# Patient Record
Sex: FEMALE | Race: WHITE | Employment: FULL TIME | URBAN - METROPOLITAN AREA
[De-identification: names, ages, dates, MRNs, and addresses within clinical notes are randomized per-mention and may not be internally consistent; named-entity substitution may affect disease eponyms.]

---

## 2017-01-03 ENCOUNTER — OFFICE VISIT (OUTPATIENT)
Dept: INTERNAL MEDICINE CLINIC | Age: 22
End: 2017-01-03

## 2017-01-03 VITALS
DIASTOLIC BLOOD PRESSURE: 68 MMHG | OXYGEN SATURATION: 97 % | SYSTOLIC BLOOD PRESSURE: 118 MMHG | WEIGHT: 150 LBS | HEART RATE: 93 BPM | HEIGHT: 64 IN | TEMPERATURE: 96.8 F | RESPIRATION RATE: 17 BRPM | BODY MASS INDEX: 25.61 KG/M2

## 2017-01-03 DIAGNOSIS — B07.0 PLANTAR WART OF BOTH FEET: Primary | ICD-10-CM

## 2017-01-03 RX ORDER — PREDNISONE 20 MG/1
20 TABLET ORAL 2 TIMES DAILY
COMMUNITY
End: 2017-07-25

## 2017-01-03 NOTE — MR AVS SNAPSHOT
Visit Information Date & Time Provider Department Dept. Phone Encounter #  
 1/3/2017  9:45 AM Eliu Gutierrez MD [facility] Internal Medicine [phone] [id number] Follow-up Instructions Return in about 2 weeks (around 1/17/2017) for wart. Upcoming Health Maintenance Date Due Hepatitis A Peds Age 1-18 (1 of 2 - Standard Series) 12/11/1998 DTaP/Tdap/Td series (1 - Tdap) 12/11/2004 HPV AGE 9Y-26Y (1 of 3 - Female 3 Dose Series) 12/11/2008 INFLUENZA AGE 9 TO ADULT 8/1/2016 Allergies as of 1/3/2017  Review Complete On: 1/3/2017 By: Radha Kirkland LPN No Known Allergies Current Immunizations  Never Reviewed Name Date  
 TB Skin Test (PPD) Intradermal 12/21/2016 Not reviewed this visit Vitals BP Pulse Temp Resp Height(growth percentile) Weight(growth percentile)  
 118/68 (78 %/ 63 %)* (BP 1 Location: Left arm, BP Patient Position: Sitting) 93 96.8 °F (36 °C) 17 5' 4\" (1.626 m) (46 %, Z= -0.11) 150 lb (68 kg) (82 %, Z= 0.91) LMP SpO2 BMI OB Status Smoking Status 12/20/2016 97% 25.75 kg/m2 (84 %, Z= 0.98) Unknown Never Smoker *BP percentiles are based on NHBPEP's 4th Report Growth percentiles are based on CDC 2-20 Years data. Vitals History BMI and BSA Data Body Mass Index Body Surface Area 25.75 kg/m 2 1.75 m 2 Preferred Pharmacy Pharmacy Name Phone 5 [address] [phone] Your Updated Medication List  
  
   
This list is accurate as of: 1/3/17 10:25 AM.  Always use your most recent med list.  
  
  
  
  
 APRISO 0.375 gram capsule Generic drug:  mesalamine ER Take 1.5 g by mouth daily. azaTHIOprine 50 mg tablet Commonly known as:  The Pepsi Take 50 mg by mouth daily. ferrous sulfate 325 mg (65 mg iron) tablet Take  by mouth Daily (before breakfast). omeprazole 40 mg capsule Commonly known as:  PRILOSEC  
 Take 40 mg by mouth daily. predniSONE 20 mg tablet Commonly known as:  Yaritza Dean Take 20 mg by mouth two (2) times a day. Follow-up Instructions Return in about 2 weeks (around 1/17/2017) for wart. Patient Instructions Plantar Warts: Care Instructions Your Care Instructions A plantar wart is a harmless skin growth. Plantar warts occur on the bottom of your feet and may be painful when you walk. A virus makes the top layer of skin grow quickly, causing a wart. Warts usually go away on their own in months or years. Warts are spread easily. You can infect yourself again by touching the wart and then touching another part of your body. You also can infect others by sharing towels, razors, or other personal items. Most plantar warts do not need treatment. But if warts cause you pain or spread, your doctor may recommend that you use an over-the-counter treatment. These include salicylic acid or duct tape. Your doctor may prescribe a stronger medicine to put on warts or may inject them with medicine. Your doctor also can remove warts through surgery or by freezing them. Follow-up care is a key part of your treatment and safety. Be sure to make and go to all appointments, and call your doctor if you are having problems. Its also a good idea to know your test results and keep a list of the medicines you take. How can you care for yourself at home? · Use salicylic acid or duct tape as your doctor directs. You put the medicine or the tape on a wart for a while and then file down the dead skin on the wart. You use the salicylic acid treatment for 2 to 3 months or the tape for 1 to 2 months. · If your doctor prescribes medicine to put on warts, use it exactly as prescribed. Call your doctor if you think you are having a problem with your medicine. · Wear comfortable shoes and socks. Avoid high heels or shoes that put a lot of pressure on your foot. · Pad the wart with doughnut-shaped felt or a moleskin patch. You can buy these at a drugstore. Put the pad around the plantar wart so that it relieves pressure on the wart. You also can place pads or cushions in your shoes to make walking more comfortable. · Take an over-the-counter medicine, such as acetaminophen (Tylenol), ibuprofen (Advil, Motrin), or naproxen (Aleve) if you have pain. Read and follow all instructions on the label. · Do not take two or more pain medicines at the same time unless the doctor told you to. Many pain medicines have acetaminophen, which is Tylenol. Too much acetaminophen (Tylenol) can be harmful. When should you call for help? Call your doctor now or seek immediate medical care if: 
· You have signs of infection, such as: 
¨ Increased pain, swelling, warmth, or redness. ¨ Red streaks leading from a wart. ¨ Pus draining from a wart. ¨ A fever. · You have diabetes or peripheral arterial disease and the skin over a plantar wart is red, broken, or swollen. These diseases can reduce blood flow and feeling in your feet. This could make it easier for you to get an infection. Watch closely for changes in your health, and be sure to contact your doctor if: 
· You cannot walk without pain. · You have a new growth and you are not sure that it is a wart. · You still have warts after 2 to 3 months of over-the-counter treatment. · Your warts are growing or spreading quickly even with treatment. Where can you learn more? Go to http://artie-livan.info/. Enter S429 in the search box to learn more about \"Plantar Warts: Care Instructions. \" Current as of: February 5, 2016 Content Version: 11.1 © 6417-3058 [employer]. Care instructions adapted under license by [employer] (which disclaims liability or warranty for this information).  If you have questions about a medical condition or this instruction, always ask your healthcare professional. [address]  any warranty or liability for your use of this information. Introducing [facility] & HEALTH SERVICES! [facility] introduces [employer] patient portal. Now you can access parts of your medical record, email your doctor's office, and request medication refills online. 1. In your internet browser, go to [email or web address] 2. Click on the First Time User? Click Here link in the Sign In box. You will see the New Member Sign Up page. 3. Enter your [employer] Access Code exactly as it appears below. You will not need to use this code after youve completed the sign-up process. If you do not sign up before the expiration date, you must request a new code. · [employer] Access Code: [email or web address] Expires: 3/19/2017  1:35 PM 
 
4. Enter the last four digits of your Social Security Number (xxxx) and Date of Birth (mm/dd/yyyy) as indicated and click Submit. You will be taken to the next sign-up page. 5. Create a [employer] ID. This will be your [employer] login ID and cannot be changed, so think of one that is secure and easy to remember. 6. Create a [employer] password. You can change your password at any time. 7. Enter your Password Reset Question and Answer. This can be used at a later time if you forget your password. 8. Enter your e-mail address. You will receive e-mail notification when new information is available in [address] 9. Click Sign Up. You can now view and download portions of your medical record. 10. Click the Download Summary menu link to download a portable copy of your medical information. If you have questions, please visit the Frequently Asked Questions section of the [employer] website. Remember, [employer] is NOT to be used for urgent needs. For medical emergencies, dial 911. Now available from your iPhone and Android! Please provide this summary of care documentation to your next provider. Your primary care clinician is listed as Eliu Acharya. If you have any questions after today's visit, please call [phone].

## 2017-01-03 NOTE — PATIENT INSTRUCTIONS
Plantar Warts: Care Instructions  Your Care Instructions  A plantar wart is a harmless skin growth. Plantar warts occur on the bottom of your feet and may be painful when you walk. A virus makes the top layer of skin grow quickly, causing a wart. Warts usually go away on their own in months or years. Warts are spread easily. You can infect yourself again by touching the wart and then touching another part of your body. You also can infect others by sharing towels, razors, or other personal items. Most plantar warts do not need treatment. But if warts cause you pain or spread, your doctor may recommend that you use an over-the-counter treatment. These include salicylic acid or duct tape. Your doctor may prescribe a stronger medicine to put on warts or may inject them with medicine. Your doctor also can remove warts through surgery or by freezing them. Follow-up care is a key part of your treatment and safety. Be sure to make and go to all appointments, and call your doctor if you are having problems. Its also a good idea to know your test results and keep a list of the medicines you take. How can you care for yourself at home? · Use salicylic acid or duct tape as your doctor directs. You put the medicine or the tape on a wart for a while and then file down the dead skin on the wart. You use the salicylic acid treatment for 2 to 3 months or the tape for 1 to 2 months. · If your doctor prescribes medicine to put on warts, use it exactly as prescribed. Call your doctor if you think you are having a problem with your medicine. · Wear comfortable shoes and socks. Avoid high heels or shoes that put a lot of pressure on your foot. · Pad the wart with doughnut-shaped felt or a moleskin patch. You can buy these at a drugstore. Put the pad around the plantar wart so that it relieves pressure on the wart. You also can place pads or cushions in your shoes to make walking more comfortable.   · Take an over-the-counter medicine, such as acetaminophen (Tylenol), ibuprofen (Advil, Motrin), or naproxen (Aleve) if you have pain. Read and follow all instructions on the label. · Do not take two or more pain medicines at the same time unless the doctor told you to. Many pain medicines have acetaminophen, which is Tylenol. Too much acetaminophen (Tylenol) can be harmful. When should you call for help? Call your doctor now or seek immediate medical care if:  · You have signs of infection, such as:  ¨ Increased pain, swelling, warmth, or redness. ¨ Red streaks leading from a wart. ¨ Pus draining from a wart. ¨ A fever. · You have diabetes or peripheral arterial disease and the skin over a plantar wart is red, broken, or swollen. These diseases can reduce blood flow and feeling in your feet. This could make it easier for you to get an infection. Watch closely for changes in your health, and be sure to contact your doctor if:  · You cannot walk without pain. · You have a new growth and you are not sure that it is a wart. · You still have warts after 2 to 3 months of over-the-counter treatment. · Your warts are growing or spreading quickly even with treatment. Where can you learn more? Go to http://artie-livan.info/. Enter S429 in the search box to learn more about \"Plantar Warts: Care Instructions. \"  Current as of: February 5, 2016  Content Version: 11.1  © 6755-8902 [email or web address]. Care instructions adapted under license by [employer] (which disclaims liability or warranty for this information). If you have questions about a medical condition or this instruction, always ask your healthcare professional. [place] [address] any warranty or liability for your use of this information.

## 2017-01-04 NOTE — PROGRESS NOTES
Subjective:     Chief Complaint   Patient presents with    Warts        She  is a 25y.o. year old female who presents today for follow up on planer wart for her both feet. She did get cryotherapy two weeks ago. Reports that the left toe wart is lot better after the treatment. Pertinent items are noted in HPI. Objective:     Vitals:    01/03/17 1002   BP: 118/68   Pulse: 93   Resp: 17   Temp: 96.8 °F (36 °C)   SpO2: 97%   Weight: 150 lb (68 kg)   Height: 5' 4\" (1.626 m)       Physical Examination: General appearance - alert, well appearing, and in no distress, oriented to person, place, and time and normal appearing weight  Mental status - alert, oriented to person, place, and time, normal mood, behavior, speech, dress, motor activity, and thought processes  Extremities - peripheral pulses normal, no pedal edema, no clubbing or cyanosis, there is a small flat wart in her sole of the right foot. Two flat wart in her tip of the 2 nd  toe of left foot. No Known Allergies   Social History     Social History    Marital status: UNKNOWN     Spouse name: N/A    Number of children: N/A    Years of education: N/A     Social History Main Topics    Smoking status: Never Smoker    Smokeless tobacco: Never Used    Alcohol use No    Drug use: No    Sexual activity: No     Other Topics Concern    None     Social History Narrative      Family History   Problem Relation Age of Onset    Thyroid Disease Mother     No Known Problems Father       History reviewed. No pertinent past surgical history. Past Medical History   Diagnosis Date    Anemia     Crohn disease ([facility] [address]       Current Outpatient Prescriptions   Medication Sig Dispense Refill    predniSONE (DELTASONE) 20 mg tablet Take 20 mg by mouth two (2) times a day.  omeprazole (PRILOSEC) 40 mg capsule Take 40 mg by mouth daily.  ferrous sulfate 325 mg (65 mg iron) tablet Take  by mouth Daily (before breakfast).       azaTHIOprine (IMURAN) 50 mg tablet Take 50 mg by mouth daily.  APRISO 0.375 gram capsule Take 1.5 g by mouth daily. Assessment/ Plan:   Carlos Be was seen today for warts. Diagnoses and all orders for this visit:    Plantar wart of both feet    - cryotherapy done. Patient tolerated well   - f/u in two weeks. Medication risks/benefits/costs/interactions/alternatives discussed with patient. Advised patient to call back or return to office if symptoms worsen/change/persist. If patient cannot reach us or should anything more severe/urgent arise he/she should proceed directly to the nearest emergency department. Discussed expected course/resolution/complications of diagnosis in detail with patient. Patient given a written after visit summary which includes her diagnoses, current medications and vitals. Patient expressed understanding with the diagnosis and plan. Follow-up Disposition:  Return in about 2 weeks (around 1/17/2017) for wart.

## 2017-07-20 ENCOUNTER — OFFICE VISIT (OUTPATIENT)
Dept: INTERNAL MEDICINE CLINIC | Age: 22
End: 2017-07-20

## 2017-07-20 VITALS
RESPIRATION RATE: 15 BRPM | SYSTOLIC BLOOD PRESSURE: 128 MMHG | OXYGEN SATURATION: 98 % | WEIGHT: 168.6 LBS | BODY MASS INDEX: 28.79 KG/M2 | HEART RATE: 71 BPM | HEIGHT: 64 IN | DIASTOLIC BLOOD PRESSURE: 67 MMHG | TEMPERATURE: 98.4 F

## 2017-07-20 DIAGNOSIS — Z01.818 PRE-OP EVALUATION: Primary | ICD-10-CM

## 2017-07-20 DIAGNOSIS — B07.9 VIRAL WARTS, UNSPECIFIED TYPE: ICD-10-CM

## 2017-07-20 LAB
BILIRUB UR QL STRIP: NEGATIVE
GLUCOSE UR-MCNC: NEGATIVE MG/DL
KETONES P FAST UR STRIP-MCNC: NEGATIVE MG/DL
PH UR STRIP: 7.5 [PH] (ref 4.6–8)
PROT UR QL STRIP: NEGATIVE MG/DL
SP GR UR STRIP: 1.01 (ref 1–1.03)
UA UROBILINOGEN AMB POC: NORMAL (ref 0.2–1)
URINALYSIS CLARITY POC: CLEAR
URINALYSIS COLOR POC: YELLOW
URINE BLOOD POC: NEGATIVE
URINE LEUKOCYTES POC: NEGATIVE
URINE NITRITES POC: NEGATIVE

## 2017-07-20 NOTE — MR AVS SNAPSHOT
Visit Information Date & Time Provider Department Dept. Phone Encounter #  
 7/20/2017  1:45 PM Eliu Walsh MD [facility] [phone] [id number] Follow-up Instructions Return if symptoms worsen or fail to improve. Upcoming Health Maintenance Date Due Hepatitis A Peds Age 1-18 (1 of 2 - Standard Series) 12/11/1998 DTaP/Tdap/Td series (1 - Tdap) 12/11/2004 HPV AGE 9Y-26Y (1 of 3 - Female 3 Dose Series) 12/11/2008 INFLUENZA AGE 9 TO ADULT 8/1/2017 Allergies as of 7/20/2017  Review Complete On: 7/20/2017 By: Augusto Hernandez MD  
 No Known Allergies Current Immunizations  Never Reviewed Name Date  
 TB Skin Test (PPD) Intradermal 12/21/2016 Not reviewed this visit You Were Diagnosed With   
  
 Codes Comments Pre-op evaluation    -  Primary ICD-10-CM: Y87.004 ICD-9-CM: V72.84 Viral warts, unspecified type     ICD-10-CM: B07.9 ICD-9-CM: 078.10 Vitals BP Pulse Temp Resp Height(growth percentile) 128/67 (97 %/ 63 %)* (BP 1 Location: Left arm, BP Patient Position: Sitting) 71 98.4 °F (36.9 °C) (Oral) 15 5' 4\" (1.626 m) (45 %, Z= -0.12) Weight(growth percentile) SpO2 BMI OB Status Smoking Status 168 lb 9.6 oz (76.5 kg) (91 %, Z= 1.36) 98% 28.94 kg/m2 (92 %, Z= 1.39) Unknown Never Smoker *BP percentiles are based on NHBPEP's 4th Report Growth percentiles are based on CDC 2-20 Years data. BMI and BSA Data Body Mass Index Body Surface Area  
 28.94 kg/m 2 1.86 m 2 Preferred Pharmacy Pharmacy Name Phone [address] [facility], [address] [place] [address] [phone] Your Updated Medication List  
  
   
This list is accurate as of: 7/20/17  2:16 PM.  Always use your most recent med list.  
  
  
  
  
 APRISO 0.375 gram capsule Generic drug:  mesalamine ER Take 1.5 g by mouth daily. azaTHIOprine 50 mg tablet Commonly known as:  The Pepsi  
 Take 50 mg by mouth daily. ferrous sulfate 325 mg (65 mg iron) tablet Take  by mouth Daily (before breakfast). HUMIRA PEN 40 mg/0.8 mL injection pen Generic drug:  adalimumab  
by SubCUTAneous route once. Every other week  
  
 omeprazole 40 mg capsule Commonly known as:  PRILOSEC Take 40 mg by mouth daily. predniSONE 20 mg tablet Commonly known as:  Edrie Power Take 20 mg by mouth two (2) times a day. We Performed the Following AMB POC URINALYSIS DIP STICK MANUAL W/O MICRO [37519 CPT(R)] CBC WITH AUTOMATED DIFF [90892 CPT(R)] METABOLIC PANEL, COMPREHENSIVE [96816 CPT(R)] Follow-up Instructions Return if symptoms worsen or fail to improve. Introducing [facility] & HEALTH SERVICES! Miesha Morin introduces [employer] patient portal. Now you can access parts of your medical record, email your doctor's office, and request medication refills online. 1. In your internet browser, go to [email or web address] 2. Click on the First Time User? Click Here link in the Sign In box. You will see the New Member Sign Up page. 3. Enter your [employer] Access Code exactly as it appears below. You will not need to use this code after youve completed the sign-up process. If you do not sign up before the expiration date, you must request a new code. · [employer] Access Code: [email or web address] Expires: 10/18/2017  2:16 PM 
 
4. Enter the last four digits of your Social Security Number (xxxx) and Date of Birth (mm/dd/yyyy) as indicated and click Submit. You will be taken to the next sign-up page. 5. Create a [employer] ID. This will be your [employer] login ID and cannot be changed, so think of one that is secure and easy to remember. 6. Create a [employer] password. You can change your password at any time. 7. Enter your Password Reset Question and Answer. This can be used at a later time if you forget your password. 8. Enter your e-mail address. You will receive e-mail notification when new information is available in [address] 9. Click Sign Up. You can now view and download portions of your medical record. 10. Click the Download Summary menu link to download a portable copy of your medical information. If you have questions, please visit the Frequently Asked Questions section of the [employer] website. Remember, [employer] is NOT to be used for urgent needs. For medical emergencies, dial 911. Now available from your iPhone and Android! Please provide this summary of care documentation to your next provider. Your primary care clinician is listed as Eliupeter Acharya. If you have any questions after today's visit, please call [phone].

## 2017-07-20 NOTE — PROGRESS NOTES
Chief Complaint   Patient presents with    Pre-op Exam     wart removal       1. Have you been to the ER, urgent care clinic since your last visit? Hospitalized since your last visit? No    2. Have you seen or consulted any other health care providers outside of the [address] [place] [address] since your last visit? Include any pap smears or colon screening. No    Body mass index is 28.94 kg/(m^2).

## 2017-07-23 NOTE — PROGRESS NOTES
Subjective:     Chief Complaint   Patient presents with    Pre-op Exam     wart removal        She  is a 25y.o. year old female with h/o crohn's d/s and chronic wart in her feet who presents today for a pre op clearance for her upcoming laser rx for wart next week. Her podiatry need UA, CBC, CMP level checked. Never had Sx in the past.    Has been regularly following with GI and takes med regularly. A comprehensive review of systems was negative except for that written in the HPI. Objective:     Vitals:    07/20/17 1357   BP: 128/67   Pulse: 71   Resp: 15   Temp: 98.4 °F (36.9 °C)   TempSrc: Oral   SpO2: 98%   Weight: 168 lb 9.6 oz (76.5 kg)   Height: 5' 4\" (1.626 m)       Physical Examination: General appearance - alert, well appearing, and in no distress, oriented to person, place, and time and overweight  Mental status - alert, oriented to person, place, and time, normal mood, behavior, speech, dress, motor activity, and thought processes  Ears - bilateral TM's and external ear canals normal  Nose - normal and patent, no erythema, discharge or polyps  Neck - supple, no significant adenopathy  Heart - normal rate, regular rhythm, normal S1, S2, no murmurs, rubs, clicks or gallops  Abdomen - soft, nontender, nondistended, no masses or organomegaly  Neurological - alert, oriented, normal speech, no focal findings or movement disorder noted  Musculoskeletal - no joint tenderness, deformity or swelling  Extremities - peripheral pulses normal, no pedal edema, no clubbing or cyanosis, multiple wart spreaded to her both bottom of the feet and toes.     No Known Allergies   Social History     Social History    Marital status: UNKNOWN     Spouse name: N/A    Number of children: N/A    Years of education: N/A     Social History Main Topics    Smoking status: Never Smoker    Smokeless tobacco: Never Used    Alcohol use No    Drug use: No    Sexual activity: No     Other Topics Concern    None     Social History Narrative      Family History   Problem Relation Age of Onset    Thyroid Disease Mother     No Known Problems Father       No past surgical history on file. Past Medical History:   Diagnosis Date    Anemia     Crohn disease ([address]       Current Outpatient Prescriptions   Medication Sig Dispense Refill    adalimumab (HUMIRA PEN) 40 mg/0.8 mL injection pen by SubCUTAneous route once. Every other week      omeprazole (PRILOSEC) 40 mg capsule Take 40 mg by mouth daily.  ferrous sulfate 325 mg (65 mg iron) tablet Take  by mouth Daily (before breakfast).  APRISO 0.375 gram capsule Take 1.5 g by mouth daily.  predniSONE (DELTASONE) 20 mg tablet Take 20 mg by mouth two (2) times a day.  azaTHIOprine (IMURAN) 50 mg tablet Take 50 mg by mouth daily. Assessment/ Plan:   Cinthia Ellis was seen today for pre-op exam.    Diagnoses and all orders for this visit:    Pre-op evaluation  -     AMB POC URINALYSIS DIP STICK MANUAL W/O MICRO  -     CBC WITH AUTOMATED DIFF  -     METABOLIC PANEL, COMPREHENSIVE    Viral warts, unspecified type  -     AMB POC URINALYSIS DIP STICK MANUAL W/O MICRO  -     CBC WITH AUTOMATED DIFF  -     METABOLIC PANEL, COMPREHENSIVE    As soon as patient does her blood work will fax the clearance to her podiatrist.       Addendum: CBC, BMP result received. Hemoglobin level is slightly low. Advised to start OTC iron supplement. According to history and physical risk of surgery is low. Medication risks/benefits/costs/interactions/alternatives discussed with patient. Advised patient to call back or return to office if symptoms worsen/change/persist. If patient cannot reach us or should anything more severe/urgent arise he/she should proceed directly to the nearest emergency department. Discussed expected course/resolution/complications of diagnosis in detail with patient.   Patient given a written after visit summary which includes her diagnoses, current medications and vitals. Patient expressed understanding with the diagnosis and plan. Follow-up Disposition:  Return if symptoms worsen or fail to improve.

## 2017-07-24 ENCOUNTER — TELEPHONE (OUTPATIENT)
Dept: INTERNAL MEDICINE CLINIC | Age: 22
End: 2017-07-24

## 2017-07-24 NOTE — TELEPHONE ENCOUNTER
Office pt is getting surgery at is stating they need a letter of medical clearance sent to them as well

## 2017-07-24 NOTE — TELEPHONE ENCOUNTER
Pt stated she went to [facility] and stated she got the rest of her labs done there, stated shell have [facility] fax over the results to us

## 2017-07-24 NOTE — TELEPHONE ENCOUNTER
I already asked Imelda Ricardo to fax my note to them this morning. Handed the paper to Imelda Ricardo this morning.

## 2017-07-25 NOTE — PERIOP NOTES
[facility]  PREOPERATIVE INSTRUCTIONS    Surgery Date:  7/28/2017     Surgery arrival time given by surgeon: NO  (If [facility] staff will call you between 4pm - 8pm the day before surgery with your arrival time. If your surgery is on a Monday, we will call you the preceding Friday. Please call [phone] after 7pm if you did not receive your arrival time.)  1. Report  to the 2nd Floor Admitting Desk at the time you were told the night before surgery. Bring your insurance card, photo identification, and any copayment (if applicable). 2. You must have a responsible adult to drive you home and stay with you the first 24 hours after surgery if you are going home the same day of your surgery. 3. Nothing to eat or drink after midnight the night before surgery. This means NO water, gum, mints, coffee, juice, etc.    4. MEDICATIONS TO TAKE THE MORNING OF SURGERY WITH A SIP OF WATER:Prilosec  5. No alcoholic beverages 24 hours before and after your surgery. 6. If you are being admitted to the hospital,please leave personal belongings/luggage in your car until you have an assigned hospital room number. ( The hospital discharge time is 12 PM NOON. Your adult  should be at the hospital prior to the noon discharge time unless otherwise instructed.)   7. STOP Aspirin and/or any non-steroidal anti-inflammatory drugs (i.e. Ibuprofen, Naproxen, Advil, Aleve) as directed by your surgeon. You may take Tylenol. Stop herbal supplements 1 week prior to  surgery. 8. If you are currently taking Plavix, Coumadin,or any other blood-thinning/ anticoagulant medication contact your surgeon for instructions. 9. Wear comfortable clothes. Wear your glasses instead of contacts. Please leave all money, jewelry and valuables at home. No make up, particularly mascara, the day of surgery. 10.  REMOVE ALL body piercings, rings,and jewelry and leave at home. Wear your hair loose or down, no pony-tails, buns, or any metal hair clips.    11. If you shower the morning of surgery, please do not apply any lotions, powders, or deodorants afterwards. Do not shave any body area within 24 hours of your surgery. 12. Please follow all instructions to avoid any potential surgical cancellation. 13. Should your physical condition change, (i.e. fever, cold, flu, etc.) please notify your surgeon as soon as possible. 14. It is important to be on time. If a situation occurs where you may be delayed, please call:  [phone] / [phone] on the day of surgery. 15. The Preadmission Testing staff can be reached at [phone]. 16. Special instructions: None  17. The patient was contacted  via phone. She  verbalize  understanding of all instructions does not  need reinforcement.

## 2017-07-25 NOTE — TELEPHONE ENCOUNTER
Informed pt;s mother that pt's hemoglobin level is low. Per , she needs to take iron otc BID and follow up in 2 months. Pt's mother verbalized her understanding. Clearance letter and labs faxed.

## 2017-07-27 ENCOUNTER — ANESTHESIA EVENT (OUTPATIENT)
Dept: SURGERY | Age: 22
End: 2017-07-27
Payer: COMMERCIAL

## 2017-07-27 NOTE — PERIOP NOTES
Preoperative H&P done on 7/20/2017 by Dr. Kevin Claire in [address] [facility] does NOT have a lung assessment. Also the labs done at [facility] and scanned into the system only have part of the CMP results. The 2nd page has not been scanned. Spoke to [facility] at Dr. Harpal Phan office inquiring if they have received a paper H&P that includes the lung assessment and a full copy of the lab work. Also requested orders for surgery. Kori to fax documents to PAT.   DOS: 7/28/2017

## 2017-07-27 NOTE — PERIOP NOTES
Received documents from Dr. Dez Clancy office, but they are a print out of exactly what is in the system already. Placed on paper chart.   DOS: 7/28/2017

## 2017-07-28 ENCOUNTER — HOSPITAL ENCOUNTER (OUTPATIENT)
Age: 22
Setting detail: OUTPATIENT SURGERY
Discharge: HOME | End: 2017-07-28
Attending: PODIATRIST | Admitting: PODIATRIST
Payer: COMMERCIAL

## 2017-07-28 ENCOUNTER — ANESTHESIA (OUTPATIENT)
Dept: SURGERY | Age: 22
End: 2017-07-28
Payer: COMMERCIAL

## 2017-07-28 VITALS
DIASTOLIC BLOOD PRESSURE: 58 MMHG | RESPIRATION RATE: 17 BRPM | BODY MASS INDEX: 27.18 KG/M2 | TEMPERATURE: 97.5 F | HEIGHT: 65 IN | HEART RATE: 62 BPM | SYSTOLIC BLOOD PRESSURE: 108 MMHG | WEIGHT: 163.14 LBS | OXYGEN SATURATION: 100 %

## 2017-07-28 LAB — HCG UR QL: NEGATIVE

## 2017-07-28 PROCEDURE — 77030002986 HC SUT PROL J&J -A: Performed by: PODIATRIST

## 2017-07-28 PROCEDURE — 74011250636 HC RX REV CODE- 250/636: Performed by: ANESTHESIOLOGY

## 2017-07-28 PROCEDURE — 77030018836 HC SOL IRR NACL ICUM -A: Performed by: PODIATRIST

## 2017-07-28 PROCEDURE — 77030020782 HC GWN BAIR PAWS FLX 3M -B

## 2017-07-28 PROCEDURE — 77030036687 HC SHOE PSTOP S2SG -A

## 2017-07-28 PROCEDURE — 74011250636 HC RX REV CODE- 250/636

## 2017-07-28 PROCEDURE — 88305 TISSUE EXAM BY PATHOLOGIST: CPT | Performed by: PODIATRIST

## 2017-07-28 PROCEDURE — 76060000034 HC ANESTHESIA 1.5 TO 2 HR: Performed by: PODIATRIST

## 2017-07-28 PROCEDURE — 97161 PT EVAL LOW COMPLEX 20 MIN: CPT

## 2017-07-28 PROCEDURE — 77030028224 HC PDNG CST BSNM -A: Performed by: PODIATRIST

## 2017-07-28 PROCEDURE — 76010000153 HC OR TIME 1.5 TO 2 HR: Performed by: PODIATRIST

## 2017-07-28 PROCEDURE — 76210000026 HC REC RM PH II 1 TO 1.5 HR: Performed by: PODIATRIST

## 2017-07-28 PROCEDURE — 74011250636 HC RX REV CODE- 250/636: Performed by: PODIATRIST

## 2017-07-28 PROCEDURE — 77030011640 HC PAD GRND REM COVD -A: Performed by: PODIATRIST

## 2017-07-28 PROCEDURE — 77030002916 HC SUT ETHLN J&J -A: Performed by: PODIATRIST

## 2017-07-28 PROCEDURE — 81025 URINE PREGNANCY TEST: CPT

## 2017-07-28 PROCEDURE — 77030027688 HC DRSG MEPILEX 16-48IN NO BORD MOLN -A: Performed by: PODIATRIST

## 2017-07-28 PROCEDURE — 77030000032 HC CUF TRNQT ZIMM -B: Performed by: PODIATRIST

## 2017-07-28 PROCEDURE — 74011000250 HC RX REV CODE- 250: Performed by: PODIATRIST

## 2017-07-28 PROCEDURE — 77030031139 HC SUT VCRL2 J&J -A: Performed by: PODIATRIST

## 2017-07-28 RX ORDER — CEFAZOLIN SODIUM IN 0.9 % NACL 2 G/50 ML
2 INTRAVENOUS SOLUTION, PIGGYBACK (ML) INTRAVENOUS ONCE
Status: COMPLETED | OUTPATIENT
Start: 2017-07-28 | End: 2017-07-28

## 2017-07-28 RX ORDER — SODIUM CHLORIDE 0.9 % (FLUSH) 0.9 %
5-10 SYRINGE (ML) INJECTION EVERY 8 HOURS
Status: DISCONTINUED | OUTPATIENT
Start: 2017-07-28 | End: 2017-07-28 | Stop reason: HOSPADM

## 2017-07-28 RX ORDER — PROPOFOL 10 MG/ML
INJECTION, EMULSION INTRAVENOUS AS NEEDED
Status: DISCONTINUED | OUTPATIENT
Start: 2017-07-28 | End: 2017-07-28 | Stop reason: HOSPADM

## 2017-07-28 RX ORDER — SODIUM CHLORIDE 0.9 % (FLUSH) 0.9 %
5-10 SYRINGE (ML) INJECTION AS NEEDED
Status: DISCONTINUED | OUTPATIENT
Start: 2017-07-28 | End: 2017-07-28 | Stop reason: HOSPADM

## 2017-07-28 RX ORDER — ONDANSETRON 2 MG/ML
INJECTION INTRAMUSCULAR; INTRAVENOUS AS NEEDED
Status: DISCONTINUED | OUTPATIENT
Start: 2017-07-28 | End: 2017-07-28 | Stop reason: HOSPADM

## 2017-07-28 RX ORDER — DIPHENHYDRAMINE HYDROCHLORIDE 50 MG/ML
12.5 INJECTION, SOLUTION INTRAMUSCULAR; INTRAVENOUS AS NEEDED
Status: DISCONTINUED | OUTPATIENT
Start: 2017-07-28 | End: 2017-07-28 | Stop reason: HOSPADM

## 2017-07-28 RX ORDER — HYDROMORPHONE HYDROCHLORIDE 1 MG/ML
.25-1 INJECTION, SOLUTION INTRAMUSCULAR; INTRAVENOUS; SUBCUTANEOUS
Status: DISCONTINUED | OUTPATIENT
Start: 2017-07-28 | End: 2017-07-28 | Stop reason: HOSPADM

## 2017-07-28 RX ORDER — LIDOCAINE HYDROCHLORIDE 10 MG/ML
0.1 INJECTION, SOLUTION EPIDURAL; INFILTRATION; INTRACAUDAL; PERINEURAL AS NEEDED
Status: DISCONTINUED | OUTPATIENT
Start: 2017-07-28 | End: 2017-07-28 | Stop reason: HOSPADM

## 2017-07-28 RX ORDER — ONDANSETRON 2 MG/ML
4 INJECTION INTRAMUSCULAR; INTRAVENOUS AS NEEDED
Status: DISCONTINUED | OUTPATIENT
Start: 2017-07-28 | End: 2017-07-28 | Stop reason: HOSPADM

## 2017-07-28 RX ORDER — SODIUM CHLORIDE, SODIUM LACTATE, POTASSIUM CHLORIDE, CALCIUM CHLORIDE 600; 310; 30; 20 MG/100ML; MG/100ML; MG/100ML; MG/100ML
125 INJECTION, SOLUTION INTRAVENOUS CONTINUOUS
Status: DISCONTINUED | OUTPATIENT
Start: 2017-07-28 | End: 2017-07-28 | Stop reason: HOSPADM

## 2017-07-28 RX ORDER — MIDAZOLAM HYDROCHLORIDE 1 MG/ML
INJECTION, SOLUTION INTRAMUSCULAR; INTRAVENOUS AS NEEDED
Status: DISCONTINUED | OUTPATIENT
Start: 2017-07-28 | End: 2017-07-28 | Stop reason: HOSPADM

## 2017-07-28 RX ORDER — SODIUM CHLORIDE, SODIUM LACTATE, POTASSIUM CHLORIDE, CALCIUM CHLORIDE 600; 310; 30; 20 MG/100ML; MG/100ML; MG/100ML; MG/100ML
100 INJECTION, SOLUTION INTRAVENOUS CONTINUOUS
Status: DISCONTINUED | OUTPATIENT
Start: 2017-07-28 | End: 2017-07-28 | Stop reason: HOSPADM

## 2017-07-28 RX ORDER — PROPOFOL 10 MG/ML
INJECTION, EMULSION INTRAVENOUS
Status: DISCONTINUED | OUTPATIENT
Start: 2017-07-28 | End: 2017-07-28 | Stop reason: HOSPADM

## 2017-07-28 RX ORDER — DEXAMETHASONE SODIUM PHOSPHATE 100 MG/10ML
INJECTION INTRAMUSCULAR; INTRAVENOUS AS NEEDED
Status: DISCONTINUED | OUTPATIENT
Start: 2017-07-28 | End: 2017-07-28 | Stop reason: HOSPADM

## 2017-07-28 RX ORDER — FENTANYL CITRATE 50 UG/ML
INJECTION, SOLUTION INTRAMUSCULAR; INTRAVENOUS AS NEEDED
Status: DISCONTINUED | OUTPATIENT
Start: 2017-07-28 | End: 2017-07-28 | Stop reason: HOSPADM

## 2017-07-28 RX ADMIN — FENTANYL CITRATE 50 MCG: 50 INJECTION, SOLUTION INTRAMUSCULAR; INTRAVENOUS at 07:27

## 2017-07-28 RX ADMIN — DEXAMETHASONE SODIUM PHOSPHATE 4 MG: 100 INJECTION INTRAMUSCULAR; INTRAVENOUS at 07:36

## 2017-07-28 RX ADMIN — PROPOFOL 75 MCG/KG/MIN: 10 INJECTION, EMULSION INTRAVENOUS at 07:37

## 2017-07-28 RX ADMIN — SODIUM CHLORIDE, SODIUM LACTATE, POTASSIUM CHLORIDE, AND CALCIUM CHLORIDE 100 ML/HR: 600; 310; 30; 20 INJECTION, SOLUTION INTRAVENOUS at 06:53

## 2017-07-28 RX ADMIN — SODIUM CHLORIDE, SODIUM LACTATE, POTASSIUM CHLORIDE, AND CALCIUM CHLORIDE: 600; 310; 30; 20 INJECTION, SOLUTION INTRAVENOUS at 09:00

## 2017-07-28 RX ADMIN — ONDANSETRON 4 MG: 2 INJECTION INTRAMUSCULAR; INTRAVENOUS at 08:30

## 2017-07-28 RX ADMIN — CEFAZOLIN 2 G: 1 INJECTION, POWDER, FOR SOLUTION INTRAMUSCULAR; INTRAVENOUS; PARENTERAL at 07:32

## 2017-07-28 RX ADMIN — MIDAZOLAM HYDROCHLORIDE 2 MG: 1 INJECTION, SOLUTION INTRAMUSCULAR; INTRAVENOUS at 07:27

## 2017-07-28 RX ADMIN — PROPOFOL 30 MG: 10 INJECTION, EMULSION INTRAVENOUS at 07:35

## 2017-07-28 NOTE — PROGRESS NOTES
physical Therapy EVALUATION  (Ambulatory surgery, emergency room & recovery room patients)    Patient: Homero Frederick (46 y.o. female)  Date: 7/28/2017  Primary Diagnosis and Medical History: BILATERAL PLANTAR VERRUCAS  Procedure(s) (LRB):  BILATERAL PLANTAR MULTIPLE VERRUCA EXCISION  (Bilateral) Day of Surgery   Past Medical History:   Diagnosis Date    Anemia     Crohn disease ([facility] [address]    History reviewed. No pertinent surgical history.   Patient Active Problem List   Diagnosis Code    Crohn disease ([facility] [address] K50.90    Plantar wart of right foot B07.0     Prior Level of Function/Home Situation: see above  Personal factors and/or comorbidities impacting plan of care:     Home Situation  Home Environment: Private residence  # Steps to Enter: 4  Rails to Enter: Yes  Hand Rails : Bilateral  Wheelchair Ramp: No  One/Two Story Residence: One story  Living Alone: No  Support Systems: Family member(s)  Patient Expects to be Discharged to[de-identified] Private residence  Current DME Used/Available at Home: None  Ordered Weight Bearing Status:  bilateral heel  Equipment: walker    EXAMINATION/PRESENTATION/DECISION MAKING:   Critical Behavior:     Orientation Level: Oriented to place, Oriented to person, Oriented to situation        Transfers:  Overall level of assistance required following instruction: supervision/set-up given tactile using RW  Ambulation:  Weight bearing status during ambulation: Heel  Heel  Distance (ft): 50 Feet (ft)  Assistive Device: Walker, rolling, Gait belt  Ambulation - Level of Assistance: Stand-by asssistance     Stair Management:           Strength/ROM Limitations:  WFL  Special Tests:          Physical Therapy Evaluation Charge Determination   History Examination Presentation Decision-Making   MEDIUM  Complexity : 1-2 comorbidities / personal factors will impact the outcome/ POC  LOW Complexity : 1-2 Standardized tests and measures addressing body structure, function, activity limitation and / or participation in recreation  LOW Complexity : Stable, uncomplicated  Other outcome measures l  LOW       Based on the above components, the patient evaluation is determined to be of the following complexity level: LOW     Pain:  Pain Scale 1: Numeric (0 - 10)  Pain Intensity 1: 0  Pain Location 1: Foot    Education:  Role of P.T. explained to the patient:  [x]  Yes              []   No       Topics addressed: Comments:   [x]                                    Device use and technique Bilateral LE heel weight bearing, currently with post-op shoes, and weight bearing restrictions x1 week. Patient with increased stability with RW vs axillary crutches. [x]                                    Transfer technique SPT with RW, able to maintain weight bearing restricitons   [x]                                    Gait training Occasional verbal cuing due to increased forefoot weight bearing. Patient instructed in step-to sequence to ensure compliance, improved carryover with step-to technique. No loss of balance or instability. []                                    Stair training        Patient is discharged from physical therapy at this time.     Gemini Sullivan, PT, DPT   Time Calculation: 14 mins

## 2017-07-28 NOTE — H&P
H&P Update:  Lainey Dumont was seen and examined. Reports bilateral painful plantar warts. Patient is NPO since midnight. No changed since H&P. Consent signed and in chart. All risks, complications and benefits explained. No guarantees made to the outcome of the procedure. To OR for excision of right and left foot plantar warts.     Signed By: Ankita Don DPM     July 28, 2017 7:32 AM

## 2017-07-28 NOTE — BRIEF OP NOTE
BRIEF OPERATIVE NOTE    Date of Procedure: 7/28/2017   Preoperative Diagnosis: BILATERAL PLANTAR VERRUCAS  Postoperative Diagnosis: BILATERAL PLANTAR VERRUCAS    Procedure(s):  BILATERAL PLANTAR MULTIPLE VERRUCA EXCISION   Surgeon(s) and Role:     * Mac Cat DPM - Primary         Assistant Staff:       Surgical Staff:  Circ-1: Prem Jiang RN  Scrub Tech-1: David Montgomery.  Evelina  Surg Asst-1: Arvin Mitchell RN  Event Time In   Incision Start 0803   Incision Close 0913     Anesthesia: MAC   Estimated Blood Loss: Minimal  Specimens:   ID Type Source Tests Collected by Time Destination   1 : plantar warts left foot Preservative Foot, left  Mac Cat DPM 7/28/2017 0820 Pathology   2 : plantar warts right foot Preservative Foot, Right  Mac Cat DPM 7/28/2017 0848 Pathology      Findings: Verruca  Complications: None  Implants: * No implants in log *

## 2017-07-28 NOTE — ANESTHESIA POSTPROCEDURE EVALUATION
Post-Anesthesia Evaluation and Assessment    Patient: Reyna Smith MRN: [id number]  SSN: [id number]    YOB: 1995  Age: 25 y.o. Sex: female       Cardiovascular Function/Vital Signs  Visit Vitals    /58    Pulse 62    Temp 36.4 °C (97.5 °F)    Resp 17    Ht 5' 5\" (1.651 m)    Wt 74 kg (163 lb 2.3 oz)    SpO2 100%    BMI 27.15 kg/m2       Patient is status post MAC anesthesia for Procedure(s):  BILATERAL PLANTAR MULTIPLE VERRUCA EXCISION . Nausea/Vomiting: None    Postoperative hydration reviewed and adequate. Pain:  Pain Scale 1: Numeric (0 - 10) (07/28/17 1014)  Pain Intensity 1: 0 (07/28/17 1014)   Managed    Neurological Status:   Neuro (WDL): Exceptions to WDL (07/28/17 1014)  Neuro  Orientation Level: Oriented to place;Oriented to person;Oriented to situation (07/28/17 1014)  Speech: Clear (07/28/17 1014)  LUE Motor Response: Purposeful;Spontaneous  (07/28/17 1014)  LLE Motor Response: Purposeful;Spontaneous ;Numbness (numbness to toes) (07/28/17 1014)  RUE Motor Response: Purposeful;Spontaneous  (07/28/17 1014)  RLE Motor Response: Purposeful;Spontaneous ;Numbness (numbness to toes ) (07/28/17 1014)   At baseline    Mental Status and Level of Consciousness: Arousable    Pulmonary Status:   O2 Device: Room air (07/28/17 1014)   Adequate oxygenation and airway patent    Complications related to anesthesia: None    Post-anesthesia assessment completed.  No concerns    Signed By: Reina Bhardwaj DO     July 28, 2017

## 2017-07-28 NOTE — DISCHARGE INSTRUCTIONS
Please keep left and right foot dressings clean , dry and intact. Partial weight bearing on heel only both feet. Please use walker. Take medications only as prescribed. Keep right and left foot elevated as much as possible. Follow up with Dr. Shola Virgen in 1 week. [facility] [id number]  [id number]  OFFICE [phone]  [place]    [phone]    You have just had surgery on your foot and/or ankle. Proper care during the post-operative period is an integral part of your surgical treatment program.  It is imperative that these instructions are followed to insure proper healing and to obtain the best results. 1. GO directly home and keep your feet elevated on the way. 2. DRESSING OR CAST - Keep your dressing or cast clean and dry. DO NOT remove the bandage or inspect the wound. A small amount of blood on the bandage is normal.  If the dressing falls off or gets wet, call the office immediately. 3. ELEVATION - Place two pillows under the knee and leg. Make sure to support underneath your knees. You should elevate your leg whenever you are sitting or lying down. This includes mealtime and when retiring for the night. 4. ICE - Apply 1 or 2 small bags of ice close to, BUT NOT DIRECTLY OVER, the surgical site. The ice should be ON FOR TWENTY MINUTES, THEN OFF FOR ONE HALF HOUR. Continue this sequence throughout the day. Remember to keep the dressing or cast dry. Double-bagging the ice will help. 5. Limited pain and swelling is expected. 6. Exercise your legs frequently by bending your knees to stimulate circulation and speed healing. 7. You are to be:  · PARTIAL WEIGHT BEARING ON HEELS - allowed to walk or stand with the aid of crutches or a walker to tolerance. 8. If you have a surgical shoe - it should be worn whenever you are standing or walking.   9. MEALS - Your first meal at home should be a light one such as toast or soup.  If nausea and vomiting develop call the office immediately. Drink plenty of fluid and resume a well balanced diet. 10. Sponge baths are recommended until your first post-operative appointment. 11. PRESCRIPTIONS - Please fill and take as directed. If you have any difficulties or experience any side effects after taking this medication please discontinue and call the office and/or go to your closest Emergency Room immediately. Call our office to make your next appointment; Also, if you have any of the following:  · Temperature above 100.5 degrees  · Your bandage becomes overly stained, falls off or gets wet  · You bump or injure the surgical site  · Your medication does not stop discomfort    WE WANT YOUR SURGERY AND RECOVERY TO BE SUCCESSFUL AND AS COMFORTABLE AS POSSIBLE. THANK YOU FOR FOLLOWING THESE INSTRUCTIONS. IF YOU HAVE ANY PROBLEMS OR QUESTIONS PLEASE CALL OUR OFFICE. DISCHARGE SUMMARY from your Nurse    The following personal items collected during your admission are returned to you:   Dental Appliance: Dental Appliances: None  Vision: Visual Aid: None  Hearing Aid:    Jewelry: Jewelry: None  Clothing: Clothing:  (patient's street clohes to preop locker)  Other Valuables: Other Valuables: None  Valuables sent to safe:      PATIENT INSTRUCTIONS:    After general anesthesia or intravenous sedation, for 24 hours or while taking prescription Narcotics:  · Limit your activities  · Do not drive and operate hazardous machinery  · Do not make important personal or business decisions  · Do  not drink alcoholic beverages  · If you have not urinated within 8 hours after discharge, please contact your surgeon on call.     Report the following to your surgeon:  · Excessive pain, swelling, redness or odor of or around the surgical area  · Temperature over 100.5  · Nausea and vomiting lasting longer than 4 hours or if unable to take medications  · Any signs of decreased circulation or nerve impairment to extremity: change in color, persistent  numbness, tingling, coldness or increase pain  · Any questions    COUGH AND DEEP BREATHE    Breathing deep and coughing are very important exercises to do after surgery. Deep breathing and coughing open the little air tubes and air sacks in your lungs. You take deep breaths every day. You may not even notice - it is just something you do when you sigh or yawn. It is a natural exercise you do to keep these air passages open. After surgery, take deep breaths and cough, on purpose. Coughing and deep breathing help prevent bronchitis and pneumonia after surgery. If you had chest or belly surgery, use a pillow as a \"hug jeremias\" and hold it tightly to your chest or belly when you cough. DIRECTIONS:  10. Take 10 to 15 slow deep breaths every hour while awake. 11. Breathe in deeply, and hold it for 2 seconds. 12. Exhale slowly through puckered lips, like blowing up a balloon. 13. After every 4th or 5th deep breath, hug your pillow to your chest or belly and give a hard, deep cough. Yes, it will probably hurt. But doing this exercise is very important part of healing after surgery. Take your pain medicine to help you do this exercise without too much pain. IF YOU HAVE BEEN DIAGNOSED WITH SLEEP APNEA, PLEASE USE YOUR SLEEIFP APNEA DEVICE OR CPAP MACHINE WHEN YOU INTEND TO NAP AFTER TAKING PAIN MEDICATION. Ankle Pumps    Ankle pumps increase the circulation of oxygenated blood to your lower extremities and decrease your risk for circulation problems such as blood clots. They also stretch the muscles, tendons and ligaments in your foot and ankle, and prevent joint contracture in the ankle and foot, especially after surgeries on the legs. It is important to do ankle pump exercises regularly after surgery because immobility increases your risk for developing a blood clot.   Your doctor may also have you take an Aspirin for the next few days as well.    If your doctor did not ask you to take an Aspirin, consult with him before starting Aspirin therapy on your own. Slowly point your foot forward, feeling the muscles on the top of your lower leg stretch, and hold this position for 5 seconds. Next, pull your foot back toward you as far as possible, stretching the calf muscles, and hold that position for 5 seconds. Repeat with the other foot. Perform 10 repetitions every hour while awake for both ankles if possible (down and then up with the foot once is one repetition). You should feel gentle stretching of the muscles in your lower leg when doing this exercise. If you feel pain, or your range of motion is limited, don't  Push too hard. Only go the limit your joint and muscles will let you go. If you have increasing pain, progressively worsening leg warmth or swelling, STOP the exercise and call your doctor. Below is information about the medications your doctor is prescribing after your visit:    Other information in your discharge envelope:  []     PRESCRIPTIONS  []     PHYSICAL THERAPY PRESCRIPTION  []     APPOINTMENT CARDS  []     Regional Anesthesia Pamphlet for block or block with On-Q Catheter from Anesthesia Service  []     Medical device information sheets/pamphlets from their    []     School/work excuse note. []     /parent work excuse note. These are general instructions for a healthy lifestyle:    *  Please give a list of your current medications to your Primary Care Provider. *  Please update this list whenever your medications are discontinued, doses are      changed, or new medications (including over-the-counter products) are added. *  Please carry medication information at all times in case of emergency situations.     About Smoking  No smoking / No tobacco products / Avoid exposure to second hand smoke    Surgeon General's Warning:  Quitting smoking now greatly reduces serious risk to your health. Obesity, smoking, and sedentary lifestyle greatly increases your risk for illness and disease. A healthy diet, regular physical exercise & weight monitoring are important for maintaining a healthy lifestyle. Congestive Heart Failure  You may be retaining fluid if you have a history of heart failure or if you experience any of the following symptoms:  Weight gain of 3 pounds or more overnight or 5 pounds in a week, increased swelling in our hands or feet or shortness of breath while lying flat in bed. Please call your doctor as soon as you notice any of these symptoms; do not wait until your next office visit. Recognize signs and symptoms of STROKE:  F - face looks uneven  A - arms unable to move or move even  S - speech slurred or non-existent  T - time-call 911 as soon as signs and symptoms begin-DO NOT go         Back to bed or wait to see if you get better-TIME IS BRAIN. Warning signs of HEART ATTACK  Call 911 if you have these symptoms    · Chest discomfort. Most heart attacks involve discomfort in the center of the chest that lasts more than a few minutes, or that goes away and comes back. It can feel like uncomfortable pressure, squeezing, fullness, or pain. · Discomfort in other areas of the upper body. Symptoms can include pain or discomfort in one or both        Arms, the back, neck, jaw, or stomach. ·  Shortness of breath with or without chest discomfort. · Other signs may include breaking out in a cold sweat, nausea, or lightheadedness    Don't wait more than five minutes to call 911 - MINUTES MATTER! Fast action can save your life. Calling 911 is almost always the fastest way to get lifesaving treatment. Emergency Medical Services staff can begin treatment when they arrive - up to an hour sooner than if someone gets to the hospital by car.       [facility] MEDICATION AND SIDE EFFECT GUIDE    The [facility] MEDICATION AND SIDE EFFECT GUIDE was provided to the PATIENT AND CARE PROVIDER.   Information provided includes instruction about drug purpose and common side effects for the following medications:    · Percocet  · Ibuprofen

## 2017-07-28 NOTE — IP AVS SNAPSHOT
Garcia Elders 
 
 
 [address] [facility] [address] [place] [address] 
[phone] Patient: Tonny Olvera MRN: [id number] :1995 You are allergic to the following No active allergies Recent Documentation Height Weight BMI OB Status Smoking Status 1.651 m (61 %, Z= 0.28)* 74 kg (89 %, Z= 1.23)* 27.15 kg/m2 (88 %, Z= 1.16)* Unknown Never Smoker *Growth percentiles are based on CDC 2-20 Years data. Emergency Contacts Name Discharge Info Relation Home Work Mobile Tameka Lowery DISCHARGE CAREGIVER [3] Mother [14]   [phone] About your hospitalization You were admitted on:  2017 You last received care in the:  [facility] PACU You were discharged on:  2017 Unit phone number:  [phone] Why you were hospitalized Your primary diagnosis was:  Not on File Providers Seen During Your Hospitalizations Provider Role Specialty Primary office phone Floresita Escalante DPM Attending Provider Podiatry [phone] Your Primary Care Physician (PCP) Primary Care Physician Office Phone Office Fax [address] [phone] Follow-up Information Follow up With Details Comments Contact Info Celestine Ramirez MD   [address] [place] [address] 
[phone] Yung Hillman DPM In 1 week  [address] 
[phone] Current Discharge Medication List  
  
ASK your doctor about these medications Dose & Instructions Dispensing Information Comments Morning Noon Evening Bedtime APRISO 0.375 gram capsule Generic drug:  mesalamine ER Your last dose was: Your next dose is:    
   
   
 Dose:  1.5 g Take 1.5 g by mouth daily. Refills:  0  
     
   
   
   
  
 ferrous sulfate 325 mg (65 mg iron) tablet Your last dose was: Your next dose is: Take  by mouth Daily (before breakfast). Refills:  0  
     
   
   
   
  
 HUMIRA PEN 40 mg/0.8 mL injection pen Generic drug:  adalimumab Your last dose was: Your next dose is:    
   
   
 by SubCUTAneous route once. Every other week Refills:  0  
     
   
   
   
  
 omeprazole 40 mg capsule Commonly known as:  PRILOSEC Your last dose was: Your next dose is:    
   
   
 Dose:  40 mg Take 40 mg by mouth daily. Refills:  0 Discharge Instructions Please keep left and right foot dressings clean , dry and intact. Partial weight bearing on heel only both feet. Please use walker. Take medications only as prescribed. Keep right and left foot elevated as much as possible. Follow up with Dr. Jessica Gonzalez in 1 week. [facility] Podiatric Medicine - Foot Surgery [address] [place] [address]. [address] OFFICE [phone] CELL    [phone] You have just had surgery on your foot and/or ankle. Proper care during the post-operative period is an integral part of your surgical treatment program.  It is imperative that these instructions are followed to insure proper healing and to obtain the best results. 1. GO directly home and keep your feet elevated on the way. 2. DRESSING OR CAST - Keep your dressing or cast clean and dry. DO NOT remove the bandage or inspect the wound. A small amount of blood on the bandage is normal.  If the dressing falls off or gets wet, call the office immediately. 3. ELEVATION - Place two pillows under the knee and leg. Make sure to support underneath your knees. You should elevate your leg whenever you are sitting or lying down. This includes mealtime and when retiring for the night. 4. ICE - Apply 1 or 2 small bags of ice close to, BUT NOT DIRECTLY OVER, the surgical site.   The ice should be ON FOR TWENTY MINUTES, THEN OFF FOR ONE HALF HOUR. Continue this sequence throughout the day. Remember to keep the dressing or cast dry. Double-bagging the ice will help. 5. Limited pain and swelling is expected. 6. Exercise your legs frequently by bending your knees to stimulate circulation and speed healing. 7. You are to be: 
· PARTIAL WEIGHT BEARING ON HEELS - allowed to walk or stand with the aid of crutches or a walker to tolerance. 8. If you have a surgical shoe - it should be worn whenever you are standing or walking. 9. MEALS - Your first meal at home should be a light one such as toast or soup. If nausea and vomiting develop call the office immediately. Drink plenty of fluid and resume a well balanced diet. 10. Sponge baths are recommended until your first post-operative appointment. 11. PRESCRIPTIONS - Please fill and take as directed. If you have any difficulties or experience any side effects after taking this medication please discontinue and call the office and/or go to your closest Emergency Room immediately. Call our office to make your next appointment; Also, if you have any of the following: · Temperature above 100.5 degrees · Your bandage becomes overly stained, falls off or gets wet · You bump or injure the surgical site · Your medication does not stop discomfort WE WANT YOUR SURGERY AND RECOVERY TO BE SUCCESSFUL AND AS COMFORTABLE AS POSSIBLE. THANK YOU FOR FOLLOWING THESE INSTRUCTIONS. IF YOU HAVE ANY PROBLEMS OR QUESTIONS PLEASE CALL OUR OFFICE. DISCHARGE SUMMARY from your Nurse The following personal items collected during your admission are returned to you:  
Dental Appliance: Dental Appliances: None Vision: Visual Aid: None Hearing Aid:   
Jewelry: Jewelry: None Clothing: Clothing:  (patient's street clohes to preop locker) Other Valuables: Other Valuables: None Valuables sent to safe:   
 
PATIENT INSTRUCTIONS: 
 
After general anesthesia or intravenous sedation, for 24 hours or while taking prescription Narcotics: · Limit your activities · Do not drive and operate hazardous machinery · Do not make important personal or business decisions · Do  not drink alcoholic beverages · If you have not urinated within 8 hours after discharge, please contact your surgeon on call. Report the following to your surgeon: 
· Excessive pain, swelling, redness or odor of or around the surgical area · Temperature over 100.5 · Nausea and vomiting lasting longer than 4 hours or if unable to take medications · Any signs of decreased circulation or nerve impairment to extremity: change in color, persistent  numbness, tingling, coldness or increase pain · Any questions [address] Breathing deep and coughing are very important exercises to do after surgery. Deep breathing and coughing open the little air tubes and air sacks in your lungs. You take deep breaths every day. You may not even notice - it is just something you do when you sigh or yawn. It is a natural exercise you do to keep these air passages open. After surgery, take deep breaths and cough, on purpose. Coughing and deep breathing help prevent bronchitis and pneumonia after surgery. If you had chest or belly surgery, use a pillow as a \"hug buddy\" and hold it tightly to your chest or belly when you cough. DIRECTIONS: 
10. Take 10 to 15 slow deep breaths every hour while awake. 11. Breathe in deeply, and hold it for 2 seconds. 12. Exhale slowly through puckered lips, like blowing up a balloon. 13. After every 4th or 5th deep breath, hug your pillow to your chest or belly and give a hard, deep cough. Yes, it will probably hurt. But doing this exercise is very important part of healing after surgery. Take your pain medicine to help you do this exercise without too much pain.  
 
IF YOU HAVE BEEN DIAGNOSED WITH SLEEP APNEA, PLEASE USE YOUR SLEEIFP APNEA DEVICE OR CPAP MACHINE WHEN YOU INTEND TO NAP AFTER TAKING PAIN MEDICATION. Ankle Pumps Ankle pumps increase the circulation of oxygenated blood to your lower extremities and decrease your risk for circulation problems such as blood clots. They also stretch the muscles, tendons and ligaments in your foot and ankle, and prevent joint contracture in the ankle and foot, especially after surgeries on the legs. It is important to do ankle pump exercises regularly after surgery because immobility increases your risk for developing a blood clot. Your doctor may also have you take an Aspirin for the next few days as well. If your doctor did not ask you to take an Aspirin, consult with him before starting Aspirin therapy on your own. Slowly point your foot forward, feeling the muscles on the top of your lower leg stretch, and hold this position for 5 seconds. Next, pull your foot back toward you as far as possible, stretching the calf muscles, and hold that position for 5 seconds. Repeat with the other foot. Perform 10 repetitions every hour while awake for both ankles if possible (down and then up with the foot once is one repetition). You should feel gentle stretching of the muscles in your lower leg when doing this exercise. If you feel pain, or your range of motion is limited, don't  Push too hard. Only go the limit your joint and muscles will let you go. If you have increasing pain, progressively worsening leg warmth or swelling, STOP the exercise and call your doctor. Below is information about the medications your doctor is prescribing after your visit: 
 
 
· Percocet · Ibuprofen Discharge Orders None Introducing hospitals SERVICES! [facility] introduces [employer] patient portal. Now you can access parts of your medical record, email your doctor's office, and request medication refills online. 1. In your internet browser, go to [email or web address] 2. Click on the First Time User? Click Here link in the Sign In box. You will see the New Member Sign Up page. 3. Enter your [employer] Access Code exactly as it appears below. You will not need to use this code after youve completed the sign-up process. If you do not sign up before the expiration date, you must request a new code. · [employer] Access Code: [email or web address] Expires: 10/18/2017  2:16 PM 
 
4. Enter the last four digits of your Social Security Number (xxxx) and Date of Birth (mm/dd/yyyy) as indicated and click Submit. You will be taken to the next sign-up page. 5. Create a [employer] ID. This will be your [employer] login ID and cannot be changed, so think of one that is secure and easy to remember. 6. Create a Heartscape password. You can change your password at any time. 7. Enter your Password Reset Question and Answer. This can be used at a later time if you forget your password. 8. Enter your e-mail address. You will receive e-mail notification when new information is available in [address] 9. Click Sign Up. You can now view and download portions of your medical record. 10. Click the Download Summary menu link to download a portable copy of your medical information. If you have questions, please visit the Frequently Asked Questions section of the [employer] website. Remember, [employer] is NOT to be used for urgent needs. For medical emergencies, dial 911. Now available from your iPhone and Android! General Information Please provide this summary of care documentation to your next provider. Patient Signature:  ____________________________________________________________ Date:  ____________________________________________________________  
  
Arianne Finger Provider Signature:  ____________________________________________________________ Date:  ____________________________________________________________

## 2017-07-28 NOTE — PERIOP NOTES
Dr Dilma Toro at [facility] postop to re-dress LLE after small amt bleeding noted. Currently, CDI. Mom at bs. Reviewed DC instructions and PT notified for walker training. Postop shoes applied.

## 2017-07-28 NOTE — ANESTHESIA PREPROCEDURE EVALUATION
Anesthetic History               Review of Systems / Medical History  Patient summary reviewed and nursing notes reviewed    Pulmonary  Within defined limits                 Neuro/Psych              Cardiovascular  Within defined limits                Exercise tolerance: >4 METS     GI/Hepatic/Renal               Comments: Crohn's disease: currently in remission Endo/Other  Within defined limits           Other Findings              Physical Exam    Airway  Mallampati: II    Neck ROM: normal range of motion   Mouth opening: Normal     Cardiovascular    Rhythm: regular  Rate: normal         Dental  No notable dental hx       Pulmonary  Breath sounds clear to auscultation               Abdominal         Other Findings            Anesthetic Plan    ASA: 2  Anesthesia type: MAC          Induction: Intravenous  Anesthetic plan and risks discussed with: Patient      Informed consent obtained.

## 2017-07-31 NOTE — CONSULTS
[address] [place] [address]   201 [facility], [address] [place] [address]   [address] [place] [address]       Name:  Fabián Parra   MR#:  [id number]   :  1995   Account #:  [de-identified]    Date of Consultation:  2017   Date of Adm:  2017       PREOPERATIVE DIAGNOSIS:   1. Right foot multiple plantar verruca. 2. Left foot multiple plantar verruca. POSTOPERATIVE DIAGNOSES:   1. Right foot multiple plantar verruca. 2. Left foot multiple plantar verruca. PROCEDURES PERFORMED:   1. Right foot excision of multiple plantar verruca. 2. Left foot excision of multiple plantar verruca. ANESTHESIA: MAC with 15 mL of 1:1 mixture of 1% lidocaine plain   and 0.5% Marcaine plain. HEMOSTASIS: Achieved with a pneumatic ankle tourniquet set at 250   mmHg, right and left ankle. ESTIMATED BLOOD LOSS: Minimal.    MATERIALS USED: 3-0 nylon. INJECTABLES: None. COMPLICATIONS: None. CONDITION: Stable. INDICATION FOR PROCEDURE: The patient is a 68-year-old female   that presented to the preoperative holding area for left and right foot   plantar warts, which have failed conservative treatment. She would like   to undergo surgical intervention to improve her pain. She has multiple   plantar verruca left and right foot. One large verruca left foot second   digit and 1 large verruca plantar aspect of right forefoot. She is here   with her mother who also is aware of the procedure and all the   complications associated with the procedure. The procedure was   discussed in detail along with the postoperative course with both the   patient and the mother. DESCRIPTION OF PROCEDURE: The patient was identified in the   preoperative holding area cleared by nursing and anesthesia. Site and   side were marked. Consent was signed. The patient is n.p.o. The   patient was taken back to the operating room and sedated with   anesthesia.  Attention was then directed to both lower extremities   where the feet and ankles were sterilely prepped and draped. All   surgical staff were scrubbed and gowned. Esmarch was used to   exsanguinate the left foot. Tourniquet was inflated and plantar verruca   were surgically excised. Large plantar verruca on the distal aspect of   the second digit was excised and sutured with 3-0 nylon. Attention was then directed to the right lower extremity. The right foot   was exsanguinated and the plantar verruca were surgically excised. A large plantar verruca on the plantar aspect of the forefoot was   excised and the area was sutured with 3-0 nylon. All other areas were   dressed with wound dressings, sterile 4 x 4's, Kerlix and Coban. The   patient was given appropriate postoperative instructions to remain   partial weightbearing on heel only. She was given postoperative pain   medication. She is to followup with Dr. Francine Madsen in 1 week.         CHARLOTTE House / Ravindra Cabello   D:  07/31/2017   17:30   T:  07/31/2017   18:09   Job #:  [id number]

## 2017-08-04 NOTE — OP NOTES
Previously Signed 17 - Filled incorrectly as Consult. Dictation copied below        [facility] [place] [address]   201 [facility]   [address] [facility]  Name:  Prachi Escalante   MR#:  [id number]   :  1995   Account #:  [de-identified]    Date of Consultation:  2017   Date of Adm:  2017         PREOPERATIVE DIAGNOSIS:   1. Right foot multiple plantar verruca. 2. Left foot multiple plantar verruca.     POSTOPERATIVE DIAGNOSES:   1. Right foot multiple plantar verruca. 2. Left foot multiple plantar verruca.     PROCEDURES PERFORMED:   1. Right foot excision of multiple plantar verruca. 2. Left foot excision of multiple plantar verruca.     ANESTHESIA: MAC with 15 mL of 1:1 mixture of 1% lidocaine plain   and 0.5% Marcaine plain.     HEMOSTASIS: Achieved with a pneumatic ankle tourniquet set at 250   mmHg, right and left ankle.     ESTIMATED BLOOD LOSS: Minimal.     MATERIALS USED: 3-0 nylon.     INJECTABLES: None.     COMPLICATIONS: None.     CONDITION: Stable.     INDICATION FOR PROCEDURE: The patient is a 65-year-old female   that presented to the preoperative holding area for left and right foot   plantar warts, which have failed conservative treatment. She would like   to undergo surgical intervention to improve her pain. She has multiple   plantar verruca left and right foot. One large verruca left foot second   digit and 1 large verruca plantar aspect of right forefoot. She is here   with her mother who also is aware of the procedure and all the   complications associated with the procedure. The procedure was   discussed in detail along with the postoperative course with both the   patient and the mother.     DESCRIPTION OF PROCEDURE: The patient was identified in the   preoperative holding area cleared by nursing and anesthesia. Site and   side were marked. Consent was signed. The patient is n.p.o.  The   patient was taken back to the operating room and sedated with   anesthesia. Attention was then directed to both lower extremities   where the feet and ankles were sterilely prepped and draped. All   surgical staff were scrubbed and gowned. Esmarch was used to   exsanguinate the left foot. Tourniquet was inflated and plantar verruca   were surgically excised. Large plantar verruca on the distal aspect of   the second digit was excised and sutured with 3-0 nylon.     Attention was then directed to the right lower extremity. The right foot   was exsanguinated and the plantar verruca were surgically excised. A large plantar verruca on the plantar aspect of the forefoot was   excised and the area was sutured with 3-0 nylon. All other areas were   dressed with wound dressings, sterile 4 x 4's, Kerlix and Coban. The   patient was given appropriate postoperative instructions to remain   partial weightbearing on heel only. She was given postoperative pain   medication.  She is to followup with Dr. Serenity Servin in 1 week.           CHARLOTTE Liu / Marques Kumar   D:  07/31/2017   17:30   T:  07/31/2017   18:09   Job #:  [id number]

## 2017-11-20 ENCOUNTER — OFFICE VISIT (OUTPATIENT)
Dept: INTERNAL MEDICINE CLINIC | Age: 22
End: 2017-11-20

## 2017-11-20 VITALS
HEIGHT: 65 IN | RESPIRATION RATE: 18 BRPM | OXYGEN SATURATION: 98 % | SYSTOLIC BLOOD PRESSURE: 128 MMHG | HEART RATE: 80 BPM | BODY MASS INDEX: 27.66 KG/M2 | DIASTOLIC BLOOD PRESSURE: 82 MMHG | TEMPERATURE: 96.2 F | WEIGHT: 166 LBS

## 2017-11-20 DIAGNOSIS — F32.A ANXIETY AND DEPRESSION: Primary | ICD-10-CM

## 2017-11-20 DIAGNOSIS — R41.840 DIFFICULTY CONCENTRATING: ICD-10-CM

## 2017-11-20 DIAGNOSIS — Z23 ENCOUNTER FOR IMMUNIZATION: ICD-10-CM

## 2017-11-20 DIAGNOSIS — F41.9 ANXIETY AND DEPRESSION: Primary | ICD-10-CM

## 2017-11-20 RX ORDER — ESCITALOPRAM OXALATE 10 MG/1
15 TABLET ORAL DAILY
COMMUNITY
Start: 2017-11-16 | End: 2017-11-20

## 2017-11-20 RX ORDER — ESCITALOPRAM OXALATE 5 MG/1
15 TABLET ORAL DAILY
COMMUNITY
Start: 2017-11-16 | End: 2017-11-20

## 2017-11-20 RX ORDER — BUPROPION HYDROCHLORIDE 150 MG/1
150 TABLET ORAL
Qty: 30 TAB | Refills: 0 | Status: SHIPPED | OUTPATIENT
Start: 2017-11-20 | End: 2017-11-29 | Stop reason: SDUPTHER

## 2017-11-20 NOTE — MR AVS SNAPSHOT
Visit Information Date & Time Provider Department Dept. Phone Encounter #  
 11/20/2017  3:15 PM Celestine Ramirez MD [facility] Internal Medicine [phone] [id number] Follow-up Instructions Return in about 1 month (around 12/20/2017) for pepeSamreen Epps Upcoming Health Maintenance Date Due Hepatitis A Peds Age 1-18 (1 of 2 - Standard Series) 12/11/1998 DTaP/Tdap/Td series (1 - Tdap) 12/11/2004 HPV AGE 9Y-26Y (1 of 3 - Female 3 Dose Series) 12/11/2008 Influenza Age 5 to Adult 8/1/2017 Allergies as of 11/20/2017  Review Complete On: 11/20/2017 By: Celestine Ramirez MD  
 No Known Allergies Current Immunizations  Never Reviewed Name Date Influenza Vaccine (Quad) PF  Incomplete TB Skin Test (PPD) Intradermal 12/21/2016 Not reviewed this visit You Were Diagnosed With   
  
 Codes Comments Anxiety and depression    -  Primary ICD-10-CM: F41.8 ICD-9-CM: 300.00, 311 Encounter for immunization     ICD-10-CM: S62 ICD-9-CM: V03.89 Difficulty concentrating     ICD-10-CM: R41.840 ICD-9-CM: 799.51 Vitals BP Pulse Temp Resp Height(growth percentile) Weight(growth percentile) 128/82 (97 %/ 96 %)* (BP 1 Location: Left arm, BP Patient Position: Sitting) 80 96.2 °F (35.7 °C) (Oral) 18 5' 5\" (1.651 m) (61 %, Z= 0.27) 166 lb (75.3 kg) (90 %, Z= 1.28) LMP SpO2 BMI OB Status Smoking Status 10/20/2017 98% 27.62 kg/m2 (89 %, Z= 1.21) Having regular periods Never Smoker *BP percentiles are based on NHBPEP's 4th Report Growth percentiles are based on CDC 2-20 Years data. Vitals History BMI and BSA Data Body Mass Index Body Surface Area  
 27.62 kg/m 2 1.86 m 2 Preferred Pharmacy Pharmacy Name Phone [address] [facility], [address] [place] [address] [phone] Your Updated Medication List  
  
   
This list is accurate as of: 11/20/17  3:42 PM.  Always use your most recent med list.  
  
  
  
 APRISO 0.375 gram 24 hour capsule Generic drug:  mesalamine ER Take 1.5 g by mouth daily. buPROPion  mg tablet Commonly known as:  Tom Greaser Take 1 Tab by mouth every morning. ferrous sulfate 325 mg (65 mg iron) tablet Take  by mouth Daily (before breakfast). HUMIRA PEN 40 mg/0.8 mL injection pen Generic drug:  adalimumab  
by SubCUTAneous route once. Every other week  
  
 omeprazole 40 mg capsule Commonly known as:  PRILOSEC Take 40 mg by mouth daily. Prescriptions Sent to Pharmacy Refills buPROPion XL (WELLBUTRIN XL) 150 mg tablet 0 Sig: Take 1 Tab by mouth every morning. Class: Normal  
 Pharmacy: [address] [place] [address] Ph #: [phone] Route: Oral  
  
We Performed the Following INFLUENZA VIRUS VAC QUAD,SPLIT,PRESV FREE SYRINGE IM [id number] CPT(R)] REFERRAL TO PSYCHIATRY [REF91 Custom] Follow-up Instructions Return in about 1 month (around 12/20/2017) for anxiey. Christa Fleming Referral Information Referral ID Referred By Referred To  
  
 [id number] [facility] [address] [facility] [address] [facility], [address] [facility] Phone: [phone] Fax: [phone] Visits Status Start Date End Date 1 New Request 11/20/17 11/20/18 If your referral has a status of pending review or denied, additional information will be sent to support the outcome of this decision. Introducing [facility]! [facility] introduces [employer] patient portal. Now you can access parts of your medical record, email your doctor's office, and request medication refills online. 1. In your internet browser, go to [email or web address] 2. Click on the First Time User? Click Here link in the Sign In box. You will see the New Member Sign Up page. 3. Enter your [employer] Access Code exactly as it appears below. You will not need to use this code after youve completed the sign-up process. If you do not sign up before the expiration date, you must request a new code. · [employer] Access Code: [email or web address] Expires: 1/20/2018  7:47 AM 
 
4. Enter the last four digits of your Social Security Number (xxxx) and Date of Birth (mm/dd/yyyy) as indicated and click Submit. You will be taken to the next sign-up page. 5. Create a [employer] ID. This will be your [employer] login ID and cannot be changed, so think of one that is secure and easy to remember. 6. Create a [employer] password. You can change your password at any time. 7. Enter your Password Reset Question and Answer. This can be used at a later time if you forget your password. 8. Enter your e-mail address. You will receive e-mail notification when new information is available in [address] 9. Click Sign Up. You can now view and download portions of your medical record. 10. Click the Download Summary menu link to download a portable copy of your medical information. If you have questions, please visit the Frequently Asked Questions section of the [employer] website. Remember, [employer] is NOT to be used for urgent needs. For medical emergencies, dial 911. Now available from your iPhone and Android! Please provide this summary of care documentation to your next provider. Your primary care clinician is listed as Eliu Acharya. If you have any questions after today's visit, please call [phone].

## 2017-11-21 NOTE — PROGRESS NOTES
Subjective:     Chief Complaint   Patient presents with    Depression     see psychiatrist at school. was advised to get checked for adhd    Referral Request        She  is a 25y.o. year old female with h/o crohn's d/s who presents today to discuss about possibility of evaluating for ADHD. Patient reports that the depression started when she was diagnosed with Crohn's d/s . She reports that her dad not being in her life also triggers the anxiety and depression. Reports that she has seen the medical doctor in her university 3-4 months ago due to the concern about difficulty finishing her task, struggling to put attention and focus in the classes. She was started on Lexapro 3-4 months ago with no relief of her symptoms. She was advised to have a ADHD eval.    Her mom was diagnosed with ADHD as well. Pertinent items are noted in HPI. Objective:     Vitals:    11/20/17 1528   BP: 128/82   Pulse: 80   Resp: 18   Temp: 96.2 °F (35.7 °C)   TempSrc: Oral   SpO2: 98%   Weight: 166 lb (75.3 kg)   Height: 5' 5\" (1.651 m)       Physical Examination: General appearance - alert, well appearing, and in no distress, oriented to person, place, and time and normal appearing weight  Mental status - alert, oriented to person, place, and time, normal mood, behavior, speech, dress, motor activity, and thought processes. She was tearful but consolable. Chest - clear to auscultation, no wheezes, rales or rhonchi, symmetric air entry  Heart - normal rate, regular rhythm, normal S1, S2, no murmurs, rubs, clicks or gallops.      No Known Allergies   Social History     Social History    Marital status: SINGLE     Spouse name: N/A    Number of children: N/A    Years of education: N/A     Social History Main Topics    Smoking status: Never Smoker    Smokeless tobacco: Never Used    Alcohol use No    Drug use: No    Sexual activity: No     Other Topics Concern    None     Social History Narrative      Family History   Problem Relation Age of Onset    Thyroid Disease Mother     No Known Problems Father       History reviewed. No pertinent surgical history. Past Medical History:   Diagnosis Date    Anemia     Crohn disease ([facility] [address]       Current Outpatient Prescriptions   Medication Sig Dispense Refill    buPROPion XL (WELLBUTRIN XL) 150 mg tablet Take 1 Tab by mouth every morning. 30 Tab 0    adalimumab (HUMIRA PEN) 40 mg/0.8 mL injection pen by SubCUTAneous route once. Every other week      omeprazole (PRILOSEC) 40 mg capsule Take 40 mg by mouth daily.  ferrous sulfate 325 mg (65 mg iron) tablet Take  by mouth Daily (before breakfast).  APRISO 0.375 gram capsule Take 1.5 g by mouth daily. Assessment/ Plan:   Diagnoses and all orders for this visit:    1. Anxiety and depression  -     Start buPROPion XL (WELLBUTRIN XL) 150 mg tablet; Take 1 Tab by mouth every morning.  -     REFERRAL TO PSYCHIATRY    2. Difficulty concentrating  -     Start buPROPion XL (WELLBUTRIN XL) 150 mg tablet; Take 1 Tab by mouth every morning.  -     REFERRAL TO PSYCHIATRY for ADHD eval.     3. Encounter for immunization  -     INFLUENZA VIRUS VACCINE QUADRIVALENT, PRESERVATIVE FREE SYRINGE (17355)           Medication risks/benefits/costs/interactions/alternatives discussed with patient. Advised patient to call back or return to office if symptoms worsen/change/persist. If patient cannot reach us or should anything more severe/urgent arise he/she should proceed directly to the nearest emergency department. Discussed expected course/resolution/complications of diagnosis in detail with patient. Patient given a written after visit summary which includes her diagnoses, current medications and vitals. Patient expressed understanding with the diagnosis and plan. Follow-up Disposition:  Return in about 1 month (around 12/20/2017) for alison Lopez

## 2017-11-29 ENCOUNTER — TELEPHONE (OUTPATIENT)
Dept: INTERNAL MEDICINE CLINIC | Age: 22
End: 2017-11-29

## 2017-11-29 DIAGNOSIS — F41.9 ANXIETY AND DEPRESSION: ICD-10-CM

## 2017-11-29 DIAGNOSIS — R41.840 DIFFICULTY CONCENTRATING: ICD-10-CM

## 2017-11-29 DIAGNOSIS — F32.A ANXIETY AND DEPRESSION: ICD-10-CM

## 2017-11-29 RX ORDER — BUPROPION HYDROCHLORIDE 150 MG/1
150 TABLET ORAL
Qty: 30 TAB | Refills: 0 | Status: SHIPPED | OUTPATIENT
Start: 2017-11-29 | End: 2018-01-09 | Stop reason: SDUPTHER

## 2017-11-29 NOTE — TELEPHONE ENCOUNTER
Pt was unable to  prescriptions before going back to school and would like them sent to   [facility], office: [phone] did not have fax number.

## 2018-01-09 DIAGNOSIS — F41.9 ANXIETY AND DEPRESSION: ICD-10-CM

## 2018-01-09 DIAGNOSIS — R41.840 DIFFICULTY CONCENTRATING: ICD-10-CM

## 2018-01-09 DIAGNOSIS — F32.A ANXIETY AND DEPRESSION: ICD-10-CM

## 2018-01-11 RX ORDER — BUPROPION HYDROCHLORIDE 150 MG/1
150 TABLET ORAL
Qty: 30 TAB | Refills: 2 | Status: SHIPPED | OUTPATIENT
Start: 2018-01-11 | End: 2019-01-08

## 2019-01-08 ENCOUNTER — OFFICE VISIT (OUTPATIENT)
Dept: PRIMARY CARE CLINIC | Age: 24
End: 2019-01-08

## 2019-01-08 VITALS
SYSTOLIC BLOOD PRESSURE: 110 MMHG | HEART RATE: 79 BPM | WEIGHT: 156 LBS | BODY MASS INDEX: 25.99 KG/M2 | HEIGHT: 65 IN | TEMPERATURE: 98.3 F | DIASTOLIC BLOOD PRESSURE: 77 MMHG | OXYGEN SATURATION: 98 % | RESPIRATION RATE: 17 BRPM

## 2019-01-08 DIAGNOSIS — L40.9 PSORIASIS: ICD-10-CM

## 2019-01-08 DIAGNOSIS — F41.9 ANXIETY: Primary | ICD-10-CM

## 2019-01-08 RX ORDER — TRIAMCINOLONE ACETONIDE 5 MG/G
OINTMENT TOPICAL 2 TIMES DAILY
Qty: 30 G | Refills: 0 | Status: SHIPPED | OUTPATIENT
Start: 2019-01-08 | End: 2020-01-21 | Stop reason: SDUPTHER

## 2019-01-08 RX ORDER — ESCITALOPRAM OXALATE 10 MG/1
10 TABLET ORAL DAILY
Qty: 90 TAB | Refills: 0 | Status: SHIPPED | OUTPATIENT
Start: 2019-01-08 | End: 2019-03-06 | Stop reason: SDUPTHER

## 2019-01-08 NOTE — PROGRESS NOTES
Subjective:     Chief Complaint   Patient presents with    Anxiety     wants to restart lexapro        She  is a 26y.o. year old female with hx of Crohn's D/s, anxiety , psoriasis who presents today to get medication refill as well as some acute concerns. She is here with her mom. Patient reports that she did not like the Wellbutrin which I prescribed back in 2017. When she went back to college she seen physician at [facility] who prescribed her Lexapro 10 mg. Reports that lexapro worked better. She is out of Lexapro for the past few weeks, would like to restart the med. Hx of psoriasis: Mostly in her back of the both ear. Reports that GI did prescribe some kind of steroid cream but she is out of it now. Reports that psoriasis has flared up recently. Denies any chest pain, soa, cough, sleeping difficulties. . No GI complain. Pertinent items are noted in HPI. Objective:     Vitals:    01/08/19 1450   BP: 110/77   Pulse: 79   Resp: 17   Temp: 98.3 °F (36.8 °C)   TempSrc: Oral   SpO2: 98%   Weight: 156 lb (70.8 kg)   Height: 5' 5\" (1.651 m)       Physical Examination: General appearance - alert, well appearing, and in no distress, oriented to person, place, and time and normal appearing weight  Mental status - alert, oriented to person, place, and time, normal mood, behavior, speech, dress, motor activity, and thought processes  Ears - bilateral TM's and external ear canals normal,  Chest - clear to auscultation, no wheezes, rales or rhonchi, symmetric air entry  Heart - normal rate, regular rhythm, normal S1, S2, no murmurs, rubs, clicks or gallops  Skin: cracked skin noted back of her right auricle and lob with flaky skin. Mild dry flaky thick skin noted on back of the right ear lobe.      No Known Allergies   Social History     Socioeconomic History    Marital status: SINGLE     Spouse name: Not on file    Number of children: Not on file    Years of education: Not on file    Highest education level: Not on file   Tobacco Use    Smoking status: Never Smoker    Smokeless tobacco: Never Used   Substance and Sexual Activity    Alcohol use: No    Drug use: No    Sexual activity: No      Family History   Problem Relation Age of Onset    Thyroid Disease Mother     No Known Problems Father       No past surgical history on file. Past Medical History:   Diagnosis Date    Anemia     Crohn disease ([address]       Current Outpatient Medications   Medication Sig Dispense Refill    levonorgestrel (MIRENA) 20 mcg/24 hr (5 years) IUD 1 Device by IntraUTERine route once.  escitalopram oxalate (LEXAPRO) 10 mg tablet Take 1 Tab by mouth daily. 90 Tab 0    triamcinolone acetonide (KENALOG) 0.5 % ointment Apply  to affected area two (2) times a day. use thin layer 30 g 0    adalimumab (HUMIRA PEN) 40 mg/0.8 mL injection pen by SubCUTAneous route once. Every other week      APRISO 0.375 gram capsule Take 1.5 g by mouth daily.  omeprazole (PRILOSEC) 40 mg capsule Take 40 mg by mouth daily.  ferrous sulfate 325 mg (65 mg iron) tablet Take  by mouth Daily (before breakfast). Assessment/ Plan:   Diagnoses and all orders for this visit:    1. Anxiety  -   Restart   escitalopram oxalate (LEXAPRO) 10 mg tablet; Take 1 Tab by mouth daily. 2. Psoriasis  -    Start  triamcinolone acetonide (KENALOG) 0.5 % ointment; Apply  to affected area two (2) times a day. use thin layer           Medication risks/benefits/costs/interactions/alternatives discussed with patient. Advised patient to call back or return to office if symptoms worsen/change/persist. If patient cannot reach us or should anything more severe/urgent arise he/she should proceed directly to the nearest emergency department. Discussed expected course/resolution/complications of diagnosis in detail with patient. Patient given a written after visit summary which includes her diagnoses, current medications and vitals.   Patient expressed understanding with the diagnosis and plan. Follow-up Disposition:  Return in about 3 months (around 4/8/2019) for anxiety, psoriasis. Shruti Herrera

## 2019-01-23 ENCOUNTER — TELEPHONE (OUTPATIENT)
Dept: PRIMARY CARE CLINIC | Age: 24
End: 2019-01-23

## 2019-01-23 NOTE — TELEPHONE ENCOUNTER
Admitted 1/18/19 in [facility]    Infected abcess outside of her intestines- complications due to her chrones     Dana Hwang will be submitting another [facility] paperwork when she comes in

## 2019-01-23 NOTE — TELEPHONE ENCOUNTER
Mom stated she expects her to be in the hospital for a while. Mrs. Veronica Anton is coming in on Tuesday- will try to remind her to have her daughter make an appt.

## 2019-06-12 ENCOUNTER — OFFICE VISIT (OUTPATIENT)
Dept: PRIMARY CARE CLINIC | Age: 24
End: 2019-06-12

## 2019-06-12 VITALS
BODY MASS INDEX: 24.96 KG/M2 | WEIGHT: 149.8 LBS | SYSTOLIC BLOOD PRESSURE: 128 MMHG | HEIGHT: 65 IN | TEMPERATURE: 98.2 F | RESPIRATION RATE: 17 BRPM | OXYGEN SATURATION: 100 % | DIASTOLIC BLOOD PRESSURE: 89 MMHG | HEART RATE: 89 BPM

## 2019-06-12 DIAGNOSIS — L40.9 PSORIASIS: ICD-10-CM

## 2019-06-12 DIAGNOSIS — K50.014 CROHN'S DISEASE OF SMALL INTESTINE WITH ABSCESS (HCC): ICD-10-CM

## 2019-06-12 DIAGNOSIS — F41.9 ANXIETY: Primary | ICD-10-CM

## 2019-06-12 RX ORDER — ESCITALOPRAM OXALATE 20 MG/1
20 TABLET ORAL DAILY
Qty: 30 TAB | Refills: 3 | Status: SHIPPED | OUTPATIENT
Start: 2019-06-12 | End: 2019-10-25 | Stop reason: SDUPTHER

## 2019-06-12 NOTE — PROGRESS NOTES
Subjective:     Chief Complaint   Patient presents with    Anxiety     3 month f/u    Psoriasis        She  is a 26y.o. year old female with hx of Crohn's D/s, anxiety , psoriasis who presents today to get medication refill as well as some acute concerns.      Anxiety: She is currently on Lexapro 10 mg. Anxiety is getting worse lately because of job searching.       Hx of psoriasis: Mostly in her back of the both ear. Triamcinolone has been working really well. Crohn's d/s: Patient has not established with GI here yet. She just came back from [place] to [place] after graduating from college. GI symptoms been managed by current med.      Denies any chest pain, soa, cough, sleeping difficulties. . No GI complain. Pertinent items are noted in HPI. Objective:     Vitals:    06/12/19 1427   BP: 128/89   Pulse: 89   Resp: 17   Temp: 98.2 °F (36.8 °C)   TempSrc: Oral   SpO2: 100%   Weight: 149 lb 12.8 oz (67.9 kg)   Height: 5' 5\" (1.651 m)       Physical Examination: General appearance - alert, well appearing, and in no distress, oriented to person, place, and time and normal appearing weight  Mental status - alert, oriented to person, place, and time, normal mood, behavior, speech, dress, motor activity, and thought processes  Chest - clear to auscultation, no wheezes, rales or rhonchi, symmetric air entry  Heart - normal rate, regular rhythm, normal S1, S2, no murmurs, rubs, clicks or gallops  Abdomen - soft, nontender, nondistended, no masses or organomegaly.   Skin: Normal    No Known Allergies   Social History     Socioeconomic History    Marital status: SINGLE     Spouse name: Not on file    Number of children: Not on file    Years of education: Not on file    Highest education level: Not on file   Tobacco Use    Smoking status: Never Smoker    Smokeless tobacco: Never Used   Substance and Sexual Activity    Alcohol use: No    Drug use: No    Sexual activity: Never      Family History Problem Relation Age of Onset    Thyroid Disease Mother     No Known Problems Father       History reviewed. No pertinent surgical history. Past Medical History:   Diagnosis Date    Anemia     Crohn disease ([facility] [address]       Current Outpatient Medications   Medication Sig Dispense Refill    escitalopram oxalate (LEXAPRO) 10 mg tablet Take 1 Tab by mouth daily. 30 Tab 1    triamcinolone acetonide (KENALOG) 0.5 % ointment Apply  to affected area two (2) times a day. use thin layer 30 g 0    adalimumab (HUMIRA PEN) 40 mg/0.8 mL injection pen by SubCUTAneous route once. Every other week      APRISO 0.375 gram capsule Take 1.5 g by mouth daily.  levonorgestrel (MIRENA) 20 mcg/24 hr (5 years) IUD 1 Device by IntraUTERine route once.  omeprazole (PRILOSEC) 40 mg capsule Take 40 mg by mouth daily.  ferrous sulfate 325 mg (65 mg iron) tablet Take  by mouth Daily (before breakfast). Assessment/ Plan:   Diagnoses and all orders for this visit:    1. Anxiety  -    Anxiety is worse lately. Will increase escitalopram oxalate (LEXAPRO) 20 mg tablet; Take 1 Tab by mouth daily.  -     TSH 3RD GENERATION        - counseling provided. 2. Psoriasis       - well controlled with as needed steroid cream.   3. Crohn's disease of small intestine with abscess ([facility] [address]  -    Strongly advised to establish a care in [place]. REFERRAL TO GASTROENTEROLOGY  -     CBC WITH AUTOMATED DIFF  -     METABOLIC PANEL, COMPREHENSIVE           Medication risks/benefits/costs/interactions/alternatives discussed with patient. Advised patient to call back or return to office if symptoms worsen/change/persist. If patient cannot reach us or should anything more severe/urgent arise he/she should proceed directly to the nearest emergency department. Discussed expected course/resolution/complications of diagnosis in detail with patient.   Patient given a written after visit summary which includes her diagnoses, current medications and vitals. Patient expressed understanding with the diagnosis and plan. Follow-up and Dispositions    · Return in about 2 months (around 8/12/2019) for anxiety. Pieter Johnson

## 2019-06-13 LAB
ALBUMIN SERPL-MCNC: 4.2 G/DL (ref 3.5–5.5)
ALBUMIN/GLOB SERPL: 1.4 {RATIO} (ref 1.2–2.2)
ALP SERPL-CCNC: 67 IU/L (ref 39–117)
ALT SERPL-CCNC: 13 IU/L (ref 0–32)
AST SERPL-CCNC: 20 IU/L (ref 0–40)
BASOPHILS # BLD AUTO: 0 X10E3/UL (ref 0–0.2)
BASOPHILS NFR BLD AUTO: 0 %
BILIRUB SERPL-MCNC: <0.2 MG/DL (ref 0–1.2)
BUN SERPL-MCNC: 7 MG/DL (ref 6–20)
BUN/CREAT SERPL: 11 (ref 9–23)
CALCIUM SERPL-MCNC: 9.1 MG/DL (ref 8.7–10.2)
CHLORIDE SERPL-SCNC: 100 MMOL/L (ref 96–106)
CO2 SERPL-SCNC: 23 MMOL/L (ref 20–29)
CREAT SERPL-MCNC: 0.63 MG/DL (ref 0.57–1)
EOSINOPHIL # BLD AUTO: 0.1 X10E3/UL (ref 0–0.4)
EOSINOPHIL NFR BLD AUTO: 1 %
ERYTHROCYTE [DISTWIDTH] IN BLOOD BY AUTOMATED COUNT: 13.9 % (ref 12.3–15.4)
GLOBULIN SER CALC-MCNC: 3.1 G/DL (ref 1.5–4.5)
GLUCOSE SERPL-MCNC: 72 MG/DL (ref 65–99)
HCT VFR BLD AUTO: 38.3 % (ref 34–46.6)
HGB BLD-MCNC: 12.5 G/DL (ref 11.1–15.9)
IMM GRANULOCYTES # BLD AUTO: 0 X10E3/UL (ref 0–0.1)
IMM GRANULOCYTES NFR BLD AUTO: 0 %
LYMPHOCYTES # BLD AUTO: 0.6 X10E3/UL (ref 0.7–3.1)
LYMPHOCYTES NFR BLD AUTO: 10 %
MCH RBC QN AUTO: 27.9 PG (ref 26.6–33)
MCHC RBC AUTO-ENTMCNC: 32.6 G/DL (ref 31.5–35.7)
MCV RBC AUTO: 86 FL (ref 79–97)
MONOCYTES # BLD AUTO: 0.6 X10E3/UL (ref 0.1–0.9)
MONOCYTES NFR BLD AUTO: 10 %
NEUTROPHILS # BLD AUTO: 4.7 X10E3/UL (ref 1.4–7)
NEUTROPHILS NFR BLD AUTO: 79 %
PLATELET # BLD AUTO: 323 X10E3/UL (ref 150–450)
POTASSIUM SERPL-SCNC: 4.4 MMOL/L (ref 3.5–5.2)
PROT SERPL-MCNC: 7.3 G/DL (ref 6–8.5)
RBC # BLD AUTO: 4.48 X10E6/UL (ref 3.77–5.28)
SODIUM SERPL-SCNC: 139 MMOL/L (ref 134–144)
TSH SERPL DL<=0.005 MIU/L-ACNC: 0.84 UIU/ML (ref 0.45–4.5)
WBC # BLD AUTO: 5.9 X10E3/UL (ref 3.4–10.8)

## 2019-09-08 ENCOUNTER — HOSPITAL ENCOUNTER (INPATIENT)
Age: 24
LOS: 13 days | Discharge: HOME | DRG: 853 | End: 2019-09-21
Attending: EMERGENCY MEDICINE | Admitting: INTERNAL MEDICINE
Payer: COMMERCIAL

## 2019-09-08 ENCOUNTER — APPOINTMENT (OUTPATIENT)
Dept: GENERAL RADIOLOGY | Age: 24
DRG: 853 | End: 2019-09-08
Attending: EMERGENCY MEDICINE
Payer: COMMERCIAL

## 2019-09-08 ENCOUNTER — APPOINTMENT (OUTPATIENT)
Dept: CT IMAGING | Age: 24
DRG: 853 | End: 2019-09-08
Attending: EMERGENCY MEDICINE
Payer: COMMERCIAL

## 2019-09-08 DIAGNOSIS — B07.0 PLANTAR WART OF RIGHT FOOT: ICD-10-CM

## 2019-09-08 DIAGNOSIS — K65.1 INTRA-ABDOMINAL ABSCESS (HCC): Primary | ICD-10-CM

## 2019-09-08 DIAGNOSIS — K65.1 ABSCESS OF ABDOMINAL CAVITY (HCC): ICD-10-CM

## 2019-09-08 LAB
ALBUMIN SERPL-MCNC: 3.2 G/DL (ref 3.5–5)
ALBUMIN/GLOB SERPL: 0.6 {RATIO} (ref 1.1–2.2)
ALP SERPL-CCNC: 80 U/L (ref 45–117)
ALT SERPL-CCNC: 16 U/L (ref 12–78)
ANION GAP SERPL CALC-SCNC: 7 MMOL/L (ref 5–15)
APPEARANCE UR: CLEAR
AST SERPL-CCNC: 12 U/L (ref 15–37)
BACTERIA URNS QL MICRO: NEGATIVE /HPF
BASOPHILS # BLD: 0 K/UL (ref 0–0.1)
BASOPHILS NFR BLD: 0 % (ref 0–1)
BILIRUB SERPL-MCNC: 0.5 MG/DL (ref 0.2–1)
BILIRUB UR QL: NEGATIVE
BUN SERPL-MCNC: 6 MG/DL (ref 6–20)
BUN/CREAT SERPL: 8 (ref 12–20)
CALCIUM SERPL-MCNC: 9.1 MG/DL (ref 8.5–10.1)
CHLORIDE SERPL-SCNC: 97 MMOL/L (ref 97–108)
CO2 SERPL-SCNC: 29 MMOL/L (ref 21–32)
COLOR UR: NORMAL
COMMENT, HOLDF: NORMAL
CREAT SERPL-MCNC: 0.75 MG/DL (ref 0.55–1.02)
DIFFERENTIAL METHOD BLD: ABNORMAL
EOSINOPHIL # BLD: 0 K/UL (ref 0–0.4)
EOSINOPHIL NFR BLD: 0 % (ref 0–7)
EPITH CASTS URNS QL MICRO: NORMAL /LPF
ERYTHROCYTE [DISTWIDTH] IN BLOOD BY AUTOMATED COUNT: 12.8 % (ref 11.5–14.5)
GLOBULIN SER CALC-MCNC: 5.5 G/DL (ref 2–4)
GLUCOSE SERPL-MCNC: 119 MG/DL (ref 65–100)
GLUCOSE UR STRIP.AUTO-MCNC: NEGATIVE MG/DL
HCG UR QL: NEGATIVE
HCT VFR BLD AUTO: 39.9 % (ref 35–47)
HGB BLD-MCNC: 12.3 G/DL (ref 11.5–16)
HGB UR QL STRIP: NEGATIVE
HYALINE CASTS URNS QL MICRO: NORMAL /LPF (ref 0–5)
IMM GRANULOCYTES # BLD AUTO: 0.1 K/UL (ref 0–0.04)
IMM GRANULOCYTES NFR BLD AUTO: 1 % (ref 0–0.5)
KETONES UR QL STRIP.AUTO: NEGATIVE MG/DL
LACTATE BLD-SCNC: 0.45 MMOL/L (ref 0.4–2)
LEUKOCYTE ESTERASE UR QL STRIP.AUTO: NEGATIVE
LYMPHOCYTES # BLD: 0.2 K/UL (ref 0.8–3.5)
LYMPHOCYTES NFR BLD: 3 % (ref 12–49)
MCH RBC QN AUTO: 26.1 PG (ref 26–34)
MCHC RBC AUTO-ENTMCNC: 30.8 G/DL (ref 30–36.5)
MCV RBC AUTO: 84.5 FL (ref 80–99)
MONOCYTES # BLD: 0.6 K/UL (ref 0–1)
MONOCYTES NFR BLD: 8 % (ref 5–13)
NEUTS SEG # BLD: 7.2 K/UL (ref 1.8–8)
NEUTS SEG NFR BLD: 88 % (ref 32–75)
NITRITE UR QL STRIP.AUTO: NEGATIVE
NRBC # BLD: 0 K/UL (ref 0–0.01)
NRBC BLD-RTO: 0 PER 100 WBC
PH UR STRIP: 7.5 [PH] (ref 5–8)
PLATELET # BLD AUTO: 276 K/UL (ref 150–400)
PMV BLD AUTO: 10 FL (ref 8.9–12.9)
POTASSIUM SERPL-SCNC: 3.6 MMOL/L (ref 3.5–5.1)
PROT SERPL-MCNC: 8.7 G/DL (ref 6.4–8.2)
PROT UR STRIP-MCNC: NEGATIVE MG/DL
RBC # BLD AUTO: 4.72 M/UL (ref 3.8–5.2)
RBC #/AREA URNS HPF: NORMAL /HPF (ref 0–5)
RBC MORPH BLD: ABNORMAL
SAMPLES BEING HELD,HOLD: NORMAL
SODIUM SERPL-SCNC: 133 MMOL/L (ref 136–145)
SP GR UR REFRACTOMETRY: 1.01 (ref 1–1.03)
UR CULT HOLD, URHOLD: NORMAL
UROBILINOGEN UR QL STRIP.AUTO: 0.2 EU/DL (ref 0.2–1)
WBC # BLD AUTO: 8.1 K/UL (ref 3.6–11)
WBC URNS QL MICRO: NORMAL /HPF (ref 0–4)

## 2019-09-08 PROCEDURE — 80053 COMPREHEN METABOLIC PANEL: CPT

## 2019-09-08 PROCEDURE — 74011000258 HC RX REV CODE- 258: Performed by: EMERGENCY MEDICINE

## 2019-09-08 PROCEDURE — 93005 ELECTROCARDIOGRAM TRACING: CPT

## 2019-09-08 PROCEDURE — 74011250636 HC RX REV CODE- 250/636: Performed by: EMERGENCY MEDICINE

## 2019-09-08 PROCEDURE — 96375 TX/PRO/DX INJ NEW DRUG ADDON: CPT

## 2019-09-08 PROCEDURE — 74011250636 HC RX REV CODE- 250/636: Performed by: INTERNAL MEDICINE

## 2019-09-08 PROCEDURE — 85025 COMPLETE CBC W/AUTO DIFF WBC: CPT

## 2019-09-08 PROCEDURE — 81025 URINE PREGNANCY TEST: CPT

## 2019-09-08 PROCEDURE — 74011250637 HC RX REV CODE- 250/637: Performed by: EMERGENCY MEDICINE

## 2019-09-08 PROCEDURE — 74011636320 HC RX REV CODE- 636/320: Performed by: RADIOLOGY

## 2019-09-08 PROCEDURE — 71045 X-RAY EXAM CHEST 1 VIEW: CPT

## 2019-09-08 PROCEDURE — 81001 URINALYSIS AUTO W/SCOPE: CPT

## 2019-09-08 PROCEDURE — 74011000258 HC RX REV CODE- 258: Performed by: RADIOLOGY

## 2019-09-08 PROCEDURE — 36415 COLL VENOUS BLD VENIPUNCTURE: CPT

## 2019-09-08 PROCEDURE — 65270000029 HC RM PRIVATE

## 2019-09-08 PROCEDURE — 74177 CT ABD & PELVIS W/CONTRAST: CPT

## 2019-09-08 PROCEDURE — 83605 ASSAY OF LACTIC ACID: CPT

## 2019-09-08 PROCEDURE — 87040 BLOOD CULTURE FOR BACTERIA: CPT

## 2019-09-08 PROCEDURE — 99285 EMERGENCY DEPT VISIT HI MDM: CPT

## 2019-09-08 PROCEDURE — 96365 THER/PROPH/DIAG IV INF INIT: CPT

## 2019-09-08 RX ORDER — LANOLIN ALCOHOL/MO/W.PET/CERES
1 CREAM (GRAM) TOPICAL
Status: DISCONTINUED | OUTPATIENT
Start: 2019-09-09 | End: 2019-09-19

## 2019-09-08 RX ORDER — SODIUM CHLORIDE 0.9 % (FLUSH) 0.9 %
10 SYRINGE (ML) INJECTION
Status: COMPLETED | OUTPATIENT
Start: 2019-09-08 | End: 2019-09-08

## 2019-09-08 RX ORDER — HYDROMORPHONE HYDROCHLORIDE 1 MG/ML
0.5 INJECTION, SOLUTION INTRAMUSCULAR; INTRAVENOUS; SUBCUTANEOUS
Status: DISCONTINUED | OUTPATIENT
Start: 2019-09-08 | End: 2019-09-13

## 2019-09-08 RX ORDER — SODIUM CHLORIDE 0.9 % (FLUSH) 0.9 %
5-10 SYRINGE (ML) INJECTION AS NEEDED
Status: DISCONTINUED | OUTPATIENT
Start: 2019-09-08 | End: 2019-09-21 | Stop reason: HOSPADM

## 2019-09-08 RX ORDER — ONDANSETRON 2 MG/ML
4 INJECTION INTRAMUSCULAR; INTRAVENOUS
Status: DISCONTINUED | OUTPATIENT
Start: 2019-09-08 | End: 2019-09-21 | Stop reason: HOSPADM

## 2019-09-08 RX ORDER — ACETAMINOPHEN 500 MG
1000 TABLET ORAL ONCE
Status: COMPLETED | OUTPATIENT
Start: 2019-09-08 | End: 2019-09-08

## 2019-09-08 RX ORDER — SODIUM CHLORIDE 0.9 % (FLUSH) 0.9 %
5-40 SYRINGE (ML) INJECTION EVERY 8 HOURS
Status: DISCONTINUED | OUTPATIENT
Start: 2019-09-08 | End: 2019-09-21 | Stop reason: HOSPADM

## 2019-09-08 RX ORDER — MESALAMINE 800 MG/1
1600 TABLET, DELAYED RELEASE ORAL 3 TIMES DAILY
Status: DISCONTINUED | OUTPATIENT
Start: 2019-09-09 | End: 2019-09-21 | Stop reason: HOSPADM

## 2019-09-08 RX ORDER — SODIUM CHLORIDE 0.9 % (FLUSH) 0.9 %
5-40 SYRINGE (ML) INJECTION AS NEEDED
Status: DISCONTINUED | OUTPATIENT
Start: 2019-09-08 | End: 2019-09-21 | Stop reason: HOSPADM

## 2019-09-08 RX ORDER — MORPHINE SULFATE 2 MG/ML
4 INJECTION, SOLUTION INTRAMUSCULAR; INTRAVENOUS
Status: COMPLETED | OUTPATIENT
Start: 2019-09-08 | End: 2019-09-08

## 2019-09-08 RX ORDER — ESCITALOPRAM OXALATE 10 MG/1
20 TABLET ORAL DAILY
Status: DISCONTINUED | OUTPATIENT
Start: 2019-09-09 | End: 2019-09-21 | Stop reason: HOSPADM

## 2019-09-08 RX ADMIN — HYDROMORPHONE HYDROCHLORIDE 0.5 MG: 1 INJECTION, SOLUTION INTRAMUSCULAR; INTRAVENOUS; SUBCUTANEOUS at 22:04

## 2019-09-08 RX ADMIN — SODIUM CHLORIDE 100 ML: 900 INJECTION, SOLUTION INTRAVENOUS at 20:06

## 2019-09-08 RX ADMIN — SODIUM CHLORIDE 1000 ML: 900 INJECTION, SOLUTION INTRAVENOUS at 18:47

## 2019-09-08 RX ADMIN — IOPAMIDOL 100 ML: 755 INJECTION, SOLUTION INTRAVENOUS at 20:06

## 2019-09-08 RX ADMIN — Medication 10 ML: at 20:06

## 2019-09-08 RX ADMIN — MORPHINE SULFATE 4 MG: 2 INJECTION, SOLUTION INTRAMUSCULAR; INTRAVENOUS at 18:39

## 2019-09-08 RX ADMIN — ACETAMINOPHEN 1000 MG: 500 TABLET ORAL at 18:39

## 2019-09-08 RX ADMIN — SODIUM CHLORIDE 1000 ML: 900 INJECTION, SOLUTION INTRAVENOUS at 18:39

## 2019-09-08 RX ADMIN — PIPERACILLIN SODIUM,TAZOBACTAM SODIUM 3.38 G: 3; .375 INJECTION, POWDER, FOR SOLUTION INTRAVENOUS at 18:48

## 2019-09-08 NOTE — ED PROVIDER NOTES
26 y.o. female with past medical history significant for Anemia, and Crohn's disease who presents from home via family member with chief complaint of abdominal pain. Pt states she began with abdominal pain, 3 days ago, and developed a fever along with the abdominal pain last evening. Pt states she is nauseated, but has not thrown up, and her abdomin is painful to touch. Pt states she took 1500mg of Tylenol this morning. Pt denies vomiting, diarrhea, back pain, urine dysuria, and urinary changes. Pt still has her apendix. There are no other acute medical concerns at this time. Social hx: Never Smoker, No EtOH use  PCP: Gregg Nunez MD  Gastroenterologist: Param Arriaza. Blessing Oneal M.D. Note written by Jaime Howard, as dictated by Brittani Alvarez MD 6:33 PM             Past Medical History:   Diagnosis Date    Anemia     Cro[facility] disease ([facility] [address]     Crohn's disease ([facility]        History reviewed. No pertinent surgical history.       Family History:   Problem Relation Age of Onset    Thyroid Disease Mother     No Known Problems Father        Social History     Socioeconomic History    Marital status: SINGLE     Spouse name: Not on file    Number of children: Not on file    Years of education: Not on file    Highest education level: Not on file   Occupational History    Not on file   Social Needs    Financial resource strain: Not on file    Food insecurity:     Worry: Not on file     Inability: Not on file    Transportation needs:     Medical: Not on file     Non-medical: Not on file   Tobacco Use    Smoking status: Never Smoker    Smokeless tobacco: Never Used   Substance and Sexual Activity    Alcohol use: No    Drug use: No    Sexual activity: Never   Lifestyle    Physical activity:     Days per week: Not on file     Minutes per session: Not on file    Stress: Not on file   Relationships    Social connections:     Talks on phone: Not on file     Gets together: Not on file     Attends Alevism service: Not on file     Active member of club or organization: Not on file     Attends meetings of clubs or organizations: Not on file     Relationship status: Not on file    Intimate partner violence:     Fear of current or ex partner: Not on file     Emotionally abused: Not on file     Physically abused: Not on file     Forced sexual activity: Not on file   Other Topics Concern    Not on file   Social History Narrative    Not on file         ALLERGIES: Patient has no known allergies. Review of Systems   Constitutional: Positive for fever. Negative for chills. Respiratory: Negative for shortness of breath. Cardiovascular: Negative for chest pain. Gastrointestinal: Positive for abdominal pain and nausea. Negative for constipation, diarrhea and vomiting. Genitourinary: Negative for difficulty urinating, dysuria and flank pain. Musculoskeletal: Negative for back pain. Neurological: Negative for dizziness and light-headedness. All other systems reviewed and are negative. Vitals:    09/08/19 1808 09/08/19 1900   BP: (!) 122/94 127/71   Pulse: (!) 119 (!) 102   Resp: 15 23   Temp: (!) 102 °F (38.9 °C)    SpO2: 99% 100%   Weight: 67.6 kg (149 lb)    Height: 5' 5\" (1.651 m)             Physical Exam   Constitutional: She is oriented to person, place, and time. She appears well-developed and well-nourished. HENT:   Head: Normocephalic and atraumatic. Eyes: Pupils are equal, round, and reactive to light. Neck: Normal range of motion. Neck supple. Cardiovascular: Regular rhythm. Tachycardia present. Pulmonary/Chest: Effort normal and breath sounds normal.   Abdominal: Soft. She exhibits no distension. There is tenderness in the right lower quadrant and left lower quadrant. There is no rebound and no guarding. Neurological: She is alert and oriented to person, place, and time. Skin: Skin is warm and dry. Capillary refill takes less than 2 seconds.    Psychiatric: She has a normal mood and affect. Her behavior is normal.   Nursing note and vitals reviewed. Note written by Jaime Maxwell, as dictated by Andria Arcos MD 6:33 PM    MDM  Number of Diagnoses or Management Options  Intra-abdominal abscess [facility]):   Diagnosis management comments: The patient is resting comfortably and feels better, is alert and in no distress. CT shows intra-abdominal abscess. The history, exam, diagnostic testing, and current condition do not suggest acute appendicitis, bowel obstruction, incarcerated hernia, acute cholecystitis, major gastrointestinal bleeding, or severe diverticulitis. Procedures      ED EKG interpretation:  Rhythm: normal sinus rhythm; and regular . Rate (approx.): 97 bpm; Axis: normal; ST/T wave: normal; No ectopy    Note written by Jaime Maxwell, as dictated by Andria Arcos MD 6:53 PM      CONSULT NOTE:  8:40 PM Andria Arcos MD spoke with Dr. Emy Alvarez, Consult for Gastroenterology. Discussed available diagnostic tests and clinical findings. Dr. Emy Alvarez recommends start with antibiotics. Thinks surgery is only necessary if medication treatment fails. Admit to hospital, and consult with [facility] tomorrow. Patient is being admitted to the hospital.  The results of their tests and reasons for their admission have been discussed with them and/or available family. They convey agreement and understanding for the need to be admitted and for their admission diagnosis. Consultation will be made now with the inpatient physician for hospitalization. Hospitalist Rico for Admission  8:45 PM    ED Room Number: ER05/05  Patient Name and age:  Pacheco Driver 26 y.o.  female  Working Diagnosis:   1.  Intra-abdominal abscess ([address]      Readmission: no  Isolation Requirements:  no  Recommended Level of Care:  med/surg  Code Status:  Full Code  Department:[facility] Adult ED - 21   Other:  Crohn's disease, colorectal aware, GI lizd, Zosyn given

## 2019-09-08 NOTE — ED NOTES
Verbal shift change report given to [address] [facility] (oncoming nurse) by [id number] [facility] (offgoing nurse). Report included the following information SBAR, ED Summary, Intake/Output, MAR and Recent Results.

## 2019-09-08 NOTE — ED TRIAGE NOTES
Pt ambulatory to ED accompanied by boyfriend with c/o abdominal pain, nausea, fever and bloody diarrhea onset 4-5 days ago. Hx of Crohn's and is on Humira every other week. Last taken 8/25/19.

## 2019-09-09 ENCOUNTER — APPOINTMENT (OUTPATIENT)
Dept: CT IMAGING | Age: 24
DRG: 853 | End: 2019-09-09
Attending: INTERNAL MEDICINE
Payer: COMMERCIAL

## 2019-09-09 LAB
ATRIAL RATE: 97 BPM
CALCULATED P AXIS, ECG09: 46 DEGREES
CALCULATED R AXIS, ECG10: 42 DEGREES
CALCULATED T AXIS, ECG11: 23 DEGREES
DIAGNOSIS, 93000: NORMAL
P-R INTERVAL, ECG05: 128 MS
Q-T INTERVAL, ECG07: 302 MS
QRS DURATION, ECG06: 78 MS
QTC CALCULATION (BEZET), ECG08: 383 MS
VENTRICULAR RATE, ECG03: 97 BPM

## 2019-09-09 PROCEDURE — 74011250637 HC RX REV CODE- 250/637: Performed by: INTERNAL MEDICINE

## 2019-09-09 PROCEDURE — 65270000029 HC RM PRIVATE

## 2019-09-09 PROCEDURE — 87506 IADNA-DNA/RNA PROBE TQ 6-11: CPT

## 2019-09-09 PROCEDURE — 74011250636 HC RX REV CODE- 250/636: Performed by: INTERNAL MEDICINE

## 2019-09-09 PROCEDURE — 74011000258 HC RX REV CODE- 258: Performed by: INTERNAL MEDICINE

## 2019-09-09 RX ADMIN — Medication 10 ML: at 02:14

## 2019-09-09 RX ADMIN — Medication 10 ML: at 23:06

## 2019-09-09 RX ADMIN — Medication 10 ML: at 20:21

## 2019-09-09 RX ADMIN — PIPERACILLIN SODIUM,TAZOBACTAM SODIUM 3.38 G: 3; .375 INJECTION, POWDER, FOR SOLUTION INTRAVENOUS at 06:46

## 2019-09-09 RX ADMIN — HYDROMORPHONE HYDROCHLORIDE 0.5 MG: 1 INJECTION, SOLUTION INTRAMUSCULAR; INTRAVENOUS; SUBCUTANEOUS at 06:45

## 2019-09-09 RX ADMIN — Medication 10 ML: at 06:46

## 2019-09-09 RX ADMIN — HYDROMORPHONE HYDROCHLORIDE 0.5 MG: 1 INJECTION, SOLUTION INTRAMUSCULAR; INTRAVENOUS; SUBCUTANEOUS at 20:21

## 2019-09-09 RX ADMIN — MESALAMINE 1600 MG: 800 TABLET, DELAYED RELEASE ORAL at 23:07

## 2019-09-09 RX ADMIN — MESALAMINE 1600 MG: 800 TABLET, DELAYED RELEASE ORAL at 18:48

## 2019-09-09 RX ADMIN — HYDROMORPHONE HYDROCHLORIDE 0.5 MG: 1 INJECTION, SOLUTION INTRAMUSCULAR; INTRAVENOUS; SUBCUTANEOUS at 02:14

## 2019-09-09 RX ADMIN — HYDROMORPHONE HYDROCHLORIDE 0.5 MG: 1 INJECTION, SOLUTION INTRAMUSCULAR; INTRAVENOUS; SUBCUTANEOUS at 15:51

## 2019-09-09 RX ADMIN — PIPERACILLIN SODIUM,TAZOBACTAM SODIUM 3.38 G: 3; .375 INJECTION, POWDER, FOR SOLUTION INTRAVENOUS at 15:57

## 2019-09-09 RX ADMIN — HYDROMORPHONE HYDROCHLORIDE 0.5 MG: 1 INJECTION, SOLUTION INTRAMUSCULAR; INTRAVENOUS; SUBCUTANEOUS at 11:03

## 2019-09-09 RX ADMIN — Medication 10 ML: at 15:42

## 2019-09-09 RX ADMIN — PIPERACILLIN SODIUM,TAZOBACTAM SODIUM 3.38 G: 3; .375 INJECTION, POWDER, FOR SOLUTION INTRAVENOUS at 23:06

## 2019-09-09 NOTE — PROGRESS NOTES
TRANSFER - IN REPORT:    Verbal report received from [facility] (name) on Jared Credit  being received from ED (unit) for routine progression of care      Report consisted of patients Situation, Background, Assessment and   Recommendations(SBAR). Information from the following report(s) SBAR, ED Summary, Intake/Output and MAR was reviewed with the receiving nurse. Opportunity for questions and clarification was provided. Assessment completed upon patients arrival to unit and care assumed.

## 2019-09-09 NOTE — PROGRESS NOTES
Problem: Falls - Risk of  Goal: *Absence of Falls  Description  Document Lucian Gaspar Fall Risk and appropriate interventions in the flowsheet. Outcome: Progressing Towards Goal  Note:   Fall Risk Interventions:     Call bell within patient reach. Side rails up x 2. Bed in lowest position, all wheels locked.  socks when ambulating in room and hallways.

## 2019-09-09 NOTE — ROUTINE PROCESS
TRANSFER - OUT REPORT:    Verbal report given to Mica(name) on Alberto Atkinson  being transferred to (unit) for routine progression of care       Report consisted of patients Situation, Background, Assessment and   Recommendations(SBAR). Information from the following report(s) SBAR, ED Summary and MAR was reviewed with the receiving nurse. Lines:   Peripheral IV 09/08/19 Left Antecubital (Active)   Site Assessment Clean 9/8/2019  7:02 PM   Phlebitis Assessment 0 9/8/2019  7:02 PM   Infiltration Assessment 0 9/8/2019  7:02 PM   Dressing Status Clean, dry, & intact 9/8/2019  7:02 PM   Dressing Type Transparent 9/8/2019  7:02 PM   Hub Color/Line Status Pink 9/8/2019  7:02 PM   Action Taken Blood drawn 9/8/2019  7:02 PM       Peripheral IV 09/08/19 Right Antecubital (Active)   Site Assessment Clean 9/8/2019  7:02 PM   Phlebitis Assessment 0 9/8/2019  7:02 PM   Infiltration Assessment 0 9/8/2019  7:02 PM   Dressing Status Clean, dry, & intact 9/8/2019  7:02 PM   Dressing Type Transparent 9/8/2019  7:02 PM   Hub Color/Line Status Pink 9/8/2019  7:02 PM   Action Taken Blood drawn 9/8/2019  7:02 PM        Opportunity for questions and clarification was provided.       Patient transported with:   Registered Nurse

## 2019-09-09 NOTE — H&P
History and Physical    Patient: Lars Mccollum MRN: [id number]  SSN: [id number]    YOB: 1995  Age: 26 y.o. Sex: female      Subjective:      Lars Mccollum is a 26 y.o. female who states that she was diagnosed with Crohns disease at the age of 17. Patient states that two days ago, she started having bloody diarrhea, that has been ongoing about twice a day. She states that she also started having achy pain continuously in the right lower quadrant, which would sometimes intensify to a sharp transient pain. On the night of September 7, patient felt sweaty and clammy, but did not take her temperature. She did take her temperature in the morning of September 8 and found it to be 101°F. Later in the day, Temperature was even higher at 102.4°F. Patient then decided to come to the hospital. Patient denies any melena. She has been nauseous, but has not had any vomiting. Past Medical History:   Diagnosis Date    Anemia     Crohn's disease ([facility] [address]      Past Surgical History:   Procedure Laterality Date    HX COLONOSCOPY      HX PREMALIG/BENIGN SKIN LESION EXCISION        Family History   Problem Relation Age of Onset    Thyroid Disease Mother         hypothyroidism     Social History     Tobacco Use    Smoking status: Never Smoker    Smokeless tobacco: Never Used   Substance Use Topics    Alcohol use: No    Drug use: No     Prior to Admission medications    Medication Sig Start Date End Date Taking? Authorizing Provider   escitalopram oxalate (LEXAPRO) 20 mg tablet Take 1 Tab by mouth daily. 6/12/19  Yes Eliu Acharya MD   levonorgestrel (MIRENA) 20 mcg/24 hr (5 years) IUD 1 Device by IntraUTERine route once. Yes Provider, Historical   adalimumab (HUMIRA PEN) 40 mg/0.8 mL injection pen by SubCUTAneous route once. Every other week   Yes Provider, Historical   triamcinolone acetonide (KENALOG) 0.5 % ointment Apply  to affected area two (2) times a day.  use thin layer 1/8/19   Marck Eliu LANE MD   omeprazole (PRILOSEC) 40 mg capsule Take 40 mg by mouth daily. 10/6/16   Provider, Historical   ferrous sulfate 325 mg (65 mg iron) tablet Take  by mouth Daily (before breakfast). 10/6/16   Provider, Historical   APRISO 0.375 gram capsule Take 1.5 g by mouth daily. 10/6/16   Provider, Historical        No Known Allergies    Review of Systems:  A comprehensive review of systems was negative except for that written in the History of Present Illness. Objective:     Vitals:    09/08/19 1808 09/08/19 1900 09/08/19 1930   BP: (!) 122/94 127/71    Pulse: (!) 119 (!) 102 90   Resp: 15 23 22   Temp: (!) 102 °F (38.9 °C)     SpO2: 99% 100% 100%   oxygen therapy Room air Room air    Weight: 67.6 kg (149 lb)     Height: 5' 5\" (1.651 m)          Physical Exam:  General:  Alert, cooperative, no distress, appears stated age. Eyes:  Conjunctivae/corneas clear. PERRL, EOMs intact. Mouth/Throat: Lips, mucosa, and tongue normal.    Neck: Supple, symmetrical, trachea midline, no adenopathy,and no JVD. Lungs:   Clear to auscultation bilaterally. Heart:  Regular rate and rhythm, S1, S2 normal, no murmur, click, rub or gallop. Abdomen:   Soft, tender to palpation in right lower quadrant. Bowel sounds normal. Nondistended. Extremities: Extremities normal, atraumatic, no cyanosis or edema. Pulses: 2+ and symmetric all extremities. Skin: Skin color, texture, turgor normal. No rashes or lesions   Lymph nodes: Cervical, supraclavicular, and axillary nodes normal.   Neurologic: CNII-XII intact. Normal strength, sensation and reflexes throughout.          Recent Results (from the past 24 hour(s))   CBC WITH AUTOMATED DIFF    Collection Time: 09/08/19  6:25 PM   Result Value Ref Range    WBC 8.1 3.6 - 11.0 K/uL    RBC 4.72 3.80 - 5.20 M/uL    HGB 12.3 11.5 - 16.0 g/dL    HCT 39.9 35.0 - 47.0 %    MCV 84.5 80.0 - 99.0 FL    MCH 26.1 26.0 - 34.0 PG    MCHC 30.8 30.0 - 36.5 g/dL    RDW 12.8 11.5 - 14.5 % PLATELET 856 502 - 369 K/uL    MPV 10.0 8.9 - 12.9 FL    NRBC 0.0 0  WBC    ABSOLUTE NRBC 0.00 0.00 - 0.01 K/uL    NEUTROPHILS 88 (H) 32 - 75 %    LYMPHOCYTES 3 (L) 12 - 49 %    MONOCYTES 8 5 - 13 %    EOSINOPHILS 0 0 - 7 %    BASOPHILS 0 0 - 1 %    IMMATURE GRANULOCYTES 1 (H) 0.0 - 0.5 %    ABS. NEUTROPHILS 7.2 1.8 - 8.0 K/UL    ABS. LYMPHOCYTES 0.2 (L) 0.8 - 3.5 K/UL    ABS. MONOCYTES 0.6 0.0 - 1.0 K/UL    ABS. EOSINOPHILS 0.0 0.0 - 0.4 K/UL    ABS. BASOPHILS 0.0 0.0 - 0.1 K/UL    ABS. IMM. GRANS. 0.1 (H) 0.00 - 0.04 K/UL    DF SMEAR SCANNED      RBC COMMENTS NORMOCYTIC, NORMOCHROMIC     METABOLIC PANEL, COMPREHENSIVE    Collection Time: 09/08/19  6:25 PM   Result Value Ref Range    Sodium 133 (L) 136 - 145 mmol/L    Potassium 3.6 3.5 - 5.1 mmol/L    Chloride 97 97 - 108 mmol/L    CO2 29 21 - 32 mmol/L    Anion gap 7 5 - 15 mmol/L    Glucose 119 (H) 65 - 100 mg/dL    BUN 6 6 - 20 MG/DL    Creatinine 0.75 0.55 - 1.02 MG/DL    BUN/Creatinine ratio 8 (L) 12 - 20      GFR est AA >60 >60 ml/min/1.73m2    GFR est non-AA >60 >60 ml/min/1.73m2    Calcium 9.1 8.5 - 10.1 MG/DL    Bilirubin, total 0.5 0.2 - 1.0 MG/DL    ALT (SGPT) 16 12 - 78 U/L    AST (SGOT) 12 (L) 15 - 37 U/L    Alk. phosphatase 80 45 - 117 U/L    Protein, total 8.7 (H) 6.4 - 8.2 g/dL    Albumin 3.2 (L) 3.5 - 5.0 g/dL    Globulin 5.5 (H) 2.0 - 4.0 g/dL    A-G Ratio 0.6 (L) 1.1 - 2.2     SAMPLES BEING HELD    Collection Time: 09/08/19  6:25 PM   Result Value Ref Range    SAMPLES BEING HELD 1RED,1BLU     COMMENT        Add-on orders for these samples will be processed based on acceptable specimen integrity and analyte stability, which may vary by analyte. POC LACTIC ACID    Collection Time: 09/08/19  6:30 PM   Result Value Ref Range    Lactic Acid (POC) 0.45 0.40 - 2.00 mmol/L   URINE CULTURE HOLD SAMPLE    Collection Time: 09/08/19  6:38 PM   Result Value Ref Range    Urine culture hold        URINE ON HOLD IN MICROBIOLOGY DEPT FOR 3 DAYS.  IF UNPRESERVED URINE IS SUBMITTED, IT CANNOT BE USED FOR ADDITIONAL TESTING AFTER 24 HRS, RECOLLECTION WILL BE REQUIRED. URINALYSIS W/MICROSCOPIC    Collection Time: 09/08/19  6:38 PM   Result Value Ref Range    Color YELLOW/STRAW      Appearance CLEAR CLEAR      Specific gravity 1.008 1.003 - 1.030      pH (UA) 7.5 5.0 - 8.0      Protein NEGATIVE  NEG mg/dL    Glucose NEGATIVE  NEG mg/dL    Ketone NEGATIVE  NEG mg/dL    Bilirubin NEGATIVE  NEG      Blood NEGATIVE  NEG      Urobilinogen 0.2 0.2 - 1.0 EU/dL    Nitrites NEGATIVE  NEG      Leukocyte Esterase NEGATIVE  NEG      WBC 0-4 0 - 4 /hpf    RBC 0-5 0 - 5 /hpf    Epithelial cells FEW FEW /lpf    Bacteria NEGATIVE  NEG /hpf    Hyaline cast 0-2 0 - 5 /lpf   EKG, 12 LEAD, INITIAL    Collection Time: 09/08/19  6:39 PM   Result Value Ref Range    Ventricular Rate 97 BPM    Atrial Rate 97 BPM    P-R Interval 128 ms    QRS Duration 78 ms    Q-T Interval 302 ms    QTC Calculation (Bezet) 383 ms    Calculated P Axis 46 degrees    Calculated R Axis 42 degrees    Calculated T Axis 23 degrees    Diagnosis Normal sinus rhythm  No previous ECGs available      HCG URINE, QL. - POC    Collection Time: 09/08/19  6:51 PM   Result Value Ref Range    Pregnancy test,urine (POC) NEGATIVE  NEG       CT ABD PELV W CONT  Narrative: EXAM:  CT abdomen pelvis with contrast    INDICATION: Abdominal pain and fever. Inflammatory bowel disease. COMPARISON: None. TECHNIQUE: Helical CT of the abdomen  and pelvis  following the uneventful  intravenous administration of nonionic contrast.  Coronal and sagittal reformats  are performed. CT dose reduction was achieved through use of a standardized  protocol tailored for this examination and automatic exposure control for dose  modulation. Adaptive statistical iterative reconstruction (ASIR) was utilized. FINDINGS:   The visualized lung bases demonstrate no mass or consolidation.  The heart size  is normal. There is no pericardial or pleural effusion. The liver, spleen, pancreas, and adrenal glands are normal. The gall bladder is  present  without intra- or extra-hepatic biliary dilatation. The kidneys are symmetric without hydronephrosis. There is marked inflammation involving small bowel loops in the right lower  abdomen where there is distal ileum thickening and adjacent inflammation as well  as mucosal hyperenhancement. There is a probable enteroenteric fistula between  distal small bowel loops with an intraperitoneal abscess measuring 2.3 x 3.4 cm  (series 601B, image 37). Enhancing but nonenlarged right lower mesenteric lymph  nodes are likely reactive. There are no dilated bowel loops. The appendix is  unremarkable. There are no enlarged lymph nodes. There is no free air. The urinary bladder is normal.  There is no pelvic mass. An IUD is present. The bony structures are age-appropriate. Impression: IMPRESSION:   Marked inflammatory changes involving the distal small bowel loops in the right  lower quadrant in keeping with history of Crohn disease. 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 No bowel obstruction. Normal appendix. CHEST XRAY, as personally reviewed by me, shows no acute cardiopulmonary  process. Assessment:     Hospital Problems  Date Reviewed: 6/12/2019          Codes Class Noted POA    Abscess [facility] abdominal [facility] ([facility] ICD-10-CM: K65.1  ICD-9-CM: 567.22  9/8/2019 Unknown              Plan:     1. Sepsis with intra-abdominal abscess. IV zosyn. Patient may need drainage by interventional radiology - request evaluation. Patient meets sepsis criteria with tachycardia and fever but lactic acid levels are normal.  2. 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 Will request surgery consultation. 3. Crohns disease. Currently on humira and daily mesalamine.       Signed By: Nicolas Terrazas DO September 9, 2019

## 2019-09-09 NOTE — CONSULTS
Colon and Rectal Surgery History and Physical    Subjective:      Chuck Marte is a 26 y.o. female  With a history of CD for for 7-8 years. She has been on steroids, imuran, humira, etc. She is currently on humira and apriso. She has had 2 previous admissions for abscesses related to TI disease. She developed abdominal pain 2 days ago along with fevers. CT showed a  2-3cm abscess and possible fistula between 2 loops of sb. We were called to see her last night as she has seen a colorectal surgeon in [place] in the past. She has never had abdominal surgery in the past however as episodes have always been treated conservatively. She feels a bit better today on steroids and abx. Last TC by Dr. Arturo Templeton a few months ago. Patient Active Problem List    Diagnosis Date Noted    Abscess of abdominal cavity ([facility] [address] 09/08/2019    Psoriasis 06/12/2019    Anxiety 06/12/2019    Crohn disease ([facility] [address] 12/19/2016    Plantar wart of right foot 12/19/2016     Past Medical History:   Diagnosis Date    Anemia     Crohn's disease ([facility] [address]       Past Surgical History:   Procedure Laterality Date    HX COLONOSCOPY      HX PREMALIG/BENIGN SKIN LESION EXCISION        Social History     Tobacco Use    Smoking status: Never Smoker    Smokeless tobacco: Never Used   Substance Use Topics    Alcohol use: No      Family History   Problem Relation Age of Onset    Thyroid Disease Mother         hypothyroidism      Prior to Admission medications    Medication Sig Start Date End Date Taking? Authorizing Provider   escitalopram oxalate (LEXAPRO) 20 mg tablet Take 1 Tab by mouth daily. 6/12/19  Yes Eliu Acharya MD   levonorgestrel (MIRENA) 20 mcg/24 hr (5 years) IUD 1 Device by IntraUTERine route once. Yes Provider, Historical   adalimumab (HUMIRA PEN) 40 mg/0.8 mL injection pen by SubCUTAneous route once.  Every other week   Yes Provider, Historical   triamcinolone acetonide (KENALOG) 0.5 % ointment Apply  to affected area two (2) times a day. use thin layer 1/8/19   Eliu Acharya MD   omeprazole (PRILOSEC) 40 mg capsule Take 40 mg by mouth daily. 10/6/16   Provider, Historical   ferrous sulfate 325 mg (65 mg iron) tablet Take  by mouth Daily (before breakfast). 10/6/16   Provider, Historical   APRISO 0.375 gram capsule Take 1.5 g by mouth daily. 10/6/16   Provider, Historical     No Known Allergies     Review of Systems:    A comprehensive review of systems was negative except for that written in the History of Present Illness. Objective:     Visit Vitals  /71 (BP 1 Location: Left arm, BP Patient Position: At rest)   Pulse 86   Temp 99 °F (37.2 °C)   Resp 19   Ht 5' 5\" (1.651 m)   Wt 68.1 kg (150 lb 1.6 oz)   LMP 07/08/2019   SpO2 95%   Breastfeeding? No   BMI 24.98 kg/m²        Physical Exam:   General:  Alert, cooperative, no distress, appears stated age. Head:  Normocephalic, without obvious abnormality, atraumatic. Eyes:  Conjunctivae/corneas clear. PERRL, EOMs intact. Ears:  Normal external ear canals both ears. Nose: Nares normal. Septum midline. No drainage. Throat: Lips, mucosa, and tongue normal. Teeth and gums normal.   Neck: Supple, symmetrical, trachea midline, no adenopathy   Back:   Symmetric, no curvature. ROM normal.   Lungs:   Clear   Chest wall:  No tenderness or deformity. Heart:  Regular rate and rhythm       Abdomen:   Soft but tender in the RLQ. Genitalia:  deferred       Extremities: Extremities normal, atraumatic, no cyanosis or edema. Skin: Skin color, texture, turgor normal. No rashes or lesions. Neurologic: CNII-XII intact. Normal strength, sensation and reflexes throughout.        Imaging:  images and reports reviewed    Lab Review:    Recent Results (from the past 24 hour(s))   CBC WITH AUTOMATED DIFF    Collection Time: 09/08/19  6:25 PM   Result Value Ref Range    WBC 8.1 3.6 - 11.0 K/uL    RBC 4.72 3.80 - 5.20 M/uL    HGB 12.3 11.5 - 16.0 g/dL    HCT 39.9 35.0 - 47.0 %    MCV 84.5 80.0 - 99.0 FL    MCH 26.1 26.0 - 34.0 PG    MCHC 30.8 30.0 - 36.5 g/dL    RDW 12.8 11.5 - 14.5 %    PLATELET 208 019 - 355 K/uL    MPV 10.0 8.9 - 12.9 FL    NRBC 0.0 0  WBC    ABSOLUTE NRBC 0.00 0.00 - 0.01 K/uL    NEUTROPHILS 88 (H) 32 - 75 %    LYMPHOCYTES 3 (L) 12 - 49 %    MONOCYTES 8 5 - 13 %    EOSINOPHILS 0 0 - 7 %    BASOPHILS 0 0 - 1 %    IMMATURE GRANULOCYTES 1 (H) 0.0 - 0.5 %    ABS. NEUTROPHILS 7.2 1.8 - 8.0 K/UL    ABS. LYMPHOCYTES 0.2 (L) 0.8 - 3.5 K/UL    ABS. MONOCYTES 0.6 0.0 - 1.0 K/UL    ABS. EOSINOPHILS 0.0 0.0 - 0.4 K/UL    ABS. BASOPHILS 0.0 0.0 - 0.1 K/UL    ABS. IMM. GRANS. 0.1 (H) 0.00 - 0.04 K/UL    DF SMEAR SCANNED      RBC COMMENTS NORMOCYTIC, NORMOCHROMIC     METABOLIC PANEL, COMPREHENSIVE    Collection Time: 09/08/19  6:25 PM   Result Value Ref Range    Sodium 133 (L) 136 - 145 mmol/L    Potassium 3.6 3.5 - 5.1 mmol/L    Chloride 97 97 - 108 mmol/L    CO2 29 21 - 32 mmol/L    Anion gap 7 5 - 15 mmol/L    Glucose 119 (H) 65 - 100 mg/dL    BUN 6 6 - 20 MG/DL    Creatinine 0.75 0.55 - 1.02 MG/DL    BUN/Creatinine ratio 8 (L) 12 - 20      GFR est AA >60 >60 ml/min/1.73m2    GFR est non-AA >60 >60 ml/min/1.73m2    Calcium 9.1 8.5 - 10.1 MG/DL    Bilirubin, total 0.5 0.2 - 1.0 MG/DL    ALT (SGPT) 16 12 - 78 U/L    AST (SGOT) 12 (L) 15 - 37 U/L    Alk. phosphatase 80 45 - 117 U/L    Protein, total 8.7 (H) 6.4 - 8.2 g/dL    Albumin 3.2 (L) 3.5 - 5.0 g/dL    Globulin 5.5 (H) 2.0 - 4.0 g/dL    A-G Ratio 0.6 (L) 1.1 - 2.2     SAMPLES BEING HELD    Collection Time: 09/08/19  6:25 PM   Result Value Ref Range    SAMPLES BEING HELD 1RED,1BLU     COMMENT        Add-on orders for these samples will be processed based on acceptable specimen integrity and analyte stability, which may vary by analyte.    CULTURE, BLOOD    Collection Time: 09/08/19  6:25 PM   Result Value Ref Range    Special Requests: NO SPECIAL REQUESTS      Culture result: NO GROWTH AFTER 9 HOURS     CULTURE, BLOOD    Collection Time: 09/08/19  6:25 PM   Result Value Ref Range    Special Requests: NO SPECIAL REQUESTS      Culture result: NO GROWTH AFTER 9 HOURS     POC LACTIC ACID    Collection Time: 09/08/19  6:30 PM   Result Value Ref Range    Lactic Acid (POC) 0.45 0.40 - 2.00 mmol/L   URINE CULTURE HOLD SAMPLE    Collection Time: 09/08/19  6:38 PM   Result Value Ref Range    Urine culture hold        URINE ON HOLD IN MICROBIOLOGY DEPT FOR 3 DAYS. IF UNPRESERVED URINE IS SUBMITTED, IT CANNOT BE USED FOR ADDITIONAL TESTING AFTER 24 HRS, RECOLLECTION WILL BE REQUIRED. URINALYSIS W/MICROSCOPIC    Collection Time: 09/08/19  6:38 PM   Result Value Ref Range    Color YELLOW/STRAW      Appearance CLEAR CLEAR      Specific gravity 1.008 1.003 - 1.030      pH (UA) 7.5 5.0 - 8.0      Protein NEGATIVE  NEG mg/dL    Glucose NEGATIVE  NEG mg/dL    Ketone NEGATIVE  NEG mg/dL    Bilirubin NEGATIVE  NEG      Blood NEGATIVE  NEG      Urobilinogen 0.2 0.2 - 1.0 EU/dL    Nitrites NEGATIVE  NEG      Leukocyte Esterase NEGATIVE  NEG      WBC 0-4 0 - 4 /hpf    RBC 0-5 0 - 5 /hpf    Epithelial cells FEW FEW /lpf    Bacteria NEGATIVE  NEG /hpf    Hyaline cast 0-2 0 - 5 /lpf   EKG, 12 LEAD, INITIAL    Collection Time: 09/08/19  6:39 PM   Result Value Ref Range    Ventricular Rate 97 BPM    Atrial Rate 97 BPM    P-R Interval 128 ms    QRS Duration 78 ms    Q-T Interval 302 ms    QTC Calculation (Bezet) 383 ms    Calculated P Axis 46 degrees    Calculated R Axis 42 degrees    Calculated T Axis 23 degrees    Diagnosis       Normal sinus rhythm  No previous ECGs available  Confirmed by Bennett Mcmillan M.D., [facility] ([id number]) on 9/9/2019 9:39:58 AM     HCG URINE, QL. - POC    Collection Time: 09/08/19  6:51 PM   Result Value Ref Range    Pregnancy test,urine (POC) NEGATIVE  NEG         Labs and radiology: images and reports reviewed      Assessment:     CD with abscess and possible fistula between loops of sb.     Plan:     NPO and IV ABx for now  If no improvement in the next few days then I would recommend starting TPN in anticipation of surgery next week. Will follow along with you.     Signed By: Meribeth Mcardle, MD     September 9, 2019

## 2019-09-09 NOTE — CONSULTS
[facility]  In-patient Consultation        Admit Date: 9/8/2019  Reason for Consultation: intraabdominal abscess in pt w/ Crohn's Dz    HPI:  Angelika Warren is a 26 y.o. female w/ a 7yr hx of Crohn's dz whom we are asked to see in consultation by Dr. Mikhail Bonilla for the above complaint. Pt was admitted via the ED w/ c/o RLQ abd pain that started 3-4 days ago a/w a fever that started 2 nights ago. She is currently on Humira for Crohns. She has had some nausea, no vomiting, diarrhea w/some blood noted. WBC is nl - temp 102 on admission. She is on zosyn. She currently has some RLQ pain but managed w/ analgesia      Patient Active Problem List    Diagnosis Date Noted    Abscess of abdominal cavity ([facility] 09/08/2019    Psoriasis 06/12/2019    Anxiety 06/12/2019    Crohn disease ([facility] [address] 12/19/2016    Plantar wart of right foot 12/19/2016     Past Medical History:   Diagnosis Date    Anemia     Crohn's disease ([facility] [address]       Past Surgical History:   Procedure Laterality Date    HX COLONOSCOPY      HX PREMALIG/BENIGN SKIN LESION EXCISION        Social History     Tobacco Use    Smoking status: Never Smoker    Smokeless tobacco: Never Used   Substance Use Topics    Alcohol use: No      Family History   Problem Relation Age of Onset    Thyroid Disease Mother         hypothyroidism      Prior to Admission medications    Medication Sig Start Date End Date Taking? Authorizing Provider   escitalopram oxalate (LEXAPRO) 20 mg tablet Take 1 Tab by mouth daily. 6/12/19  Yes Eliu Acharya MD   levonorgestrel (MIRENA) 20 mcg/24 hr (5 years) IUD 1 Device by IntraUTERine route once. Yes Provider, Historical   adalimumab (HUMIRA PEN) 40 mg/0.8 mL injection pen by SubCUTAneous route once. Every other week   Yes Provider, Historical   triamcinolone acetonide (KENALOG) 0.5 % ointment Apply  to affected area two (2) times a day.  use thin layer 1/8/19   Eliu Acharya MD   omeprazole (PRILOSEC) 40 mg capsule Take 40 mg by mouth daily. 10/6/16   Provider, Historical   ferrous sulfate 325 mg (65 mg iron) tablet Take  by mouth Daily (before breakfast). 10/6/16   Provider, Historical   APRISO 0.375 gram capsule Take 1.5 g by mouth daily. 10/6/16   Provider, Historical     Current Facility-Administered Medications   Medication Dose Route Frequency    piperacillin-tazobactam (ZOSYN) 3.375 g in 0.9% sodium chloride (MBP/ADV) 100 mL  3.375 g IntraVENous Q8H    sodium chloride (NS) flush 5-10 mL  5-10 mL IntraVENous PRN    sodium chloride (NS) flush 5-40 mL  5-40 mL IntraVENous Q8H    sodium chloride (NS) flush 5-40 mL  5-40 mL IntraVENous PRN    ondansetron (ZOFRAN) injection 4 mg  4 mg IntraVENous Q4H PRN    HYDROmorphone (PF) (DILAUDID) injection 0.5 mg  0.5 mg IntraVENous Q4H PRN    acetaminophen (TYLENOL) solution 650 mg  650 mg Oral Q4H PRN    . PHARMACY TO SUBSTITUTE PER PROTOCOL (Reordered from: APRISO 0.375 gram capsule)    Per Protocol    escitalopram oxalate (LEXAPRO) tablet 20 mg  20 mg Oral DAILY    ferrous sulfate tablet 325 mg  1 Tab Oral DAILY WITH BREAKFAST     No Known Allergies       Subjective:     Review of Systems:    A comprehensive review of systems was negative except for that written in the History of Present Illness. Objective:     Blood pressure 112/71, pulse 86, temperature 99 °F (37.2 °C), resp. rate 19, height 5' 5\" (1.651 m), weight 150 lb 1.6 oz (68.1 kg), last menstrual period 2019, SpO2 95 %, not currently breastfeeding. Temp (24hrs), Av.4 °F (37.4 °C), Min:97.9 °F (36.6 °C), Max:102 °F (38.9 °C)      Recent Labs     19  1825   WBC 8.1   HGB 12.3   HCT 39.9        Recent Labs     19  1825   *   K 3.6   CL 97   CO2 29   *   BUN 6   CREA 0.75   CA 9.1   ALB 3.2*   TBILI 0.5   SGOT 12*   ALT 16     No results for input(s): AML, LPSE in the last 72 hours.       Intake/Output Summary (Last 24 hours) at 2019 0932  Last data filed at 9/8/2019 1944  Gross per 24 hour   Intake 2100 ml   Output    Net 2100 ml       _____________________  Physical Exam:     General:  Alert, cooperative, no distress, appears stated age. Eyes:   Sclera clear. Throat: Lips, mucosa, and tongue normal.   Neck: Supple, symmetrical, trachea midline. Lungs:   Clear to auscultation bilaterally. Heart:  Regular rate and rhythm. Abdomen:   Normal BS, TTP RLQ, Soft,  No masses,  No organomegaly. Extremities: Extremities normal, atraumatic, no cyanosis or edema. Skin: Skin color, texture, turgor normal. No rashes or lesions. Assessment:   Active Problems:    Abscess of abdominal cavity ([facility]) (9/8/2019)            Plan:     Would cont zosyn  It is noted that perc drainage is already ordered  GI following  Dr Sharon Peralta to see  Thank you for allowing us to participate in the care of this patient. Total time spent with patient: 25 minutes. Signed By: Mavis Aguila NP     September 9, 2019            Agree with NP assessment and plan. Colorectal Surgery already involved and will defer to Dr. Augusto Lomeli and sign off.

## 2019-09-09 NOTE — CONSULTS
[address] [facility].   [address] [facility] [address]        GASTROENTEROLOGY CONSULTATION NOTE  Will Ashia Conteh  [phone] office  [phone] NP/PA in-hospital cell phone M-F until 4:30PM  After 5PM or on weekends, please call  for physician on call        NAME:  Anamaria Small   :   1995   MRN:   [id number]       Referring Physician: Dr. Bryanna Villa Date: 2019 9:14 AM    Chief Complaint: abdominal pain     History of Present Illness:  Patient is a 26 y.o. who is seen in consultation at the request of Dr. Dudley Husain for intra-abdominal abscess. Patient has a past medical history significant for Crohn's disease. She presented to the ED with complaints of bloody diarrhea, abdominal pain, and fever. Patient was admitted to the hospital on 19. For review, patient was initially diagnosed with Crohn's disease in . She was previously seen by Dr. Jose Antonio Arellano at [facility] and seen by us in the office for an initial visit on 19. Patient complains of RLQ abdominal pain for the last approximately 3-4 days that she describes as a constant aching sensation with intermittent sharp, stabbing pains. Pain is worse with eating. She reports loose stools (1-2 per day) with bright red blood per rectum for the last few weeks. No melena. There has been associated nausea. No vomiting.  + Fevers (max 102.4). Patient is on Apriso 4 tabs daily and Humira. No NSAID use.  No anticoagulation.  + Alcohol use (once a week).  No tobacco or drug use.  No history of abdominal surgeries. Colonoscopy (7/10/19) by Dr. Ye Aldrich showed a 5 mm ulcer in the ileo-cecal valve, IC valve could not be intubated, multiple biopsies taken; few ulcers in the ascending colon (biopsied); stricture in the cecum (1 cm in diameter and 1 cm long).   Pathology of the cecum showed mild chronic active colitis with ulceration consistent with Crohn's disease, no dysplasia; pathology of the right colon showed ulcer bed only; pathology of the left colon was unremarkable. I have reviewed the emergency room note, hospital admission note, notes by all other clinicians who have seen the patient during this hospitalization to date. I have reviewed the problem list and the reason for this hospitalization. I have reviewed the allergies and the medications the patient was taking at home prior to this hospitalization. PMH:  Past Medical History:   Diagnosis Date    Anemia     Crohn's disease ([address]        PSH:  Past Surgical History:   Procedure Laterality Date    HX COLONOSCOPY      HX PREMALIG/BENIGN SKIN LESION EXCISION         Allergies:  No Known Allergies    Home Medications:  Prior to Admission Medications   Prescriptions Last Dose Informant Patient Reported? Taking? APRISO 0.375 gram capsule 2019  Yes No   Sig: Take 1.5 g by mouth daily. adalimumab (HUMIRA PEN) 40 mg/0.8 mL injection pen 2019 at 1200  Yes Yes   Sig: by SubCUTAneous route once. Every other week   escitalopram oxalate (LEXAPRO) 20 mg tablet 2019 at Unknown time  No Yes   Sig: Take 1 Tab by mouth daily. ferrous sulfate 325 mg (65 mg iron) tablet Not Taking at Unknown time  Yes No   Sig: Take  by mouth Daily (before breakfast). levonorgestrel (MIRENA) 20 mcg/24 hr (5 years) IUD 2019 at Unknown time  Yes Yes   Si Device by IntraUTERine route once. omeprazole (PRILOSEC) 40 mg capsule 2019  Yes No   Sig: Take 40 mg by mouth daily. triamcinolone acetonide (KENALOG) 0.5 % ointment   No No   Sig: Apply  to affected area two (2) times a day.  use thin layer      Facility-Administered Medications: None       Hospital Medications:  Current Facility-Administered Medications   Medication Dose Route Frequency    piperacillin-tazobactam (ZOSYN) 3.375 g in 0.9% sodium chloride (MBP/ADV) 100 mL  3.375 g IntraVENous Q8H    sodium chloride (NS) flush 5-10 mL  5-10 mL IntraVENous PRN    sodium chloride (NS) flush 5-40 mL  5-40 mL IntraVENous Q8H    sodium chloride (NS) flush 5-40 mL  5-40 mL IntraVENous PRN    ondansetron (ZOFRAN) injection 4 mg  4 mg IntraVENous Q4H PRN    HYDROmorphone (PF) (DILAUDID) injection 0.5 mg  0.5 mg IntraVENous Q4H PRN    acetaminophen (TYLENOL) solution 650 mg  650 mg Oral Q4H PRN    . PHARMACY TO SUBSTITUTE PER PROTOCOL (Reordered from: APRISO 0.375 gram capsule)    Per Protocol    escitalopram oxalate (LEXAPRO) tablet 20 mg  20 mg Oral DAILY    ferrous sulfate tablet 325 mg  1 Tab Oral DAILY WITH BREAKFAST       Social History:  Social History     Tobacco Use    Smoking status: Never Smoker    Smokeless tobacco: Never Used   Substance Use Topics    Alcohol use: No       Family History:  Family History   Problem Relation Age of Onset    Thyroid Disease Mother         hypothyroidism       Review of Systems:  Constitutional: + fever, negative chills, negative weight loss  Eyes:   negative visual changes  ENT:   negative sore throat, tongue or lip swelling  Respiratory:  negative cough, negative dyspnea  Cards:  negative for chest pain, palpitations, lower extremity edema  GI:   See HPI  :  negative for frequency, dysuria  Integument:  negative for rash and pruritus  Heme:  negative for easy bruising and gum/nose bleeding  Musculoskeletal:negative for myalgias, back pain and muscle weakness  Neuro:    + for headaches, negative dizziness  Psych: negative for feelings of anxiety, depression     Objective:     Patient Vitals for the past 8 hrs:   BP Temp Pulse Resp SpO2   09/09/19 0746 112/71 99 °F (37.2 °C) 86 19 95 %     No intake/output data recorded. 09/07 1901 - 09/09 0700  In: 2100 [I.V.:2100]  Out: -     EXAM:     CONST:  Pleasant female lying in bed, no acute distress   NEURO:  Alert and oriented x 3   HEENT: EOMI, no scleral icterus   LUNGS: CTA bilaterally anteriorly   CARD:  S1 S2   ABD:  Soft, non distended, RLQ tenderness, no rebound, no guarding. + Bowel sounds.    EXT:  Warm   PSYCH: Not anxious or agitated     Data Review     Recent Labs     09/08/19  1825   WBC 8.1   HGB 12.3   HCT 39.9        Recent Labs     09/08/19  1825   *   K 3.6   CL 97   CO2 29   BUN 6   CREA 0.75   *   CA 9.1     Recent Labs     09/08/19  1825   SGOT 12*   AP 80   TP 8.7*   ALB 3.2*   GLOB 5.5*     No results for input(s): INR, PTP, APTT in the last 72 hours. No lab exists for component: INREXT      9/8/19  EXAM:  CT abdomen pelvis with contrast     INDICATION: Abdominal pain and fever. Inflammatory bowel disease.     COMPARISON: None.     TECHNIQUE: Helical CT of the abdomen  and pelvis  following the uneventful  intravenous administration of nonionic contrast.  Coronal and sagittal reformats  are performed. CT dose reduction was achieved through use of a standardized  protocol tailored for this examination and automatic exposure control for dose  modulation. Adaptive statistical iterative reconstruction (ASIR) was utilized.     FINDINGS:   The visualized lung bases demonstrate no mass or consolidation. The heart size  is normal. There is no pericardial or pleural effusion.     The liver, spleen, pancreas, and adrenal glands are normal. The gall bladder is  present  without intra- or extra-hepatic biliary dilatation.       The kidneys are symmetric without hydronephrosis.      There is marked inflammation involving small bowel loops in the right lower  abdomen where there is distal ileum thickening and adjacent inflammation as well  as mucosal hyperenhancement. There is a probable enteroenteric fistula between  distal small bowel loops with an intraperitoneal abscess measuring 2.3 x 3.4 cm  (series 601B, image 37). Enhancing but nonenlarged right lower mesenteric lymph  nodes are likely reactive. There are no dilated bowel loops. The appendix is  unremarkable.     There are no enlarged lymph nodes. There is no free air.     The urinary bladder is normal.  There is no pelvic mass.  An IUD is present.     The bony structures are age-appropriate.     IMPRESSION  IMPRESSION:   Marked inflammatory changes involving the distal small bowel loops in the right  lower quadrant in keeping with history of Crohn disease. 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 No bowel obstruction. Normal appendix. Assessment:   · RLQ abdominal pain: WBC 8.1, Hgb 12.3, LFTs normal. CT abdomen/pelvis with IV contrast (9/8/19): marked inflammatory changes involving the distal small bowel loops in the right lower quadrant in keeping with history of Crohn's disease; probable enteroenteric fistula between distal small bowel loops with an intraperitoneal abscess measuring 2.3 x 3.4 cm; no bowel obstruction. · Crohn's disease: on Humira and Apriso  · Intra-abdominal abscess   · Abdominal enteroenteric fistula      Patient Active Problem List   Diagnosis Code    Crohn disease ([facility] K50.90    Plantar wart of right foot B07.0    Psoriasis L40.9    Anxiety F41.9    Abscess of abdominal cavity ([facility] K65.1     Plan:     · Check stool studies  · On antibiotics  · Supportive measures  · Colorectal surgery and IR consulted  · Patient was discussed with and will be seen by Dr. Shaheen Rangel  · Thank you for allowing me to participate in care of [facility]     Signed By: Omar Bautista     9/9/2019  9:14 AM       Gastroenterology Attending Physician attestation statement and comments. This patient was seen and examined by me in a face-to-face visit today. I reviewed the medical record including lab work, imaging and other provider notes. I confirmed the history as described above. I spoke to the patient, reviewed the medical record including lab work, imaging and other provider notes. I discussed this case in detail with Omar Ortiz. I formulated an  assessment of this patient and developed a treatment plan.       I agree with the above consultation note. I agree with the history, exam and assessment and plan as outlined in the note. I would like to add the following:   She presents with a two week history of evolving abdominal pain, which progressed to bloody diarrhea and fever. CT findings are consistent with abscess and likely an entero-enteric fistula. She has been compliant with Humira and Apriso. Her last dose of Humira was two weeks ago and she would have otherwise been due at this time. In the setting of her abscess and suspected fistula, I have advised that we hold her Humira at this time. I have discussed her case with Dr. Nedra Eden and we agreed to continue IV antibiotics, NPO status, and plan for repeat imaging in the near future. Based on her clinical course, we can decide on the role of radiological drainage and possibly surgery. Appreciate Surgery and colorectal Surgery's assistance. Brennon Villanueva MD

## 2019-09-09 NOTE — PROGRESS NOTES
Reason for Admission:   Patient admitted due to bloody diarrhea. Patient is alert and oriented. Friend was at bedside during the assessment. Patient is young, independent with ADL's and IADL's. Uses no assisting devices. No home oxygen uses room air. Patient drives her private vehicle to and from. Family will transport at discharge. Patient confirmed PCP is Dr. Emili Jacobo and uses [facility] on [employer]. RRAT Score:      3               Plan for utilizing home health:      No plan for home health at this time. Current Advanced Directive/Advance Care Plan:  Not on file. Care Management Interventions  PCP Verified by CM: Yes  Current Support Network: Other(Lives with her mother)  Confirm Follow Up Transport: Self(Patient drives private vehicle)  Discharge Location  Discharge Placement: Home                           Transition of Care Plan:        1. CM will monitor for care management needs. 2. Family will transport at discharge.      CM will follow  FAWAD Olivera/FRANCESCA

## 2019-09-09 NOTE — PROGRESS NOTES
Hospitalist Progress Note  Saul Tomlin MD  Answering service: [phone] OR [phone] from in house phone        Date of Service:  2019  NAME:  Daniela Gonzalez  :  1995  MRN:  [id number]  PCP: Elenita Montiel MD    Chief Complaint:   Chief Complaint   Patient presents with    Inflammatory Bowel Disease    Abdominal Pain    Fever    Melena         Admission Summary:     Daniela Gonzalez is a 26 y.o. female who states that she was diagnosed with Crohns disease at the age of 17. Patient states that two days ago, she started having bloody diarrhea, that has been ongoing about twice a day. She states that she also started having achy pain continuously in the right lower quadrant, which would sometimes intensify to a sharp transient pain. On the night of , patient felt sweaty and clammy, but did not take her temperature. She did take her temperature in the morning of  and found it to be 101°F. Later in the day, Temperature was even higher at 102.4°F. Patient then decided to come to the hospital. Patient denies any melena. She has been nauseous, but has not had any vomiting. CT abd:   Marked inflammatory changes involving the distal small bowel loops in the right  lower quadrant in keeping with history of Crohn disease. 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 No bowel obstruction. Normal appendix. Interval history / Subjective:     Patient reported right sided lower quadrant pain. No nausea and vomiting. Assessment & Plan:     1. Sepsis with intra-abdominal abscess. on IV zosyn. Patient meets sepsis criteria with tachycardia and fever but lactic acid levels are normal.  IR consulted for abscess drainage but it was not done due ot possibility of adhesion  Surgery on board. 2. 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 Surgery consulted.  Will appreciate recommendations   Patient does not appear to have any bowel obstruction     3. Crohns disease. Currently on humira  GI following    Code status: Full Code    DVT prophylaxis: mechanical prophylaxis    Care Plan discussed with: Patient/Family    Disposition: Home w/Family and TBD    Hospital Problems  Date Reviewed: 6/12/2019          Codes Class Noted POA    Abscess of abdominal cavity [facility]) ICD-10-CM: K65.1  ICD-9-CM: 567.22  9/8/2019 Unknown                Review of Systems:   A comprehensive review of systems was negative except for that written in the HPI. Physical Examination:     Visit Vitals  /71 (BP 1 Location: Left arm, BP Patient Position: At rest)   Pulse 91   Temp 98.7 °F (37.1 °C)   Resp 17   Ht 5' 5\" (1.651 m)   Wt 68.1 kg (150 lb 1.6 oz)   LMP 07/08/2019   SpO2 98%   Breastfeeding? No   BMI 24.98 kg/m²     General:  Alert, cooperative, no distress, appears stated age. Head:  Normocephalic, without obvious abnormality, atraumatic. Lungs:   Clear to auscultation bilaterally. Chest wall:  No tenderness or deformity. Heart:  Regular rate and rhythm, S1, S2 normal, no murmur, click, rub or gallop. Abdomen:   Right lower quadrant tenderness. Bowel sounds normal. No masses,  No organomegaly. Extremities: Extremities normal, atraumatic, no cyanosis or edema. Pulses: 2+ and symmetric all extremities. Lymph nodes: Cervical, supraclavicular, and axillary nodes normal.   Neurologic: CNII-XII intact. Normal strength, sensation and r          Vital Signs:    Last 24hrs VS reviewed since prior progress note. Most recent are:    Visit Vitals  /71 (BP 1 Location: Left arm, BP Patient Position: At rest)   Pulse 91   Temp 98.7 °F (37.1 °C)   Resp 17   Ht 5' 5\" (1.651 m)   Wt 68.1 kg (150 lb 1.6 oz)   SpO2 98%   Breastfeeding?  No   BMI 24.98 kg/m²         Intake/Output Summary (Last 24 hours) at 9/9/2019 1838  Last data filed at 9/8/2019 1944  Gross per 24 hour   Intake 2100 ml   Output    Net 2100 ml        Tmax:  Temp (24hrs), Av.6 °F (37 °C), Min:97.9 °F (36.6 °C), Max:99 °F (37.2 °C)      Data Review:   Data reviewed by myself:  Ct Abd Pelv W Cont    Result Date: 2019  EXAM:  CT abdomen pelvis with contrast INDICATION: Abdominal pain and fever. Inflammatory bowel disease. COMPARISON: None. TECHNIQUE: Helical CT of the abdomen  and pelvis  following the uneventful intravenous administration of nonionic contrast.  Coronal and sagittal reformats are performed. CT dose reduction was achieved through use of a standardized protocol tailored for this examination and automatic exposure control for dose modulation. Adaptive statistical iterative reconstruction (ASIR) was utilized. FINDINGS: The visualized lung bases demonstrate no mass or consolidation. The heart size is normal. There is no pericardial or pleural effusion. The liver, spleen, pancreas, and adrenal glands are normal. The gall bladder is present  without intra- or extra-hepatic biliary dilatation. The kidneys are symmetric without hydronephrosis. There is marked inflammation involving small bowel loops in the right lower abdomen where there is distal ileum thickening and adjacent inflammation as well as mucosal hyperenhancement. 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 Enhancing but nonenlarged right lower mesenteric lymph nodes are likely reactive. There are no dilated bowel loops. The appendix is unremarkable. There are no enlarged lymph nodes. There is no free air. The urinary bladder is normal.  There is no pelvic mass. An IUD is present. The bony structures are age-appropriate. IMPRESSION: Marked inflammatory changes involving the distal small bowel loops in the right lower quadrant in keeping with history of Crohn disease.  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 No bowel obstruction. Normal appendix. Xr Chest Port    Result Date: 9/8/2019  EXAM:  CR chest portable INDICATION: Fever. Abdominal pain. COMPARISON: None. TECHNIQUE: Portable AP upright chest view at 1956 hours FINDINGS: Cardiac monitoring wires overlie the thorax. The cardiomediastinal contours are within normal limits. The lungs and pleural spaces are clear. There is no pneumothorax. The bones and upper abdomen are unremarkable. IMPRESSION: No acute process. No results found for: SDES  Lab Results   Component Value Date/Time    Culture result: NO GROWTH AFTER 9 HOURS 09/08/2019 06:25 PM    Culture result: NO GROWTH AFTER 9 HOURS 09/08/2019 06:25 PM     All Micro Results     Procedure Component Value Units Date/Time    C. DIFFICILE AG & TOXIN A/B [[id number]]     Order Status:  Sent Specimen:  Stool     CULTURE, STOOL [[id number]]     Order Status:  Sent Specimen:  Stool     OVA & PARASITES, STOOL [[id number]]     Order Status:  Sent Specimen:  Stool     CULTURE, BLOOD [[id number]] Collected:  09/08/19 1825    Order Status:  Completed Specimen:  Blood Updated:  09/09/19 0507     Special Requests: NO SPECIAL REQUESTS        Culture result: NO GROWTH AFTER 9 HOURS       CULTURE, BLOOD [[id number]] Collected:  09/08/19 1825    Order Status:  Completed Specimen:  Blood Updated:  09/09/19 0507     Special Requests: NO SPECIAL REQUESTS        Culture result: NO GROWTH AFTER 9 HOURS       URINE CULTURE HOLD SAMPLE [[id number]] Collected:  09/08/19 1838    Order Status:  Completed Specimen:  Urine from Serum Updated:  09/08/19 1846     Urine culture hold       URINE ON HOLD IN MICROBIOLOGY DEPT FOR 3 DAYS. IF UNPRESERVED URINE IS SUBMITTED, IT CANNOT BE USED FOR ADDITIONAL TESTING AFTER 24 HRS, RECOLLECTION WILL BE REQUIRED. Labs: reviewed by myself.      Recent Labs     09/08/19 1825   WBC 8.1   HGB 12.3   HCT 39.9        Recent Labs     09/08/19  1825   *   K 3.6   CL 97   CO2 29   BUN 6   CREA 0.75   *   CA 9.1     Recent Labs     09/08/19  1825   SGOT 12*   ALT 16   AP 80   TBILI 0.5   TP 8.7*   ALB 3.2*   GLOB 5.5*     No results for input(s): INR, PTP, APTT in the last 72 hours. No lab exists for component: INREXT   No results for input(s): FE, TIBC, PSAT, FERR in the last 72 hours. No results found for: FOL, RBCF   No results for input(s): PH, PCO2, PO2 in the last 72 hours. No results for input(s): CPK, CKNDX, TROIQ in the last 72 hours.     No lab exists for component: CPKMB  No results found for: CHOL, CHOLX, CHLST, CHOLV, HDL, LDL, LDLC, DLDLP, TGLX, TRIGL, TRIGP, CHHD, CHHDX  No results found for: Robbie Lyon  Lab Results   Component Value Date/Time    Color YELLOW/STRAW 09/08/2019 06:38 PM    Appearance CLEAR 09/08/2019 06:38 PM    Specific gravity 1.008 09/08/2019 06:38 PM    pH (UA) 7.5 09/08/2019 06:38 PM    Protein NEGATIVE  09/08/2019 06:38 PM    Glucose NEGATIVE  09/08/2019 06:38 PM    Ketone NEGATIVE  09/08/2019 06:38 PM    Bilirubin NEGATIVE  09/08/2019 06:38 PM    Urobilinogen 0.2 09/08/2019 06:38 PM    Nitrites NEGATIVE  09/08/2019 06:38 PM    Leukocyte Esterase NEGATIVE  09/08/2019 06:38 PM    Epithelial cells FEW 09/08/2019 06:38 PM    Bacteria NEGATIVE  09/08/2019 06:38 PM    WBC 0-4 09/08/2019 06:38 PM    RBC 0-5 09/08/2019 06:38 PM         Medications Reviewed:     Current Facility-Administered Medications   Medication Dose Route Frequency    piperacillin-tazobactam (ZOSYN) 3.375 g in 0.9% sodium chloride (MBP/ADV) 100 mL  3.375 g IntraVENous Q8H    sodium chloride (NS) flush 5-10 mL  5-10 mL IntraVENous PRN    sodium chloride (NS) flush 5-40 mL  5-40 mL IntraVENous Q8H    sodium chloride (NS) flush 5-40 mL  5-40 mL IntraVENous PRN    ondansetron (ZOFRAN) injection 4 mg  4 mg IntraVENous Q4H PRN    HYDROmorphone (PF) (DILAUDID) injection 0.5 mg  0.5 mg IntraVENous Q4H PRN    acetaminophen (TYLENOL) solution 650 mg  650 mg Oral Q4H PRN    mesalamine DR (ASACOL HD) tablet 1,600 mg  1,600 mg Oral TID    escitalopram oxalate (LEXAPRO) tablet 20 mg  20 mg Oral DAILY    ferrous sulfate tablet 325 mg  1 Tab Oral DAILY WITH BREAKFAST     ______________________________________________________________________  EXPECTED LENGTH OF STAY: - - -  ACTUAL LENGTH OF STAY:          1                 Saul Tomlin MD     Patient's emergency contacts:  Extended Emergency Contact Information  Primary Emergency Contact: Casperövaiman  Phone: [phone]  Mobile Phone: [phone]  Relation: Mother

## 2019-09-10 PROCEDURE — 74011000258 HC RX REV CODE- 258: Performed by: INTERNAL MEDICINE

## 2019-09-10 PROCEDURE — 65270000029 HC RM PRIVATE

## 2019-09-10 PROCEDURE — 87449 NOS EACH ORGANISM AG IA: CPT

## 2019-09-10 PROCEDURE — 74011250637 HC RX REV CODE- 250/637: Performed by: INTERNAL MEDICINE

## 2019-09-10 PROCEDURE — 74011250636 HC RX REV CODE- 250/636: Performed by: INTERNAL MEDICINE

## 2019-09-10 PROCEDURE — 87177 OVA AND PARASITES SMEARS: CPT

## 2019-09-10 RX ADMIN — HYDROMORPHONE HYDROCHLORIDE 0.5 MG: 1 INJECTION, SOLUTION INTRAMUSCULAR; INTRAVENOUS; SUBCUTANEOUS at 09:41

## 2019-09-10 RX ADMIN — MESALAMINE 1600 MG: 800 TABLET, DELAYED RELEASE ORAL at 22:54

## 2019-09-10 RX ADMIN — HYDROMORPHONE HYDROCHLORIDE 0.5 MG: 1 INJECTION, SOLUTION INTRAMUSCULAR; INTRAVENOUS; SUBCUTANEOUS at 23:04

## 2019-09-10 RX ADMIN — Medication 10 ML: at 22:53

## 2019-09-10 RX ADMIN — MESALAMINE 1600 MG: 800 TABLET, DELAYED RELEASE ORAL at 09:02

## 2019-09-10 RX ADMIN — MESALAMINE 1600 MG: 800 TABLET, DELAYED RELEASE ORAL at 18:00

## 2019-09-10 RX ADMIN — Medication 10 ML: at 14:39

## 2019-09-10 RX ADMIN — PIPERACILLIN SODIUM,TAZOBACTAM SODIUM 3.38 G: 3; .375 INJECTION, POWDER, FOR SOLUTION INTRAVENOUS at 06:51

## 2019-09-10 RX ADMIN — FERROUS SULFATE TAB 325 MG (65 MG ELEMENTAL FE) 325 MG: 325 (65 FE) TAB at 09:02

## 2019-09-10 RX ADMIN — Medication 10 ML: at 05:31

## 2019-09-10 RX ADMIN — PIPERACILLIN SODIUM,TAZOBACTAM SODIUM 3.38 G: 3; .375 INJECTION, POWDER, FOR SOLUTION INTRAVENOUS at 22:52

## 2019-09-10 RX ADMIN — HYDROMORPHONE HYDROCHLORIDE 0.5 MG: 1 INJECTION, SOLUTION INTRAMUSCULAR; INTRAVENOUS; SUBCUTANEOUS at 05:30

## 2019-09-10 RX ADMIN — ESCITALOPRAM OXALATE 20 MG: 10 TABLET ORAL at 09:02

## 2019-09-10 RX ADMIN — PIPERACILLIN SODIUM,TAZOBACTAM SODIUM 3.38 G: 3; .375 INJECTION, POWDER, FOR SOLUTION INTRAVENOUS at 14:39

## 2019-09-10 RX ADMIN — HYDROMORPHONE HYDROCHLORIDE 0.5 MG: 1 INJECTION, SOLUTION INTRAMUSCULAR; INTRAVENOUS; SUBCUTANEOUS at 00:41

## 2019-09-10 RX ADMIN — HYDROMORPHONE HYDROCHLORIDE 0.5 MG: 1 INJECTION, SOLUTION INTRAMUSCULAR; INTRAVENOUS; SUBCUTANEOUS at 19:13

## 2019-09-10 RX ADMIN — HYDROMORPHONE HYDROCHLORIDE 0.5 MG: 1 INJECTION, SOLUTION INTRAMUSCULAR; INTRAVENOUS; SUBCUTANEOUS at 14:38

## 2019-09-10 NOTE — PROGRESS NOTES
[address] [facility] [address]  [address] [facility], [address]       GI PROGRESS NOTE  Sarahy Robin, [facility] office  [phone] NP in-hospital cell phone M-F until 4:30  After 5pm or on weekends, please call  for physician on call      NAME: Anamaria Small   :  1995   MRN:  [id number]       Subjective:   She reports some improvement - still having lower abdominal pain R>L and some nausea. She has not had any bowel movements since admission. Objective:     VITALS:   Last 24hrs VS reviewed since prior progress note. Most recent are:  Visit Vitals  /73 (BP Patient Position: At rest)   Pulse 70   Temp 97.7 °F (36.5 °C)   Resp 18   Ht 5' 5\" (1.651 m)   Wt 62.9 kg (138 lb 11.2 oz)   SpO2 99%   Breastfeeding? No   BMI 23.08 kg/m²       PHYSICAL EXAM:  General: Cooperative, no acute distress    Neurologic:  Alert and oriented X 3. HEENT: EOMI, no scleral icterus   Lungs:  CTA bilaterally. No wheezing  Heart:  S1 S2, regular rhythm, no murmur   Abdomen: Soft, non-distended, lower tenderness. +Bowel sounds  Extremities: No edema  Psych:   Good insight. Not anxious or agitated. Lab Data Reviewed:     No results found for this or any previous visit (from the past 24 hour(s)). Assessment:   · Crohn's disease: on Humira and apriso; CT with abscess and likely entero-enteric fistula   · Intra-abdominal abscess   · Abdominal enteroenteric fistula      Patient Active Problem List   Diagnosis Code    Crohn disease ([facility] [address] K50.90    Plantar wart of right foot B07.0    Psoriasis L40.9    Anxiety F41.9    Abscess of abdominal cavity ([facility] [address] K65.1     Plan:   · Stool studies if able  · Continue antibiotics  · Plan for repeat CT in the next few days  · CRS following     Signed By: Daija Scott NP     9/10/2019  9:37 AM     GI Attending: Agree with above. Continue antibiotics. Plan for CT in the next couple days based on clinical course. Brennon Aldrich MD

## 2019-09-10 NOTE — PROGRESS NOTES
Hospitalist Progress Note  Laurent Lino MD  Answering service: [phone] from in house phone        Date of Service:  9/10/2019  NAME:  Lars Mccollum  :  1995  MRN:  [id number]  PCP: Candida Matos MD    Chief Complaint:   Chief Complaint   Patient presents with    Inflammatory Bowel Disease    Abdominal Pain    Fever    Melena         Admission Summary:     Lars Mccollum is a 26 y.o. female who states that she was diagnosed with Crohns disease at the age of 17. Patient states that two days ago, she started having bloody diarrhea, that has been ongoing about twice a day. She states that she also started having achy pain continuously in the right lower quadrant, which would sometimes intensify to a sharp transient pain. On the night of , patient felt sweaty and clammy, but did not take her temperature. She did take her temperature in the morning of  and found it to be 101°F. Later in the day, Temperature was even higher at 102.4°F. Patient then decided to come to the hospital. Patient denies any melena. She has been nauseous, but has not had any vomiting. CT abd:   Marked inflammatory changes involving the distal small bowel loops in the right  lower quadrant in keeping with history of Crohn disease. 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 No bowel obstruction. Normal appendix. Interval history / Subjective:     Patient is seen and examined at bedside. Family present. She feels better. Discussed with colorectal surgery Dr. Manoj Orr and advanced diet to regular. Assessment & Plan:     Sepsis with intra-abdominal abscess - improving   Secondary to Crohn's disease    - CT abd: Marked inflammatory changes involving the distal small bowel loops in the right lower quadrant in keeping with history of Crohn disease.  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   -  Patient meets sepsis criteria with tachycardia and fever on poa but lactic acid levels are normal.  - IR consulted for abscess drainage but it was not done due ot possibility of adhesion  -  Colorectal Surgery on board. Diet advanced   - plan for CT in next few days per GI and surgery   - ID consulted placed for abx   -  c/w IV zosyn. 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 Crohns disease.   - Currently on humira   - c/w asacol   - GI following    Depression- lexapro     Code status: Full Code  DVT prophylaxis: mechanical prophylaxis  Care Plan discussed with: Patient/Family  Disposition: Home w/Family and TBD    Hospital Problems  Date Reviewed: 6/12/2019          Codes Class Noted POA    * (Principal) Abscess of abdominal cavity ([facility] [address] ICD-10-CM: K65.1  ICD-9-CM: 567.22  9/8/2019 Yes        Crohn disease ([facility] [address] ICD-10-CM: K50.90  ICD-9-CM: 555.9  12/19/2016 Yes                Review of Systems:   A comprehensive review of systems was negative except for that written in the HPI. Physical Examination:     Visit Vitals  /64 (BP Patient Position: At rest)   Pulse 82   Temp 98 °F (36.7 °C)   Resp 18   Ht 5' 5\" (1.651 m)   Wt 62.9 kg (138 lb 11.2 oz)   SpO2 98%   Breastfeeding? No   BMI 23.08 kg/m²     General:  Alert, cooperative, no distress, appears stated age. Head:  Normocephalic, without obvious abnormality, atraumatic. Lungs:   Clear to auscultation bilaterally. Chest wall:  No tenderness or deformity. Heart:  Regular rate and rhythm, S1, S2 normal, no murmur, click, rub or gallop. Abdomen:   Right lower quadrant tenderness. Bowel sounds normal. No masses,  No organomegaly. Extremities: Extremities normal, atraumatic, no cyanosis or edema. Pulses: 2+ and symmetric all extremities.    Lymph nodes: Cervical, supraclavicular, and axillary nodes normal.   Neurologic: CNII-XII intact. Normal strength, sensation and r          Vital Signs:    Last 24hrs VS reviewed since prior progress note. Most recent are:    Visit Vitals  /64 (BP Patient Position: At rest)   Pulse 82   Temp 98 °F (36.7 °C)   Resp 18   Ht 5' 5\" (1.651 m)   Wt 62.9 kg (138 lb 11.2 oz)   SpO2 98%   Breastfeeding? No   BMI 23.08 kg/m²       No intake or output data in the 24 hours ending 09/10/19 1914     Tmax:  Temp (24hrs), Av.3 °F (36.8 °C), Min:97.6 °F (36.4 °C), Max:99.8 °F (37.7 °C)      Data Review:   Data reviewed by myself:  Ct Abd Pelv W Cont    Result Date: 2019  EXAM:  CT abdomen pelvis with contrast INDICATION: Abdominal pain and fever. Inflammatory bowel disease. COMPARISON: None. TECHNIQUE: Helical CT of the abdomen  and pelvis  following the uneventful intravenous administration of nonionic contrast.  Coronal and sagittal reformats are performed. CT dose reduction was achieved through use of a standardized protocol tailored for this examination and automatic exposure control for dose modulation. Adaptive statistical iterative reconstruction (ASIR) was utilized. FINDINGS: The visualized lung bases demonstrate no mass or consolidation. The heart size is normal. There is no pericardial or pleural effusion. The liver, spleen, pancreas, and adrenal glands are normal. The gall bladder is present  without intra- or extra-hepatic biliary dilatation. The kidneys are symmetric without hydronephrosis. There is marked inflammation involving small bowel loops in the right lower abdomen where there is distal ileum thickening and adjacent inflammation as well as mucosal hyperenhancement. 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 Enhancing but nonenlarged right lower mesenteric lymph nodes are likely reactive. There are no dilated bowel loops. The appendix is unremarkable.  There are no enlarged lymph nodes. There is no free air. The urinary bladder is normal.  There is no pelvic mass. An IUD is present. The bony structures are age-appropriate. IMPRESSION: Marked inflammatory changes involving the distal small bowel loops in the right lower quadrant in keeping with history of Crohn disease. 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 No bowel obstruction. Normal appendix. Xr Chest Port    Result Date: 9/8/2019  EXAM:  CR chest portable INDICATION: Fever. Abdominal pain. COMPARISON: None. TECHNIQUE: Portable AP upright chest view at 1956 hours FINDINGS: Cardiac monitoring wires overlie the thorax. The cardiomediastinal contours are within normal limits. The lungs and pleural spaces are clear. There is no pneumothorax. The bones and upper abdomen are unremarkable. IMPRESSION: No acute process.      No results found for: SDES  Lab Results   Component Value Date/Time    Culture result: NO GROWTH 2 DAYS 09/08/2019 06:25 PM    Culture result: NO GROWTH 2 DAYS 09/08/2019 06:25 PM     All Micro Results     Procedure Component Value Units Date/Time    CULTURE, STOOL [[id number]] Collected:  09/09/19 1114    Order Status:  Completed Specimen:  Stool Updated:  09/10/19 1146    OVA & PARASITES, STOOL [[id number]] Collected:  09/10/19 1114    Order Status:  Completed Specimen:  Stool Updated:  09/10/19 1145    C. DIFFICILE AG & TOXIN A/B [[id number]] Collected:  09/10/19 1114    Order Status:  Completed Specimen:  Stool Updated:  09/10/19 1144    CULTURE, BLOOD [[id number]] Collected:  09/08/19 1825    Order Status:  Completed Specimen:  Blood Updated:  09/10/19 0818     Special Requests: NO SPECIAL REQUESTS        Culture result: NO GROWTH 2 DAYS       CULTURE, BLOOD [[id number]] Collected:  09/08/19 1825    Order Status:  Completed Specimen:  Blood Updated:  09/10/19 0818     Special Requests: NO SPECIAL REQUESTS Culture result: NO GROWTH 2 DAYS       URINE CULTURE HOLD SAMPLE [[id number]] Collected:  09/08/19 1838    Order Status:  Completed Specimen:  Urine from Serum Updated:  09/08/19 1846     Urine culture hold       URINE ON HOLD IN MICROBIOLOGY DEPT FOR 3 DAYS. IF UNPRESERVED URINE IS SUBMITTED, IT CANNOT BE USED FOR ADDITIONAL TESTING AFTER 24 HRS, RECOLLECTION WILL BE REQUIRED. Labs: reviewed by myself. Recent Labs     09/08/19 1825   WBC 8.1   HGB 12.3   HCT 39.9        Recent Labs     09/08/19 1825   *   K 3.6   CL 97   CO2 29   BUN 6   CREA 0.75   *   CA 9.1     Recent Labs     09/08/19 1825   SGOT 12*   ALT 16   AP 80   TBILI 0.5   TP 8.7*   ALB 3.2*   GLOB 5.5*     No results for input(s): INR, PTP, APTT in the last 72 hours. No lab exists for component: INREXT, INREXT   No results for input(s): FE, TIBC, PSAT, FERR in the last 72 hours. No results found for: FOL, RBCF   No results for input(s): PH, PCO2, PO2 in the last 72 hours. No results for input(s): CPK, CKNDX, TROIQ in the last 72 hours.     No lab exists for component: CPKMB  No results found for: CHOL, CHOLX, CHLST, CHOLV, HDL, HDLP, LDL, LDLC, DLDLP, TGLX, TRIGL, TRIGP, CHHD, CHHDX  No results found for: [facility]  Lab Results   Component Value Date/Time    Color YELLOW/STRAW 09/08/2019 06:38 PM    Appearance CLEAR 09/08/2019 06:38 PM    Specific gravity 1.008 09/08/2019 06:38 PM    pH (UA) 7.5 09/08/2019 06:38 PM    Protein NEGATIVE  09/08/2019 06:38 PM    Glucose NEGATIVE  09/08/2019 06:38 PM    Ketone NEGATIVE  09/08/2019 06:38 PM    Bilirubin NEGATIVE  09/08/2019 06:38 PM    Urobilinogen 0.2 09/08/2019 06:38 PM    Nitrites NEGATIVE  09/08/2019 06:38 PM    Leukocyte Esterase NEGATIVE  09/08/2019 06:38 PM    Epithelial cells FEW 09/08/2019 06:38 PM    Bacteria NEGATIVE  09/08/2019 06:38 PM    WBC 0-4 09/08/2019 06:38 PM    RBC 0-5 09/08/2019 06:38 PM         Medications Reviewed:     Current Facility-Administered Medications   Medication Dose Route Frequency    piperacillin-tazobactam (ZOSYN) 3.375 g in 0.9% sodium chloride (MBP/ADV) 100 mL  3.375 g IntraVENous Q8H    sodium chloride (NS) flush 5-10 mL  5-10 mL IntraVENous PRN    sodium chloride (NS) flush 5-40 mL  5-40 mL IntraVENous Q8H    sodium chloride (NS) flush 5-40 mL  5-40 mL IntraVENous PRN    ondansetron (ZOFRAN) injection 4 mg  4 mg IntraVENous Q4H PRN    HYDROmorphone (PF) (DILAUDID) injection 0.5 mg  0.5 mg IntraVENous Q4H PRN    acetaminophen (TYLENOL) solution 650 mg  650 mg Oral Q4H PRN    mesalamine DR (ASACOL HD) tablet 1,600 mg  1,600 mg Oral TID    escitalopram oxalate (LEXAPRO) tablet 20 mg  20 mg Oral DAILY    ferrous sulfate tablet 325 mg  1 Tab Oral DAILY WITH BREAKFAST     ______________________________________________________________________  EXPECTED LENGTH OF STAY: 4d 19h  ACTUAL LENGTH OF STAY:          2                 Magalys Montgomery MD     Patient's emergency contacts:  Extended Emergency Contact Information  Primary Emergency Contact: [place]  Phone: [phone]  Mobile Phone: [phone]  Relation: Mother

## 2019-09-10 NOTE — PROGRESS NOTES
Problem: Pain  Goal: *Control of Pain  Outcome: Progressing Towards Goal  Goal: *PALLIATIVE CARE:  Alleviation of Pain  Outcome: Progressing Towards Goal     Problem: Falls - Risk of  Goal: *Absence of Falls  Description  Document [facility] Fall Risk and appropriate interventions in the flowsheet. Outcome: Progressing Towards Goal  Note:   Fall Risk Interventions:                                Problem: Patient Education: Go to Patient Education Activity  Goal: Patient/Family Education  Outcome: Progressing Towards Goal     Problem: Risk for Spread of Infection  Goal: Prevent transmission of infectious organism to others  Description  Prevent the transmission of infectious organisms to other patients, staff members, and visitors.   Outcome: Progressing Towards Goal     Problem: Patient Education:  Go to Education Activity  Goal: Patient/Family Education  Outcome: Progressing Towards Goal

## 2019-09-11 LAB
C DIFF GDH STL QL: NEGATIVE
C DIFF TOX A+B STL QL IA: NEGATIVE
CAMPYLOBACTER SPECIES, DNA: NEGATIVE
ENTEROTOXIGEN E COLI, DNA: NEGATIVE
INTERPRETATION: NORMAL
P SHIGELLOIDES DNA STL QL NAA+PROBE: POSITIVE
SALMONELLA SPECIES, DNA: NEGATIVE
SHIGA TOXIN PRODUCING, DNA: NEGATIVE
SHIGELLA SP+EIEC IPAH STL QL NAA+PROBE: NEGATIVE
VIBRIO SPECIES, DNA: NEGATIVE
Y. ENTEROCOLITICA, DNA: NEGATIVE

## 2019-09-11 PROCEDURE — 74011000258 HC RX REV CODE- 258: Performed by: INTERNAL MEDICINE

## 2019-09-11 PROCEDURE — 74011250637 HC RX REV CODE- 250/637: Performed by: INTERNAL MEDICINE

## 2019-09-11 PROCEDURE — 65270000029 HC RM PRIVATE

## 2019-09-11 PROCEDURE — 74011250636 HC RX REV CODE- 250/636: Performed by: INTERNAL MEDICINE

## 2019-09-11 RX ADMIN — PIPERACILLIN SODIUM,TAZOBACTAM SODIUM 3.38 G: 3; .375 INJECTION, POWDER, FOR SOLUTION INTRAVENOUS at 23:39

## 2019-09-11 RX ADMIN — HYDROMORPHONE HYDROCHLORIDE 0.5 MG: 1 INJECTION, SOLUTION INTRAMUSCULAR; INTRAVENOUS; SUBCUTANEOUS at 21:02

## 2019-09-11 RX ADMIN — MESALAMINE 1600 MG: 800 TABLET, DELAYED RELEASE ORAL at 08:22

## 2019-09-11 RX ADMIN — HYDROMORPHONE HYDROCHLORIDE 0.5 MG: 1 INJECTION, SOLUTION INTRAMUSCULAR; INTRAVENOUS; SUBCUTANEOUS at 16:21

## 2019-09-11 RX ADMIN — HYDROMORPHONE HYDROCHLORIDE 0.5 MG: 1 INJECTION, SOLUTION INTRAMUSCULAR; INTRAVENOUS; SUBCUTANEOUS at 07:14

## 2019-09-11 RX ADMIN — Medication 10 ML: at 21:02

## 2019-09-11 RX ADMIN — Medication 10 ML: at 11:34

## 2019-09-11 RX ADMIN — HYDROMORPHONE HYDROCHLORIDE 0.5 MG: 1 INJECTION, SOLUTION INTRAMUSCULAR; INTRAVENOUS; SUBCUTANEOUS at 03:18

## 2019-09-11 RX ADMIN — MESALAMINE 1600 MG: 800 TABLET, DELAYED RELEASE ORAL at 21:00

## 2019-09-11 RX ADMIN — HYDROMORPHONE HYDROCHLORIDE 0.5 MG: 1 INJECTION, SOLUTION INTRAMUSCULAR; INTRAVENOUS; SUBCUTANEOUS at 11:34

## 2019-09-11 RX ADMIN — Medication 10 ML: at 06:37

## 2019-09-11 RX ADMIN — PIPERACILLIN SODIUM,TAZOBACTAM SODIUM 3.38 G: 3; .375 INJECTION, POWDER, FOR SOLUTION INTRAVENOUS at 14:02

## 2019-09-11 RX ADMIN — Medication 10 ML: at 14:03

## 2019-09-11 RX ADMIN — PIPERACILLIN SODIUM,TAZOBACTAM SODIUM 3.38 G: 3; .375 INJECTION, POWDER, FOR SOLUTION INTRAVENOUS at 06:36

## 2019-09-11 RX ADMIN — MESALAMINE 1600 MG: 800 TABLET, DELAYED RELEASE ORAL at 16:14

## 2019-09-11 RX ADMIN — ESCITALOPRAM OXALATE 20 MG: 10 TABLET ORAL at 08:22

## 2019-09-11 RX ADMIN — FERROUS SULFATE TAB 325 MG (65 MG ELEMENTAL FE) 325 MG: 325 (65 FE) TAB at 08:22

## 2019-09-11 RX ADMIN — Medication 10 ML: at 16:22

## 2019-09-11 NOTE — PROGRESS NOTES
Problem: Pain  Goal: *Control of Pain  Outcome: Progressing Towards Goal  Goal: *PALLIATIVE CARE:  Alleviation of Pain  Outcome: Progressing Towards Goal     Problem: Falls - Risk of  Goal: *Absence of Falls  Description  Document [facility] [place] [facility] Fall Risk and appropriate interventions in the flowsheet. Outcome: Progressing Towards Goal  Note:   Fall Risk Interventions:                                Problem: Patient Education: Go to Patient Education Activity  Goal: Patient/Family Education  Outcome: Progressing Towards Goal     Problem: Risk for Spread of Infection  Goal: Prevent transmission of infectious organism to others  Description  Prevent the transmission of infectious organisms to other patients, staff members, and visitors.   Outcome: Progressing Towards Goal     Problem: Patient Education:  Go to Education Activity  Goal: Patient/Family Education  Outcome: Progressing Towards Goal

## 2019-09-11 NOTE — PROGRESS NOTES
[address]  [address] [facility], [address]       GI PROGRESS NOTE  [facility]  [phone] NP in-hospital cell phone M-F until 4:30  After 5pm or on weekends, please call  for physician on call      NAME: Bradley Cornelius   :  1995   MRN:  [id number]       Subjective:   Patient reports feeling \"a little bit better\" today but she is still having RLQ abdominal pain that radiates to her LLQ. She does not report any nausea or vomiting and did have a bowel movement yesterday evening. Objective:     VITALS:   Last 24hrs VS reviewed since prior progress note. Most recent are:  Visit Vitals  BP 97/64 (BP 1 Location: Left arm, BP Patient Position: At rest)   Pulse 68   Temp 98.3 °F (36.8 °C)   Resp 18   Ht 5' 5\" (1.651 m)   Wt 67 kg (147 lb 9.6 oz)   SpO2 97%   Breastfeeding? No   BMI 24.56 kg/m²       PHYSICAL EXAM:  General: Cooperative, no acute distress    Neurologic:  Alert and oriented X 3. HEENT: EOMI, no scleral icterus   Lungs:  CTA bilaterally. No wheezing  Heart:  S1 S2, regular rhythm, no murmur   Abdomen: Soft, non-distended, no tenderness. +Bowel sounds  Extremities: No edema  Psych:   Good insight. Not anxious or agitated. Lab Data Reviewed:     No results found for this or any previous visit (from the past 24 hour(s)). Assessment:   Crohn's disease; on Humira and Mesalamine, CT showed abcess and fistula     Patient Active Problem List   Diagnosis Code    Crohn disease ([facility] [address] K50.90    Plantar wart of right foot B07.0    Psoriasis L40.9    Anxiety F41.9    Abscess of abdominal cavity ([facility] [address] K65.1     Plan:   · Continue Abx  · Stool studies pending  · Plan for repeat CT this week     Signed By: Matthew Lorenzana     2019  10:21 AM       I reviewed records, evaluated patient, and developed plan with LORAINE Riley.  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 Will continue IV Zosyn for now and confirm with ID. Brennon Villanueva MD

## 2019-09-11 NOTE — PROGRESS NOTES
Hospitalist Progress Note  Gui Peters MD  Answering service: [phone] OR [phone] from in house phone        Date of Service:  2019  NAME:  Sterling Heller  :  1995  MRN:  [id number]  PCP: Елена Howe MD    Chief Complaint:   Chief Complaint   Patient presents with    Inflammatory Bowel Disease    Abdominal Pain    Fever    Melena         Admission Summary:     Sterling Heller is a 26 y.o. female who states that she was diagnosed with Crohns disease at the age of 17. Patient states that two days ago, she started having bloody diarrhea, that has been ongoing about twice a day. She states that she also started having achy pain continuously in the right lower quadrant, which would sometimes intensify to a sharp transient pain. On the night of , patient felt sweaty and clammy, but did not take her temperature. She did take her temperature in the morning of  and found it to be 101°F. Later in the day, Temperature was even higher at 102.4°F. Patient then decided to come to the hospital. Patient denies any melena. She has been nauseous, but has not had any vomiting. .    Interval history / Subjective:     Patient is seen and examined at bedside. She feels ok. Tolerated diet. Pain still present but intermittent. She had a loose watery bowel movement. No nausea or vomiting. Discussed with colorectal surgery Dr. Jazmine Lozano and GI NP. Assessment & Plan:     Sepsis with intra-abdominal abscess - improving   Secondary to Crohn's disease    - CT abd: Marked inflammatory changes involving the distal small bowel loops in the right lower quadrant in keeping with history of Crohn disease.  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   -  Patient meets sepsis criteria with tachycardia and fever on poa but lactic acid levels are normal.  - IR consulted for abscess drainage but it was not done due ot possibility of adhesion  -  Colorectal Surgery on board. Diet advanced to regular, tolerated well  - ID consulted placed   -  c/w IV zosyn. 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 Crohns disease.   - Currently on humira - outpt   - c/w asacol   - GI following    Depression- lexapro     Code status: Full Code  DVT prophylaxis: mechanical prophylaxis  Care Plan discussed with: Patient/Family  Disposition: Home w/Family and TBD. Possible dc in 1-2 days     Hospital Problems  Date Reviewed: 6/12/2019          Codes Class Noted POA    * (Principal) Abscess of abdominal cavity ([facility] [address] ICD-10-CM: K65.1  ICD-9-CM: 567.22  9/8/2019 Yes        Crohn disease ([facility] [address] ICD-10-CM: K50.90  ICD-9-CM: 555.9  12/19/2016 Yes                Review of Systems:   A comprehensive review of systems was negative except for that written in the HPI. Physical Examination:     Visit Vitals  BP 97/64 (BP 1 Location: Left arm, BP Patient Position: At rest)   Pulse 68   Temp 98.3 °F (36.8 °C)   Resp 18   Ht 5' 5\" (1.651 m)   Wt 67 kg (147 lb 9.6 oz)   SpO2 97%   Breastfeeding? No   BMI 24.56 kg/m²     General:  Alert, cooperative, no distress, appears stated age. Head:  Normocephalic, without obvious abnormality, atraumatic. Lungs:   Clear to auscultation bilaterally. Chest wall:  No tenderness or deformity. Heart:  Regular rate and rhythm, S1, S2 normal, no murmur, click, rub or gallop. Abdomen:   Right lower quadrant tenderness. Bowel sounds normal. No masses,  No organomegaly. Extremities: Extremities normal, atraumatic, no cyanosis or edema. Pulses: 2+ and symmetric all extremities. Lymph nodes: Cervical, supraclavicular, and axillary nodes normal.   Neurologic: CNII-XII intact. Normal strength, sensation and r          Vital Signs:    Last 24hrs VS reviewed since prior progress note.  Most recent are:    Visit Vitals  BP 97/64 (BP 1 Location: Left arm, BP Patient Position: At rest)   Pulse 68   Temp 98.3 °F (36.8 °C)   Resp 18   Ht 5' 5\" (1.651 m)   Wt 67 kg (147 lb 9.6 oz)   SpO2 97%   Breastfeeding? No   BMI 24.56 kg/m²       No intake or output data in the 24 hours ending 19 1509     Tmax:  Temp (24hrs), Av.3 °F (36.8 °C), Min:98 °F (36.7 °C), Max:98.6 °F (37 °C)      Data Review:   Data reviewed by myself:  Ct Abd Pelv W Cont    Result Date: 2019  EXAM:  CT abdomen pelvis with contrast INDICATION: Abdominal pain and fever. Inflammatory bowel disease. COMPARISON: None. TECHNIQUE: Helical CT of the abdomen  and pelvis  following the uneventful intravenous administration of nonionic contrast.  Coronal and sagittal reformats are performed. CT dose reduction was achieved through use of a standardized protocol tailored for this examination and automatic exposure control for dose modulation. Adaptive statistical iterative reconstruction (ASIR) was utilized. FINDINGS: The visualized lung bases demonstrate no mass or consolidation. The heart size is normal. There is no pericardial or pleural effusion. The liver, spleen, pancreas, and adrenal glands are normal. The gall bladder is present  without intra- or extra-hepatic biliary dilatation. The kidneys are symmetric without hydronephrosis. There is marked inflammation involving small bowel loops in the right lower abdomen where there is distal ileum thickening and adjacent inflammation as well as mucosal hyperenhancement. 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 Enhancing but nonenlarged right lower mesenteric lymph nodes are likely reactive. There are no dilated bowel loops. The appendix is unremarkable. There are no enlarged lymph nodes. There is no free air. The urinary bladder is normal.  There is no pelvic mass. An IUD is present. The bony structures are age-appropriate.      IMPRESSION: Marked inflammatory changes involving the distal small bowel loops in the right lower quadrant in keeping with history of Crohn disease. 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 No bowel obstruction. Normal appendix. Xr Chest Port    Result Date: 9/8/2019  EXAM:  CR chest portable INDICATION: Fever. Abdominal pain. COMPARISON: None. TECHNIQUE: Portable AP upright chest view at 1956 hours FINDINGS: Cardiac monitoring wires overlie the thorax. The cardiomediastinal contours are within normal limits. The lungs and pleural spaces are clear. There is no pneumothorax. The bones and upper abdomen are unremarkable. IMPRESSION: No acute process.      No results found for: SDES  Lab Results   Component Value Date/Time    Culture result: NO GROWTH 3 DAYS 09/08/2019 06:25 PM    Culture result: NO GROWTH 3 DAYS 09/08/2019 06:25 PM     All Micro Results     Procedure Component Value Units Date/Time    C. DIFFICILE AG & TOXIN A/B [[id number]] Collected:  09/10/19 1114    Order Status:  Completed Specimen:  Stool Updated:  09/11/19 1204     [address] ANTIGEN NEGATIVE         C. difficile toxin NEGATIVE         INTERPRETATION       NEGATIVE FOR TOXIGENIC C. DIFFICILE          CULTURE, BLOOD [[id number]] Collected:  09/08/19 1825    Order Status:  Completed Specimen:  Blood Updated:  09/11/19 0507     Special Requests: NO SPECIAL REQUESTS        Culture result: NO GROWTH 3 DAYS       CULTURE, BLOOD [[id number]] Collected:  09/08/19 1825    Order Status:  Completed Specimen:  Blood Updated:  09/11/19 0507     Special Requests: NO SPECIAL REQUESTS        Culture result: NO GROWTH 3 DAYS       CULTURE, STOOL [[id number]] Collected:  09/09/19 1114    Order Status:  Completed Specimen:  Stool Updated:  09/10/19 1146    [facility] [place] [facility] [[id number]] Collected:  09/10/19 1114    Order Status:  Completed Specimen:  Stool Updated:  09/10/19 1145 URINE CULTURE HOLD SAMPLE [[id number]] Collected:  09/08/19 1838    Order Status:  Completed Specimen:  Urine from Serum Updated:  09/08/19 1846     Urine culture hold       URINE ON HOLD IN MICROBIOLOGY DEPT FOR 3 DAYS. IF UNPRESERVED URINE IS SUBMITTED, IT CANNOT BE USED FOR ADDITIONAL TESTING AFTER 24 HRS, RECOLLECTION WILL BE REQUIRED. Labs: reviewed by myself. Recent Labs     09/08/19 1825   WBC 8.1   HGB 12.3   HCT 39.9        Recent Labs     09/08/19 1825   *   K 3.6   CL 97   CO2 29   BUN 6   CREA 0.75   *   CA 9.1     Recent Labs     09/08/19 1825   SGOT 12*   ALT 16   AP 80   TBILI 0.5   TP 8.7*   ALB 3.2*   GLOB 5.5*     No results for input(s): INR, PTP, APTT in the last 72 hours. No lab exists for component: INREXT, INREXT   No results for input(s): FE, TIBC, PSAT, FERR in the last 72 hours. No results found for: FOL, RBCF   No results for input(s): PH, PCO2, PO2 in the last 72 hours. No results for input(s): CPK, CKNDX, TROIQ in the last 72 hours.     No lab exists for component: CPKMB  No results found for: CHOL, CHOLX, CHLST, CHOLV, HDL, HDLP, LDL, LDLC, DLDLP, TGLX, TRIGL, TRIGP, CHHD, CHHDX  No results found for: [facility]  Lab Results   Component Value Date/Time    Color YELLOW/STRAW 09/08/2019 06:38 PM    Appearance CLEAR 09/08/2019 06:38 PM    Specific gravity 1.008 09/08/2019 06:38 PM    pH (UA) 7.5 09/08/2019 06:38 PM    Protein NEGATIVE  09/08/2019 06:38 PM    Glucose NEGATIVE  09/08/2019 06:38 PM    Ketone NEGATIVE  09/08/2019 06:38 PM    Bilirubin NEGATIVE  09/08/2019 06:38 PM    Urobilinogen 0.2 09/08/2019 06:38 PM    Nitrites NEGATIVE  09/08/2019 06:38 PM    Leukocyte Esterase NEGATIVE  09/08/2019 06:38 PM    Epithelial cells FEW 09/08/2019 06:38 PM    Bacteria NEGATIVE  09/08/2019 06:38 PM    WBC 0-4 09/08/2019 06:38 PM    RBC 0-5 09/08/2019 06:38 PM         Medications Reviewed:     Current Facility-Administered Medications   Medication Dose Route Frequency    piperacillin-tazobactam (ZOSYN) 3.375 g in 0.9% sodium chloride (MBP/ADV) 100 mL  3.375 g IntraVENous Q8H    sodium chloride (NS) flush 5-10 mL  5-10 mL IntraVENous PRN    sodium chloride (NS) flush 5-40 mL  5-40 mL IntraVENous Q8H    sodium chloride (NS) flush 5-40 mL  5-40 mL IntraVENous PRN    ondansetron (ZOFRAN) injection 4 mg  4 mg IntraVENous Q4H PRN    HYDROmorphone (PF) (DILAUDID) injection 0.5 mg  0.5 mg IntraVENous Q4H PRN    acetaminophen (TYLENOL) solution 650 mg  650 mg Oral Q4H PRN    mesalamine DR (ASACOL HD) tablet 1,600 mg  1,600 mg Oral TID    escitalopram oxalate (LEXAPRO) tablet 20 mg  20 mg Oral DAILY    ferrous sulfate tablet 325 mg  1 Tab Oral DAILY WITH BREAKFAST     ______________________________________________________________________  EXPECTED LENGTH OF STAY: 4d 19h  ACTUAL LENGTH OF STAY:          3                 Aristeo Whitfield MD     Patient's emergency contacts:  Extended Emergency Contact Information  Primary Emergency Contact: [address] Phone: [phone]  Mobile Phone: [phone]  Relation: Mother

## 2019-09-12 ENCOUNTER — APPOINTMENT (OUTPATIENT)
Dept: CT IMAGING | Age: 24
DRG: 853 | End: 2019-09-12
Attending: NURSE PRACTITIONER
Payer: COMMERCIAL

## 2019-09-12 LAB
ANION GAP SERPL CALC-SCNC: 7 MMOL/L (ref 5–15)
BASOPHILS # BLD: 0 K/UL (ref 0–0.1)
BASOPHILS NFR BLD: 0 % (ref 0–1)
BUN SERPL-MCNC: 7 MG/DL (ref 6–20)
BUN/CREAT SERPL: 13 (ref 12–20)
CALCIUM SERPL-MCNC: 8.5 MG/DL (ref 8.5–10.1)
CHLORIDE SERPL-SCNC: 102 MMOL/L (ref 97–108)
CO2 SERPL-SCNC: 28 MMOL/L (ref 21–32)
CREAT SERPL-MCNC: 0.56 MG/DL (ref 0.55–1.02)
DIFFERENTIAL METHOD BLD: ABNORMAL
EOSINOPHIL # BLD: 0.1 K/UL (ref 0–0.4)
EOSINOPHIL NFR BLD: 1 % (ref 0–7)
ERYTHROCYTE [DISTWIDTH] IN BLOOD BY AUTOMATED COUNT: 12.7 % (ref 11.5–14.5)
GLUCOSE SERPL-MCNC: 104 MG/DL (ref 65–100)
HCT VFR BLD AUTO: 33.4 % (ref 35–47)
HGB BLD-MCNC: 10.2 G/DL (ref 11.5–16)
IMM GRANULOCYTES # BLD AUTO: 0 K/UL
IMM GRANULOCYTES NFR BLD AUTO: 0 %
LYMPHOCYTES # BLD: 0.4 K/UL (ref 0.8–3.5)
LYMPHOCYTES NFR BLD: 6 % (ref 12–49)
MCH RBC QN AUTO: 26 PG (ref 26–34)
MCHC RBC AUTO-ENTMCNC: 30.5 G/DL (ref 30–36.5)
MCV RBC AUTO: 85 FL (ref 80–99)
MONOCYTES # BLD: 0.6 K/UL (ref 0–1)
MONOCYTES NFR BLD: 9 % (ref 5–13)
NEUTS BAND NFR BLD MANUAL: 10 % (ref 0–6)
NEUTS SEG # BLD: 5.1 K/UL (ref 1.8–8)
NEUTS SEG NFR BLD: 74 % (ref 32–75)
NRBC # BLD: 0 K/UL (ref 0–0.01)
NRBC BLD-RTO: 0 PER 100 WBC
PLATELET # BLD AUTO: 291 K/UL (ref 150–400)
PLATELET COMMENTS,PCOM: ABNORMAL
PMV BLD AUTO: 9.9 FL (ref 8.9–12.9)
POTASSIUM SERPL-SCNC: 3.9 MMOL/L (ref 3.5–5.1)
RBC # BLD AUTO: 3.93 M/UL (ref 3.8–5.2)
RBC MORPH BLD: ABNORMAL
SODIUM SERPL-SCNC: 137 MMOL/L (ref 136–145)
WBC # BLD AUTO: 6.2 K/UL (ref 3.6–11)

## 2019-09-12 PROCEDURE — 65270000029 HC RM PRIVATE

## 2019-09-12 PROCEDURE — 74011000258 HC RX REV CODE- 258: Performed by: RADIOLOGY

## 2019-09-12 PROCEDURE — 36415 COLL VENOUS BLD VENIPUNCTURE: CPT

## 2019-09-12 PROCEDURE — 74011636320 HC RX REV CODE- 636/320: Performed by: RADIOLOGY

## 2019-09-12 PROCEDURE — 85025 COMPLETE CBC W/AUTO DIFF WBC: CPT

## 2019-09-12 PROCEDURE — 74177 CT ABD & PELVIS W/CONTRAST: CPT

## 2019-09-12 PROCEDURE — 74011250636 HC RX REV CODE- 250/636: Performed by: INTERNAL MEDICINE

## 2019-09-12 PROCEDURE — 80048 BASIC METABOLIC PNL TOTAL CA: CPT

## 2019-09-12 PROCEDURE — 74011000258 HC RX REV CODE- 258: Performed by: INTERNAL MEDICINE

## 2019-09-12 PROCEDURE — 74011250637 HC RX REV CODE- 250/637: Performed by: INTERNAL MEDICINE

## 2019-09-12 RX ORDER — SODIUM CHLORIDE 0.9 % (FLUSH) 0.9 %
10 SYRINGE (ML) INJECTION
Status: COMPLETED | OUTPATIENT
Start: 2019-09-12 | End: 2019-09-12

## 2019-09-12 RX ADMIN — Medication 10 ML: at 07:20

## 2019-09-12 RX ADMIN — SODIUM CHLORIDE 100 ML: 900 INJECTION, SOLUTION INTRAVENOUS at 12:52

## 2019-09-12 RX ADMIN — IOHEXOL 50 ML: 240 INJECTION, SOLUTION INTRATHECAL; INTRAVASCULAR; INTRAVENOUS; ORAL at 12:52

## 2019-09-12 RX ADMIN — HYDROMORPHONE HYDROCHLORIDE 0.5 MG: 1 INJECTION, SOLUTION INTRAMUSCULAR; INTRAVENOUS; SUBCUTANEOUS at 11:24

## 2019-09-12 RX ADMIN — HYDROMORPHONE HYDROCHLORIDE 0.5 MG: 1 INJECTION, SOLUTION INTRAMUSCULAR; INTRAVENOUS; SUBCUTANEOUS at 23:06

## 2019-09-12 RX ADMIN — MESALAMINE 1600 MG: 800 TABLET, DELAYED RELEASE ORAL at 22:19

## 2019-09-12 RX ADMIN — Medication 10 ML: at 12:52

## 2019-09-12 RX ADMIN — ESCITALOPRAM OXALATE 20 MG: 10 TABLET ORAL at 14:12

## 2019-09-12 RX ADMIN — HYDROMORPHONE HYDROCHLORIDE 0.5 MG: 1 INJECTION, SOLUTION INTRAMUSCULAR; INTRAVENOUS; SUBCUTANEOUS at 01:08

## 2019-09-12 RX ADMIN — MESALAMINE 1600 MG: 800 TABLET, DELAYED RELEASE ORAL at 18:36

## 2019-09-12 RX ADMIN — PIPERACILLIN SODIUM,TAZOBACTAM SODIUM 3.38 G: 3; .375 INJECTION, POWDER, FOR SOLUTION INTRAVENOUS at 07:20

## 2019-09-12 RX ADMIN — HYDROMORPHONE HYDROCHLORIDE 0.5 MG: 1 INJECTION, SOLUTION INTRAMUSCULAR; INTRAVENOUS; SUBCUTANEOUS at 19:43

## 2019-09-12 RX ADMIN — HYDROMORPHONE HYDROCHLORIDE 0.5 MG: 1 INJECTION, SOLUTION INTRAMUSCULAR; INTRAVENOUS; SUBCUTANEOUS at 07:21

## 2019-09-12 RX ADMIN — Medication 10 ML: at 14:12

## 2019-09-12 RX ADMIN — PIPERACILLIN SODIUM,TAZOBACTAM SODIUM 3.38 G: 3; .375 INJECTION, POWDER, FOR SOLUTION INTRAVENOUS at 15:02

## 2019-09-12 RX ADMIN — ONDANSETRON 4 MG: 2 INJECTION INTRAMUSCULAR; INTRAVENOUS at 11:24

## 2019-09-12 RX ADMIN — Medication 10 ML: at 22:23

## 2019-09-12 RX ADMIN — HYDROMORPHONE HYDROCHLORIDE 0.5 MG: 1 INJECTION, SOLUTION INTRAMUSCULAR; INTRAVENOUS; SUBCUTANEOUS at 15:06

## 2019-09-12 RX ADMIN — PIPERACILLIN SODIUM,TAZOBACTAM SODIUM 3.38 G: 3; .375 INJECTION, POWDER, FOR SOLUTION INTRAVENOUS at 22:20

## 2019-09-12 RX ADMIN — IOPAMIDOL 100 ML: 755 INJECTION, SOLUTION INTRAVENOUS at 12:52

## 2019-09-12 RX ADMIN — FERROUS SULFATE TAB 325 MG (65 MG ELEMENTAL FE) 325 MG: 325 (65 FE) TAB at 07:20

## 2019-09-12 RX ADMIN — MESALAMINE 1600 MG: 800 TABLET, DELAYED RELEASE ORAL at 14:13

## 2019-09-12 NOTE — PROGRESS NOTES
CM noted patient is not medically ready for discharge. Patient will go home with family. No care management needs noted at this time. CM will continue to monitor for updates. Patients family will transport at discharge.      CM will follow  FAWAD Knutson/FRANCESCA

## 2019-09-12 NOTE — PROGRESS NOTES
Hospitalist Progress Note  Bri Jones MD  Answering service: [phone] OR [phone] from in house phone        Date of Service:  2019  NAME:  Anamaria Small  :  1995  MRN:  [id number]  PCP: Quentin José MD    Chief Complaint:   Chief Complaint   Patient presents with    Inflammatory Bowel Disease    Abdominal Pain    Fever    Melena         Admission Summary:     Anamaria Small is a 26 y.o. female who states that she was diagnosed with Crohns disease at the age of 17. Patient states that two days ago, she started having bloody diarrhea, that has been ongoing about twice a day. She states that she also started having achy pain continuously in the right lower quadrant, which would sometimes intensify to a sharp transient pain. On the night of , patient felt sweaty and clammy, but did not take her temperature. She did take her temperature in the morning of  and found it to be 101°F. Later in the day, Temperature was even higher at 102.4°F. Patient then decided to come to the hospital. Patient denies any melena. She has been nauseous, but has not had any vomiting. .    Interval history / Subjective:     Patient is seen and examined at bedside. She feels ok. Tolerated diet. Pain still present but intermittent. She had a loose watery bowel movement. No nausea or vomiting. Discussed with colorectal surgery Dr. Lovely Armando and GI NP. Assessment & Plan:     Sepsis with intra-abdominal abscess - improving   Secondary to Crohn's disease    - CT abd: Marked inflammatory changes involving the distal small bowel loops in the right lower quadrant in keeping with history of Crohn disease.  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   -  Patient meets sepsis criteria with tachycardia and fever on poa but lactic acid levels are normal.  - IR consulted for abscess drainage but it was not done due ot possibility of adhesion  -  Colorectal Surgery on board. Diet advanced to regular, tolerated well  - stool cx positive for P. Shigelloides   - ID consult pending   -  c/w IV zosyn. 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 Crohns disease.   - Currently on humira - outpt   - c/w asacol   - GI following    Depression- lexapro     Code status: Full Code  DVT prophylaxis: mechanical prophylaxis  Care Plan discussed with: Patient/Family  Disposition: Home w/Family and TBD. Hospital Problems  Date Reviewed: 6/12/2019          Codes Class Noted POA    * (Principal) Abscess of abdominal cavity ([facility] [address] ICD-10-CM: K65.1  ICD-9-CM: 567.22  9/8/2019 Yes        Crohn disease ([facility] [address] ICD-10-CM: K50.90  ICD-9-CM: 555.9  12/19/2016 Yes                Review of Systems:   A comprehensive review of systems was negative except for that written in the HPI. Physical Examination:     Visit Vitals  /62 (BP 1 Location: Left arm, BP Patient Position: At rest)   Pulse 85   Temp 98 °F (36.7 °C)   Resp 18   Ht 5' 5\" (1.651 m)   Wt 72.7 kg (160 lb 4.8 oz)   SpO2 98%   Breastfeeding? No   BMI 26.68 kg/m²     General:  Alert, cooperative, no distress, appears stated age. Head:  Normocephalic, without obvious abnormality, atraumatic. Lungs:   Clear to auscultation bilaterally. Chest wall:  No tenderness or deformity. Heart:  Regular rate and rhythm, S1, S2 normal, no murmur, click, rub or gallop. Abdomen:   Right lower quadrant tenderness. Bowel sounds normal. No masses,  No organomegaly. Extremities: Extremities normal, atraumatic, no cyanosis or edema. Pulses: 2+ and symmetric all extremities.    Lymph nodes: Cervical, supraclavicular, and axillary nodes normal.   Neurologic: CNII-XII intact. Normal strength, sensation and r          Vital Signs:    Last 24hrs VS reviewed since prior progress note. Most recent are:    Visit Vitals  /62 (BP 1 Location: Left arm, BP Patient Position: At rest)   Pulse 85   Temp 98 °F (36.7 °C)   Resp 18   Ht 5' 5\" (1.651 m)   Wt 72.7 kg (160 lb 4.8 oz)   SpO2 98%   Breastfeeding? No   BMI 26.68 kg/m²       No intake or output data in the 24 hours ending 19 1508     Tmax:  Temp (24hrs), Av °F (36.7 °C), Min:97.8 °F (36.6 °C), Max:98.3 °F (36.8 °C)      Data Review:   Data reviewed by myself:  Ct Abd Pelv W Cont    Result Date: 2019  EXAM:  CT abdomen pelvis with contrast INDICATION: Abdominal pain and fever. Inflammatory bowel disease. COMPARISON: None. TECHNIQUE: Helical CT of the abdomen  and pelvis  following the uneventful intravenous administration of nonionic contrast.  Coronal and sagittal reformats are performed. CT dose reduction was achieved through use of a standardized protocol tailored for this examination and automatic exposure control for dose modulation. Adaptive statistical iterative reconstruction (ASIR) was utilized. FINDINGS: The visualized lung bases demonstrate no mass or consolidation. The heart size is normal. There is no pericardial or pleural effusion. The liver, spleen, pancreas, and adrenal glands are normal. The gall bladder is present  without intra- or extra-hepatic biliary dilatation. The kidneys are symmetric without hydronephrosis. There is marked inflammation involving small bowel loops in the right lower abdomen where there is distal ileum thickening and adjacent inflammation as well as mucosal hyperenhancement. 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 Enhancing but nonenlarged right lower mesenteric lymph nodes are likely reactive. There are no dilated bowel loops. The appendix is unremarkable. There are no enlarged lymph nodes. There is no free air. The urinary bladder is normal.  There is no pelvic mass. An IUD is present. The bony structures are age-appropriate. IMPRESSION: Marked inflammatory changes involving the distal small bowel loops in the right lower quadrant in keeping with history of Crohn disease. 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 No bowel obstruction. Normal appendix. Xr Chest Port    Result Date: 9/8/2019  EXAM:  CR chest portable INDICATION: Fever. Abdominal pain. COMPARISON: None. TECHNIQUE: Portable AP upright chest view at 1956 hours FINDINGS: Cardiac monitoring wires overlie the thorax. The cardiomediastinal contours are within normal limits. The lungs and pleural spaces are clear. There is no pneumothorax. The bones and upper abdomen are unremarkable. IMPRESSION: No acute process.      No results found for: SDES  Lab Results   Component Value Date/Time    Culture result: NO GROWTH 4 DAYS 09/08/2019 06:25 PM    Culture result: NO GROWTH 4 DAYS 09/08/2019 06:25 PM     All Micro Results     Procedure Component Value Units Date/Time    CULTURE, BLOOD [[id number]] Collected:  09/08/19 1825    Order Status:  Completed Specimen:  Blood Updated:  09/12/19 1103     Special Requests: NO SPECIAL REQUESTS        Culture result: NO GROWTH 4 DAYS       CULTURE, BLOOD [[id number]] Collected:  09/08/19 1825    Order Status:  Completed Specimen:  Blood Updated:  09/12/19 1103     Special Requests: NO SPECIAL REQUESTS        Culture result: NO GROWTH 4 DAYS       CULTURE, STOOL [[id number]]  (Abnormal) Collected:  09/09/19 1114    Order Status:  Completed Specimen:  Stool Updated:  09/11/19 1553     Shigella species, DNA NEGATIVE         Campylobacter species, DNA NEGATIVE         Vibrio species, DNA NEGATIVE         Enterotoxigen E Coli, DNA NEGATIVE         Shiga toxin producing, DNA NEGATIVE         Salmonella species, DNA NEGATIVE         P. shigelloides, DNA POSITIVE        Y. enterocolitica, DNA NEGATIVE        C. DIFFICILE AG & TOXIN A/B [[id number]] Collected:  09/10/19 1114    Order Status:  Completed Specimen:  Stool Updated:  09/11/19 1204     [place] ANTIGEN NEGATIVE         C. difficile toxin NEGATIVE         INTERPRETATION       NEGATIVE FOR TOXIGENIC C. DIFFICILE          OVA [facility], STOOL [[id number]] Collected:  09/10/19 1114    Order Status:  Completed Specimen:  Stool Updated:  09/10/19 1145    URINE CULTURE HOLD SAMPLE [[id number]] Collected:  09/08/19 1838    Order Status:  Completed Specimen:  Urine from Serum Updated:  09/08/19 1846     Urine culture hold       URINE ON HOLD IN MICROBIOLOGY DEPT FOR 3 DAYS. IF UNPRESERVED URINE IS SUBMITTED, IT CANNOT BE USED FOR ADDITIONAL TESTING AFTER 24 HRS, RECOLLECTION WILL BE REQUIRED. Labs: reviewed by myself. Recent Labs     09/12/19 0210   WBC 6.2   HGB 10.2*   HCT 33.4*        Recent Labs     09/12/19 0210      K 3.9      CO2 28   BUN 7   CREA 0.56   *   CA 8.5     No results for input(s): SGOT, GPT, ALT, AP, TBIL, TBILI, TP, ALB, GLOB, GGT, AML, LPSE in the last 72 hours. No lab exists for component: AMYP, HLPSE  No results for input(s): INR, PTP, APTT in the last 72 hours. No lab exists for component: INREXT, INREXT   No results for input(s): FE, TIBC, PSAT, FERR in the last 72 hours. No results found for: FOL, RBCF   No results for input(s): PH, PCO2, PO2 in the last 72 hours. No results for input(s): CPK, CKNDX, TROIQ in the last 72 hours.     No lab exists for component: CPKMB  No results found for: CHOL, CHOLX, CHLST, CHOLV, HDL, HDLP, LDL, LDLC, DLDLP, TGLX, TRIGL, TRIGP, CHHD, CHHDX  No results found for: [facility]  Lab Results   Component Value Date/Time    Color YELLOW/STRAW 09/08/2019 06:38 PM    Appearance CLEAR 09/08/2019 06:38 PM    Specific gravity 1.008 09/08/2019 06:38 PM    pH (UA) 7.5 09/08/2019 06:38 PM    Protein NEGATIVE  09/08/2019 06:38 PM    Glucose NEGATIVE  09/08/2019 06:38 PM    Ketone NEGATIVE  09/08/2019 06:38 PM    Bilirubin NEGATIVE  09/08/2019 06:38 PM    Urobilinogen 0.2 09/08/2019 06:38 PM    Nitrites NEGATIVE  09/08/2019 06:38 PM    Leukocyte Esterase NEGATIVE  09/08/2019 06:38 PM    Epithelial cells FEW 09/08/2019 06:38 PM    Bacteria NEGATIVE  09/08/2019 06:38 PM    WBC 0-4 09/08/2019 06:38 PM    RBC 0-5 09/08/2019 06:38 PM         Medications Reviewed:     Current Facility-Administered Medications   Medication Dose Route Frequency    piperacillin-tazobactam (ZOSYN) 3.375 g in 0.9% sodium chloride (MBP/ADV) 100 mL  3.375 g IntraVENous Q8H    sodium chloride (NS) flush 5-10 mL  5-10 mL IntraVENous PRN    sodium chloride (NS) flush 5-40 mL  5-40 mL IntraVENous Q8H    sodium chloride (NS) flush 5-40 mL  5-40 mL IntraVENous PRN    ondansetron (ZOFRAN) injection 4 mg  4 mg IntraVENous Q4H PRN    HYDROmorphone (PF) (DILAUDID) injection 0.5 mg  0.5 mg IntraVENous Q4H PRN    acetaminophen (TYLENOL) solution 650 mg  650 mg Oral Q4H PRN    mesalamine DR (ASACOL HD) tablet 1,600 mg  1,600 mg Oral TID    escitalopram oxalate (LEXAPRO) tablet 20 mg  20 mg Oral DAILY    ferrous sulfate tablet 325 mg  1 Tab Oral DAILY WITH BREAKFAST     ______________________________________________________________________  EXPECTED LENGTH OF STAY: 4d 19h  ACTUAL LENGTH OF STAY:          4                 Aleta Parr MD     Patient's emergency contacts:  Extended Emergency Contact Information  Primary Emergency Contact: [address] Phone: [phone]  Mobile Phone: [phone]  Relation: Mother

## 2019-09-12 NOTE — PROGRESS NOTES
[address]  [address] [facility], [address]       GI PROGRESS NOTE  Will Edgar Dockery  [phone] office  [phone] NP/PA in-hospital cell phone M-F until 4:30PM  After 5PM or on weekends, please call  for physician on call      NAME: Lars Mccollum   :  1995   MRN:  [id number]       Subjective:   Patient reports some improvement in abdominal pain. No nausea or vomiting. She had a watery bowel movement last night. No blood in the stool. Objective:     VITALS:   Last 24hrs VS reviewed since prior progress note. Most recent are:  Visit Vitals  BP 95/60   Pulse 86   Temp 98.1 °F (36.7 °C)   Resp 18   Ht 5' 5\" (1.651 m)   Wt 72.7 kg (160 lb 4.8 oz)   SpO2 96%   Breastfeeding? No   BMI 26.68 kg/m²       PHYSICAL EXAM:  General: Cooperative, no acute distress    Neurologic:  Alert and oriented X 3  HEENT: EOMI, no scleral icterus   Lungs:  CTA bilaterally anteriorly  Heart:  S1 S2  Abdomen: Soft, non-distended, RLQ tenderness, no guarding, no rebound. +Bowel sounds. Extremities: Warm  Psych:   Good insight. Not anxious or agitated. Lab Data Reviewed:     Recent Results (from the past 24 hour(s))   CBC WITH AUTOMATED DIFF    Collection Time: 19  2:10 AM   Result Value Ref Range    WBC 6.2 3.6 - 11.0 K/uL    RBC 3.93 3.80 - 5.20 M/uL    HGB 10.2 (L) 11.5 - 16.0 g/dL    HCT 33.4 (L) 35.0 - 47.0 %    MCV 85.0 80.0 - 99.0 FL    MCH 26.0 26.0 - 34.0 PG    MCHC 30.5 30.0 - 36.5 g/dL    RDW 12.7 11.5 - 14.5 %    PLATELET 588 836 - 270 K/uL    MPV 9.9 8.9 - 12.9 FL    NRBC 0.0 0  WBC    ABSOLUTE NRBC 0.00 0.00 - 0.01 K/uL    NEUTROPHILS 74 32 - 75 %    BAND NEUTROPHILS 10 (H) 0 - 6 %    LYMPHOCYTES 6 (L) 12 - 49 %    MONOCYTES 9 5 - 13 %    EOSINOPHILS 1 0 - 7 %    BASOPHILS 0 0 - 1 %    IMMATURE GRANULOCYTES 0 %    ABS. NEUTROPHILS 5.1 1.8 - 8.0 K/UL    ABS. LYMPHOCYTES 0.4 (L) 0.8 - 3.5 K/UL    ABS. MONOCYTES 0.6 0.0 - 1.0 K/UL    ABS.  EOSINOPHILS 0.1 0.0 - 0.4 K/UL    ABS. BASOPHILS 0.0 0.0 - 0.1 K/UL    ABS. IMM. GRANS. 0.0 K/UL    DF MANUAL      PLATELET COMMENTS Large Platelets      RBC COMMENTS NORMOCYTIC, NORMOCHROMIC     METABOLIC PANEL, BASIC    Collection Time: 09/12/19  2:10 AM   Result Value Ref Range    Sodium 137 136 - 145 mmol/L    Potassium 3.9 3.5 - 5.1 mmol/L    Chloride 102 97 - 108 mmol/L    CO2 28 21 - 32 mmol/L    Anion gap 7 5 - 15 mmol/L    Glucose 104 (H) 65 - 100 mg/dL    BUN 7 6 - 20 MG/DL    Creatinine 0.56 0.55 - 1.02 MG/DL    BUN/Creatinine ratio 13 12 - 20      GFR est AA >60 >60 ml/min/1.73m2    GFR est non-AA >60 >60 ml/min/1.73m2    Calcium 8.5 8.5 - 10.1 MG/DL       Assessment:   · Crohn's disease: on Humira and mesalamine. CT showed abscess and fistula. WBC 6.2, Hgb 10.2. Stool panel (9/9): positive for Plesiomonas shigelloides. C. difficile negative. 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 ID consulted. Patient Active Problem List   Diagnosis Code    Crohn disease ([facility] K50.90    Plantar wart of right foot B07.0    Psoriasis L40.9    Anxiety F41.9    [facility] K65.1     Plan:   · On mesalamine  · Continue antibiotics: on Zosyn. ID consulted. · Continue supportive measures  · Plan for repeat CT abdomen/pelvis with IV and oral contrast today. Discussed with RN at the bedside. Signed By: GREY Toscano     9/12/2019  9:35 AM        This patient was seen and examined by me in a face-to-face visit today. I reviewed the medical record including lab work, imaging and other provider notes. I confirmed the interval history as described above. I spoke to the patient, discussing our findings and plans. I discussed this case in detail with Dillon Deshpande, [address] Marks. I formulated an updated  assessment of this patient and guided our treatment plan. I agree with the above progress note. I agree with the history, exam and assessment and plan as outlined in the note.   I would like to add the following: She is slowly clinically improving; however, she is still fairly tender on exam. CT findings from today noted. By my review, there does not appear to be a good target for drainage given the length of TI involved, multiple fistulae, and fluid collection, which per radiology may be a right ovarian cyst. Will await ID consultation recommendation for role of narrowing antibiotics at this time factoring in Plesiomonas shigelloides found in stool in the setting of Crohn's fistulae and abscess. Additionally, she shares today that she consumed sushi a few days prior to the acute worsening of her symptoms last week. Based on her history, it is likely that she had a flare of her Crohn's evolving over several weeks and that she may have sustained a superimposed bowel infection, which may have been due to seafood exposure. I am concerned by the extent of her TI involvement and multiple fistulae that surgery may be needed to achieve a state of remission. Brennon Aldrich MD

## 2019-09-12 NOTE — PROGRESS NOTES
Problem: Pain  Goal: *Control of Pain  Outcome: Progressing Towards Goal  Goal: *PALLIATIVE CARE:  Alleviation of Pain  Outcome: Progressing Towards Goal     Problem: Falls - Risk of  Goal: *Absence of Falls  Description  Document Mohsen Jenkins Fall Risk and appropriate interventions in the flowsheet. Outcome: Progressing Towards Goal  Note:   Fall Risk Interventions:            Medication Interventions: Patient to call before getting OOB                   Problem: Patient Education: Go to Patient Education Activity  Goal: Patient/Family Education  Outcome: Progressing Towards Goal     Problem: Risk for Spread of Infection  Goal: Prevent transmission of infectious organism to others  Description  Prevent the transmission of infectious organisms to other patients, staff members, and visitors.   Outcome: Progressing Towards Goal     Problem: Patient Education:  Go to Education Activity  Goal: Patient/Family Education  Outcome: Progressing Towards Goal

## 2019-09-13 PROCEDURE — 65270000029 HC RM PRIVATE

## 2019-09-13 PROCEDURE — 74011000258 HC RX REV CODE- 258: Performed by: INTERNAL MEDICINE

## 2019-09-13 PROCEDURE — 74011250636 HC RX REV CODE- 250/636: Performed by: INTERNAL MEDICINE

## 2019-09-13 PROCEDURE — 74011250637 HC RX REV CODE- 250/637: Performed by: INTERNAL MEDICINE

## 2019-09-13 RX ORDER — OXYCODONE AND ACETAMINOPHEN 5; 325 MG/1; MG/1
1 TABLET ORAL
Status: DISCONTINUED | OUTPATIENT
Start: 2019-09-13 | End: 2019-09-18

## 2019-09-13 RX ADMIN — MESALAMINE 1600 MG: 800 TABLET, DELAYED RELEASE ORAL at 21:33

## 2019-09-13 RX ADMIN — Medication 10 ML: at 21:33

## 2019-09-13 RX ADMIN — MESALAMINE 1600 MG: 800 TABLET, DELAYED RELEASE ORAL at 09:18

## 2019-09-13 RX ADMIN — Medication 10 ML: at 06:32

## 2019-09-13 RX ADMIN — MESALAMINE 1600 MG: 800 TABLET, DELAYED RELEASE ORAL at 15:03

## 2019-09-13 RX ADMIN — FERROUS SULFATE TAB 325 MG (65 MG ELEMENTAL FE) 325 MG: 325 (65 FE) TAB at 06:34

## 2019-09-13 RX ADMIN — PIPERACILLIN SODIUM,TAZOBACTAM SODIUM 3.38 G: 3; .375 INJECTION, POWDER, FOR SOLUTION INTRAVENOUS at 23:11

## 2019-09-13 RX ADMIN — HYDROMORPHONE HYDROCHLORIDE 0.5 MG: 1 INJECTION, SOLUTION INTRAMUSCULAR; INTRAVENOUS; SUBCUTANEOUS at 14:55

## 2019-09-13 RX ADMIN — ESCITALOPRAM OXALATE 20 MG: 10 TABLET ORAL at 09:18

## 2019-09-13 RX ADMIN — PIPERACILLIN SODIUM,TAZOBACTAM SODIUM 3.38 G: 3; .375 INJECTION, POWDER, FOR SOLUTION INTRAVENOUS at 14:05

## 2019-09-13 RX ADMIN — OXYCODONE AND ACETAMINOPHEN 1 TABLET: 5; 325 TABLET ORAL at 19:11

## 2019-09-13 RX ADMIN — PIPERACILLIN SODIUM,TAZOBACTAM SODIUM 3.38 G: 3; .375 INJECTION, POWDER, FOR SOLUTION INTRAVENOUS at 06:28

## 2019-09-13 RX ADMIN — HYDROMORPHONE HYDROCHLORIDE 0.5 MG: 1 INJECTION, SOLUTION INTRAMUSCULAR; INTRAVENOUS; SUBCUTANEOUS at 10:46

## 2019-09-13 RX ADMIN — HYDROMORPHONE HYDROCHLORIDE 0.5 MG: 1 INJECTION, SOLUTION INTRAMUSCULAR; INTRAVENOUS; SUBCUTANEOUS at 06:31

## 2019-09-13 RX ADMIN — HYDROMORPHONE HYDROCHLORIDE 0.5 MG: 1 INJECTION, SOLUTION INTRAMUSCULAR; INTRAVENOUS; SUBCUTANEOUS at 02:56

## 2019-09-13 RX ADMIN — Medication 10 ML: at 14:55

## 2019-09-13 NOTE — PROGRESS NOTES
Problem: Pain  Goal: *Control of Pain  Outcome: Progressing Towards Goal     Problem: Falls - Risk of  Goal: *Absence of Falls  Description  Document Pilo Black Fall Risk and appropriate interventions in the flowsheet. Outcome: Progressing Towards Goal  Note:   Fall Risk Interventions:   Bed in lowest position, call light within reach, gripper socks on when ambulating.        Medication Interventions: Evaluate medications/consider consulting pharmacy      Problem: Patient Education: Go to Patient Education Activity  Goal: Patient/Family Education  Outcome: Progressing Towards Goal

## 2019-09-13 NOTE — ADT AUTH CERT NOTES
LOC:Acute Adult-Infection: GI//GYN (9/12/2019) by Jazmine Pineda         Review Status Review Entered   In Primary 9/13/2019 11:02       Criteria Review   REVIEW SUMMARY     Patient: Yeyo Barcenas  Review Number: [id number]  Review Status: In Primary     Condition Specific: Yes     Condition Level Of Care Code: ACUTE  Condition Level Of Care Description: Acute        OUTCOMES  Outcome Type: Primary           REVIEW DETAILS     Service Date: 09/12/2019  Admit Date: 09/08/2019  Product: Ronald Davis Adult  Subset: Infection: GI//GYN      (Symptom or finding within 24h)         (Excludes PO medications unless noted)          [X] Select Day, One:              [X] Episode Day 3-8, One:                  [X] ACUTE, One:                  ~--Admin, IQ Admin Admin on 09- 11:02 AM--~                  pt cont to have abd pain req max IV pain med dosing                  Pt also remains on IV abx as documented                  Pt indicates had a watery BM last night/no blood                  Episode of nausea req IV antiemetic today                  Infectious disease consult: active Crohns with abdominopelvic abscess.  Cont zosyn                                    103/62, 85, 98.0, RR 18, RA O2 sat 98%                  72.7kg                  hgb 10.2, hct 33.4                  gluc 104                  stool neg for ciff but positive for P. shigelloides                                    CT abd 9/12 with contrast: Extensive lower quadrant inflammatory process tethering multiple loops of distal small bowel consistent with clinical history of Crohn's disease and multiple forming enteroenteric fistulas                                    add meds:                   lexapro 20mg po QD                  ferrous sulfate 325mg po daily                  asacol HD 1600mg po tid                  zofran 4mg IV Q4H PRN x1                                    reg diet                  scds                                    Plan: ·         On mesalamine                  ·         Continue antibiotics: on Zosyn. ID consulted. ·         Continue supportive measures                  ·         Plan for repeat CT abdomen/pelvis with IV and oral contrast today. [X] Partial responder, not clinically stable for discharge and requires continued stay, >= One:                          [X] Infection unresolved, All:                              [X] Infection, >= One:                                  [X] Intra-abdominal abscess <= 8d since admission                                  ~--Admin, IQ Admin Admin on 09- 10:53 AM--~                                  ·      Crohn's disease: on Humira and mesalamine. CT showed abscess and fistula. WBC 6.2, Hgb 10.2. Stool panel (9/9): positive for Plesiomonas shigelloides. C. difficile negative. 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  ID consulted                                                                                                                                    [X] Finding, >= One:                                  [X] Severe pain, One:                                      [X] Requiring analgesic >= 3x/24h                                      ~--Admin, IQ Admin Admin on 09- 10:55 AM--~                                      dilaudid 0.5mg IV Q4H PRN x6 (max dosing)                                                                                                                                                [X] Anti-infective                              ~--Admin, IQ Admin Admin on 09- 10:54 AM--~                              zosyn 3.375gram IV Q8H                                                                                               Version: [employer]® 2018.1  Alex Paidlla  © 2018 [employer] and/or one of its subsidiaries. All Rights Reserved. CPT only © 2017 [employer]. All Rights Reserved.

## 2019-09-13 NOTE — PROGRESS NOTES
[address] [place] [address] Dr Ender Kaur [facility], [address] [place] [address]       GI PROGRESS NOTE  [facility] office  [phone] NP/PA in-hospital cell phone M-F until 4:30PM  After 5PM or on weekends, please call  for physician on call      NAME: Jet Marti   :  1995   MRN:  [id number]       Subjective:   She is still having abdominal pain. No nausea or vomiting. She reports ~ 10 watery bowel movements over the past 24 hours    Objective:     VITALS:   Last 24hrs VS reviewed since prior progress note. Most recent are:  Visit Vitals  BP 97/62 (BP 1 Location: Right arm, BP Patient Position: At rest;Head of bed elevated (Comment degrees))   Pulse 69   Temp 98.6 °F (37 °C)   Resp 17   Ht 5' 5\" (1.651 m)   Wt 72.7 kg (160 lb 4.8 oz)   SpO2 100%   Breastfeeding? No   BMI 26.68 kg/m²       PHYSICAL EXAM:  General: Cooperative, no acute distress    Neurologic:  Alert and oriented X 3  HEENT: EOMI, no scleral icterus   Lungs:  CTA bilaterally anteriorly  Heart:  S1 S2  Abdomen: Soft, non-distended, RLQ tenderness, no guarding, no rebound. +Bowel sounds. Extremities: Warm  Psych:   Good insight. Not anxious or agitated. Lab Data Reviewed:     No results found for this or any previous visit (from the past 24 hour(s)). Assessment:   · Crohn's disease: on Humira and mesalamine. CT showed abscess and fistula. WBC 6.2, Hgb 10.2. Stool panel (): positive for Plesiomonas shigelloides. C. difficile negative. 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 ID consulted.       Patient Active Problem List   Diagnosis Code    Crohn disease ([facility] K50.90    Plantar wart of right foot B07.0    Psoriasis L40.9    Anxiety F41.9    Abscess of abdominal cavity ([facility] K65.1     Plan:   · Discussed with Dr. Renetta Gillette yesterday, no drainable abscess on CT  · Discussed with Dr. Moraima Souza, may need surgery next week, he will call patient  · Encourage nutritional supplements  · On mesalamine  · Antibiotics per ID   · Supportive measures  · Appreciate ID consult     Signed By: Michael Sheikh NP     9/13/2019  9:35 AM       GI Attending: Agree with above plan. Appreciate CRS, ID, and IR assistance. Weekend team to follow. Jayant P. Nicole Closs, MD

## 2019-09-13 NOTE — PROGRESS NOTES
Infectious Diseases Consultation     Please see dictated note     Impression      1. Active Crohns with abdominopelvic abscess    Recommend:      1.  Continue Ijeoma Ann MD  9/12/2019  8:20 PM

## 2019-09-13 NOTE — CONSULTS
[address]    Name:  Dahiana Geiger  MR#:  [id number]  :  1995  ACCOUNT #:  [de-identified]  DATE OF SERVICE:  2019    LOCATION:  The patient is in 1. ATTENDING PHYSICIAN:  Ariana Hearn MD    CHIEF COMPLAINT:  Abdominal pain, fever, and diarrhea. REASON FOR CONSULTATION:  Pelvic abscess in a patient with Crohn's disease. The consultation was requested by Dr. Rosaura Nguyen. The history is obtained by interviewing the patient and review of the medical records. HISTORY OF PRESENT ILLNESS:  This patient is a 51-year-old white female diagnosed with Crohn's disease when she was about 17years old. She has been on Humira since about 2016 and states she has been on Apriso ever since the diagnosis of Crohn's disease. She has never had abdominal surgery. The patient was doing well until about 2019 when she developed right mid and lower abdominal pain and then fevers up to 102. She has associated headaches. She subsequently has also developed some watery diarrhea. She was admitted to the hospital on 2019. Evaluation revealed evidence of active Crohn's disease in the small bowel and entero-enteric fistulae with an abdominopelvic abscess. The patient was started on Zosyn. Her fever has resolved and she generally feels better but still has diarrhea. PAST MEDICAL HISTORY:  Tobacco none. Alcohol occasional social use. MEDICATIONS PRIOR TO ADMISSION:  1. Humira 40 mg subcutaneously every other week. 2.  Apriso 0.375 g - 4 pills (1.5 g) daily. 3.  Lexapro 20 mg daily. 4.  Iron sulfate 325 mg daily. 5.  Mirena intrauterine device. 6.  Omeprazole 40 mg daily. 7.  Kenalog 0.5% ointment b.i.d. as needed. ALLERGIES:  NONE. ILLNESSES:  Crohn's disease. SURGERIES:  None. SOCIAL HISTORY:  The patient just graduated from [employer] last spring and she is currently a .   She is living here in the [place] area with her mother. FAMILY HISTORY:  Otherwise unremarkable. REVIEW OF SYSTEMS:  CONSTITUTIONAL:  She had fevers on admission up to 102. HEENT:  No headache or earache and no visual change. No sore throat. LYMPHATIC:  No history of adenopathy. DERMATOLOGIC:  No rashes. RESPIRATORY:  No cough. CARDIOVASCULAR:  No chest pain. GASTROINTESTINAL:  As in HPI. GENITOURINARY:  No dysuria. MUSCULOSKELETAL:  No myalgias or arthralgias. NEUROLOGIC:  No history of seizure, stroke, syncope. PSYCHIATRIC:  She also has some depression. PHYSICAL EXAMINATION:  GENERAL:  No acute distress. VITAL SIGNS:  Blood pressure is 103/62, heart rate 85, respiratory rate 18. She is afebrile. Weight 160 pounds. HEENT:  Sclerae and conjunctivae are benign, oropharynx is benign. NECK:  Supple. NODES:  No adenopathy. SKIN:  No rashes. LUNGS:  Clear. HEART:  Regular rate and rhythm without murmurs. ABDOMEN:  Soft, nondistended, she is tender in the right mid and lower abdomen. Bowel sounds are somewhat high pitched and tinkling. BACK:  No CVA tenderness. PELVIC:  Not done. EXTREMITIES:  No edema. No cellulitis. MUSCULOSKELETAL:  Muscles nontender and joints benign. NEUROLOGIC:  Alert and oriented. LABORATORY DATA:  CBC reveals a hemoglobin of 10.2, white count 6200, and normal platelet count. Chemistry data reveals BUN 7, creatinine 0.56. RADIOLOGY DATA:  CT scan of the abdomen and pelvis shows evidence of active Crohn's disease with an evolving entero-enteric fistulae and a probable abscess in the right lower quadrant. IMPRESSION:  Active Crohn's disease with evolving entero-enteric fistulae and an abdominal pelvic abscess: The patient has improved significantly on Zosyn. I will leave her on this. She may need to go home on IV Zosyn. RECOMMENDATIONS:  Continue Zosyn. Thank you very much for allowing us to see this patient with you.       Nevin Robbins MD      [id number]  D:  09/13/2019 1:18  T:  09/13/2019 1:28  JOB #:  [id number]  CC:   MD Baljinder West MD Scherrie Kalata, MD (Delete CC field if not dictated.)

## 2019-09-13 NOTE — PROGRESS NOTES
Hospitalist Progress Note  Riley Montes De Oca MD  Answering service: [phone] from in house phone        Date of Service:  2019  NAME:  Atul Hilton  :  1995  MRN:  [id number]  PCP: Bibiana Morocho MD    Chief Complaint:   Chief Complaint   Patient presents with    Inflammatory Bowel Disease    Abdominal Pain    Fever    Melena         Admission Summary:     Atul Hilton is a 26 y.o. female who states that she was diagnosed with Crohns disease at the age of 17. Patient states that two days ago, she started having bloody diarrhea, that has been ongoing about twice a day. She states that she also started having achy pain continuously in the right lower quadrant, which would sometimes intensify to a sharp transient pain. On the night of , patient felt sweaty and clammy, but did not take her temperature. She did take her temperature in the morning of  and found it to be 101°F. Later in the day, Temperature was even higher at 102.4°F. Patient then decided to come to the hospital. Patient denies any melena. She has been nauseous, but has not had any vomiting. .    Interval history / Subjective:     Patient is seen and examined at bedside. She feels ok. Tolerated diet. Pain still present but intermittent. She had 10 loose watery bowel movement. No nausea or vomiting. Discussed with GI NP. Assessment & Plan:     Sepsis with intra-abdominal abscess - improving   Secondary to Crohn's disease    - CT abd: Marked inflammatory changes involving the distal small bowel loops in the right lower quadrant in keeping with history of Crohn disease.  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   -  Patient meets sepsis criteria with tachycardia and fever on poa but lactic acid levels are normal.  - IR consulted for abscess drainage but it was not done due ot possibility of adhesion  -  Colorectal Surgery on board. Diet advanced to regular, tolerating well  - stool cx positive for P. Shigelloides   - ID on board  -  c/w IV zosyn. - Rpt CT abd 9/12: Extensive lower quadrant inflammatory process tethering multiple loops of distal small bowel consistent with clinical history of Crohn's disease and multiple forming enteroenteric fistulas  - still has abdominal pain and loose bowel movements  - possible surgery next week per Dr. 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 Crohns disease.   - Currently on humira - outpt   - c/w asacol   - GI following    Depression- lexapro     Code status: Full Code  DVT prophylaxis: mechanical prophylaxis  Care Plan discussed with: Patient/Family  Disposition: Home w/Family and TBD. Hospital Problems  Date Reviewed: 6/12/2019          Codes Class Noted POA    * (Principal) [facility] ICD-10-CM: K65.1  ICD-9-CM: 567.22  9/8/2019 Yes        Crohn disease ([facility] ICD-10-CM: K50.90  ICD-9-CM: 555.9  12/19/2016 Yes                Review of Systems:   A comprehensive review of systems was negative except for that written in the HPI. Physical Examination:     Visit Vitals  BP 97/62 (BP 1 Location: Right arm, BP Patient Position: At rest;Head of bed elevated (Comment degrees)) Comment (BP Patient Position): 35   Pulse 69   Temp 98.6 °F (37 °C)   Resp 17   Ht 5' 5\" (1.651 m)   Wt 72.7 kg (160 lb 4.8 oz)   SpO2 100%   Breastfeeding? No   BMI 26.68 kg/m²     General:  Alert, cooperative, no distress, appears stated age. Head:  Normocephalic, without obvious abnormality, atraumatic. Lungs:   Clear to auscultation bilaterally. Chest wall:  No tenderness or deformity. Heart:  Regular rate and rhythm, S1, S2 normal, no murmur, click, rub or gallop. Abdomen:   Right lower quadrant tenderness. Bowel sounds normal. No masses,  No organomegaly.    Extremities: Extremities normal, atraumatic, no cyanosis or edema. Pulses: 2+ and symmetric all extremities. Lymph nodes: Cervical, supraclavicular, and axillary nodes normal.   Neurologic: CNII-XII intact. Normal strength, sensation and r          Vital Signs:    Last 24hrs VS reviewed since prior progress note. Most recent are:    Visit Vitals  BP 97/62 (BP 1 Location: Right arm, BP Patient Position: At rest;Head of bed elevated (Comment degrees))   Pulse 69   Temp 98.6 °F (37 °C)   Resp 17   Ht 5' 5\" (1.651 m)   Wt 72.7 kg (160 lb 4.8 oz)   SpO2 100%   Breastfeeding? No   BMI 26.68 kg/m²         Intake/Output Summary (Last 24 hours) at 2019 1232  Last data filed at 2019 0900  Gross per 24 hour   Intake 220 ml   Output    Net 220 ml        Tmax:  Temp (24hrs), Av.5 °F (36.9 °C), Min:98 °F (36.7 °C), Max:98.9 °F (37.2 °C)      Data Review:   Data reviewed by myself:  Ct Abd Pelv W Cont    Result Date: 2019  EXAM:  CT abdomen pelvis with contrast INDICATION: Abdominal pain and fever. Inflammatory bowel disease. COMPARISON: None. TECHNIQUE: Helical CT of the abdomen  and pelvis  following the uneventful intravenous administration of nonionic contrast.  Coronal and sagittal reformats are performed. CT dose reduction was achieved through use of a standardized protocol tailored for this examination and automatic exposure control for dose modulation. Adaptive statistical iterative reconstruction (ASIR) was utilized. FINDINGS: The visualized lung bases demonstrate no mass or consolidation. The heart size is normal. There is no pericardial or pleural effusion. The liver, spleen, pancreas, and adrenal glands are normal. The gall bladder is present  without intra- or extra-hepatic biliary dilatation. The kidneys are symmetric without hydronephrosis. There is marked inflammation involving small bowel loops in the right lower abdomen where there is distal ileum thickening and adjacent inflammation as well as mucosal hyperenhancement.  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 Enhancing but nonenlarged right lower mesenteric lymph nodes are likely reactive. There are no dilated bowel loops. The appendix is unremarkable. There are no enlarged lymph nodes. There is no free air. The urinary bladder is normal.  There is no pelvic mass. An IUD is present. The bony structures are age-appropriate. IMPRESSION: Marked inflammatory changes involving the distal small bowel loops in the right lower quadrant in keeping with history of Crohn disease. 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 No bowel obstruction. Normal appendix. Xr Chest Port    Result Date: 9/8/2019  EXAM:  CR chest portable INDICATION: Fever. Abdominal pain. COMPARISON: None. TECHNIQUE: Portable AP upright chest view at 1956 hours FINDINGS: Cardiac monitoring wires overlie the thorax. The cardiomediastinal contours are within normal limits. The lungs and pleural spaces are clear. There is no pneumothorax. The bones and upper abdomen are unremarkable. IMPRESSION: No acute process.      No results found for: SDES  Lab Results   Component Value Date/Time    Culture result: NO GROWTH 5 DAYS 09/08/2019 06:25 PM    Culture result: NO GROWTH 5 DAYS 09/08/2019 06:25 PM     All Micro Results     Procedure Component Value Units Date/Time    CULTURE, BLOOD [[id number]] Collected:  09/08/19 1825    Order Status:  Completed Specimen:  Blood Updated:  09/13/19 0648     Special Requests: NO SPECIAL REQUESTS        Culture result: NO GROWTH 5 DAYS       CULTURE, BLOOD [[id number]] Collected:  09/08/19 1825    Order Status:  Completed Specimen:  Blood Updated:  09/13/19 0648     Special Requests: NO SPECIAL REQUESTS        Culture result: NO GROWTH 5 DAYS       CULTURE, STOOL [[id number]]  (Abnormal) Collected: 09/09/19 1114    Order Status:  Completed Specimen:  Stool Updated:  09/11/19 1553     Shigella species, DNA NEGATIVE         Campylobacter species, DNA NEGATIVE         Vibrio species, DNA NEGATIVE         Enterotoxigen E Coli, DNA NEGATIVE         Shiga toxin producing, DNA NEGATIVE         Salmonella species, DNA NEGATIVE         P. shigelloides, DNA POSITIVE        Y. enterocolitica, DNA NEGATIVE        C. DIFFICILE AG & TOXIN A/B [[id number]] Collected:  09/10/19 1114    Order Status:  Completed Specimen:  Stool Updated:  09/11/19 1204     [address] ANTIGEN NEGATIVE         C. difficile toxin NEGATIVE         INTERPRETATION       NEGATIVE FOR TOXIGENIC C. DIFFICILE          OVA & PARASITES, STOOL [[id number]] Collected:  09/10/19 1114    Order Status:  Completed Specimen:  Stool Updated:  09/10/19 1145    URINE CULTURE HOLD SAMPLE [[id number]] Collected:  09/08/19 1838    Order Status:  Completed Specimen:  Urine from Serum Updated:  09/08/19 1846     Urine culture hold       URINE ON HOLD IN MICROBIOLOGY DEPT FOR 3 DAYS. IF UNPRESERVED URINE IS SUBMITTED, IT CANNOT BE USED FOR ADDITIONAL TESTING AFTER 24 HRS, RECOLLECTION WILL BE REQUIRED. Labs: reviewed by myself. Recent Labs     09/12/19  0210   WBC 6.2   HGB 10.2*   HCT 33.4*        Recent Labs     09/12/19 0210      K 3.9      CO2 28   BUN 7   CREA 0.56   *   CA 8.5     No results for input(s): SGOT, GPT, ALT, AP, TBIL, TBILI, TP, ALB, GLOB, GGT, AML, LPSE in the last 72 hours. No lab exists for component: AMYP, HLPSE  No results for input(s): INR, PTP, APTT in the last 72 hours. No lab exists for component: INREXT, INREXT   No results for input(s): FE, TIBC, PSAT, FERR in the last 72 hours. No results found for: FOL, RBCF   No results for input(s): PH, PCO2, PO2 in the last 72 hours. No results for input(s): CPK, CKNDX, TROIQ in the last 72 hours.     No lab exists for component: CPKMB  No results found for: CHOL, CHOLX, CHLST, CHOLV, HDL, HDLP, LDL, LDLC, DLDLP, TGLX, TRIGL, TRIGP, CHHD, CHHDX  No results found for: [facility]  Lab Results   Component Value Date/Time    Color YELLOW/STRAW 09/08/2019 06:38 PM    Appearance CLEAR 09/08/2019 06:38 PM    Specific gravity 1.008 09/08/2019 06:38 PM    pH (UA) 7.5 09/08/2019 06:38 PM    Protein NEGATIVE  09/08/2019 06:38 PM    Glucose NEGATIVE  09/08/2019 06:38 PM    Ketone NEGATIVE  09/08/2019 06:38 PM    Bilirubin NEGATIVE  09/08/2019 06:38 PM    Urobilinogen 0.2 09/08/2019 06:38 PM    Nitrites NEGATIVE  09/08/2019 06:38 PM    Leukocyte Esterase NEGATIVE  09/08/2019 06:38 PM    Epithelial cells FEW 09/08/2019 06:38 PM    Bacteria NEGATIVE  09/08/2019 06:38 PM    WBC 0-4 09/08/2019 06:38 PM    RBC 0-5 09/08/2019 06:38 PM         Medications Reviewed:     Current Facility-Administered Medications   Medication Dose Route Frequency    piperacillin-tazobactam (ZOSYN) 3.375 g in 0.9% sodium chloride (MBP/ADV) 100 mL  3.375 g IntraVENous Q8H    sodium chloride (NS) flush 5-10 mL  5-10 mL IntraVENous PRN    sodium chloride (NS) flush 5-40 mL  5-40 mL IntraVENous Q8H    sodium chloride (NS) flush 5-40 mL  5-40 mL IntraVENous PRN    ondansetron (ZOFRAN) injection 4 mg  4 mg IntraVENous Q4H PRN    HYDROmorphone (PF) (DILAUDID) injection 0.5 mg  0.5 mg IntraVENous Q4H PRN    acetaminophen (TYLENOL) solution 650 mg  650 mg Oral Q4H PRN    mesalamine DR (ASACOL HD) tablet 1,600 mg  1,600 mg Oral TID    escitalopram oxalate (LEXAPRO) tablet 20 mg  20 mg Oral DAILY    ferrous sulfate tablet 325 mg  1 Tab Oral DAILY WITH BREAKFAST     ______________________________________________________________________  EXPECTED LENGTH OF STAY: 4d 19h  ACTUAL LENGTH OF STAY:          5                 Riley Montes De Oca MD     Patient's emergency contacts:  Extended Emergency Contact Information  Primary Emergency Contact: [place] [address] Phone: [phone]  Mobile Phone: [phone]  Relation: Mother

## 2019-09-14 LAB
BACTERIA SPEC CULT: NORMAL
BACTERIA SPEC CULT: NORMAL
SERVICE CMNT-IMP: NORMAL
SERVICE CMNT-IMP: NORMAL

## 2019-09-14 PROCEDURE — 74011250636 HC RX REV CODE- 250/636: Performed by: INTERNAL MEDICINE

## 2019-09-14 PROCEDURE — 74011000258 HC RX REV CODE- 258: Performed by: INTERNAL MEDICINE

## 2019-09-14 PROCEDURE — 74011250637 HC RX REV CODE- 250/637: Performed by: INTERNAL MEDICINE

## 2019-09-14 PROCEDURE — 65270000029 HC RM PRIVATE

## 2019-09-14 RX ADMIN — PIPERACILLIN SODIUM,TAZOBACTAM SODIUM 3.38 G: 3; .375 INJECTION, POWDER, FOR SOLUTION INTRAVENOUS at 23:43

## 2019-09-14 RX ADMIN — MESALAMINE 1600 MG: 800 TABLET, DELAYED RELEASE ORAL at 08:00

## 2019-09-14 RX ADMIN — Medication 10 ML: at 07:05

## 2019-09-14 RX ADMIN — PIPERACILLIN SODIUM,TAZOBACTAM SODIUM 3.38 G: 3; .375 INJECTION, POWDER, FOR SOLUTION INTRAVENOUS at 15:39

## 2019-09-14 RX ADMIN — MESALAMINE 1600 MG: 800 TABLET, DELAYED RELEASE ORAL at 15:39

## 2019-09-14 RX ADMIN — OXYCODONE AND ACETAMINOPHEN 1 TABLET: 5; 325 TABLET ORAL at 00:52

## 2019-09-14 RX ADMIN — OXYCODONE AND ACETAMINOPHEN 1 TABLET: 5; 325 TABLET ORAL at 14:24

## 2019-09-14 RX ADMIN — OXYCODONE AND ACETAMINOPHEN 1 TABLET: 5; 325 TABLET ORAL at 20:42

## 2019-09-14 RX ADMIN — MESALAMINE 1600 MG: 800 TABLET, DELAYED RELEASE ORAL at 23:43

## 2019-09-14 RX ADMIN — PIPERACILLIN SODIUM,TAZOBACTAM SODIUM 3.38 G: 3; .375 INJECTION, POWDER, FOR SOLUTION INTRAVENOUS at 07:04

## 2019-09-14 RX ADMIN — OXYCODONE AND ACETAMINOPHEN 1 TABLET: 5; 325 TABLET ORAL at 07:03

## 2019-09-14 RX ADMIN — FERROUS SULFATE TAB 325 MG (65 MG ELEMENTAL FE) 325 MG: 325 (65 FE) TAB at 07:04

## 2019-09-14 RX ADMIN — Medication 10 ML: at 23:45

## 2019-09-14 RX ADMIN — Medication 10 ML: at 14:25

## 2019-09-14 RX ADMIN — ACETAMINOPHEN 650 MG: 650 SOLUTION ORAL at 23:43

## 2019-09-14 RX ADMIN — ESCITALOPRAM OXALATE 20 MG: 10 TABLET ORAL at 08:00

## 2019-09-14 NOTE — PROGRESS NOTES
Infectious Diseases Progress Note    Antibiotic Summary:  Zosyn   -- present      Subjective:     Pain is better; still with diarrhea    Objective:     Vitals:   Visit Vitals  /71 (BP 1 Location: Right arm, BP Patient Position: At rest)   Pulse 89   Temp 98 °F (36.7 °C)   Resp 16   Ht 5' 5\" (1.651 m)   Wt 67.9 kg (149 lb 11.1 oz)   LMP 2019   SpO2 100%   Breastfeeding? No   BMI 24.91 kg/m²        Tmax:  Temp (24hrs), Av.4 °F (36.9 °C), Min:98 °F (36.7 °C), Max:98.9 °F (37.2 °C)      Exam:  General appearance: alert, no distress  Lungs: clear to auscultation bilaterally  Heart: regular rate and rhythm  Abdomen: soft, tender right mid and lower abdomen. Bowel sounds normal. No masses,  no organomegaly  Skin: no rash    IV Lines: peripheral    Labs:    Recent Labs     19  0210   WBC 6.2   HGB 10.2*      BUN 7   CREA 0.56       Assessment:     1. Active Crohns with enteroenteric fistulae and abdominopelvic abscess -- improved; note plans for surgery next week    Plan:     1. Continue Zosyn      I'll check the patient again on Monday. Please call if problems arise over the weekend.     Genella Moritz., MD

## 2019-09-14 NOTE — PROGRESS NOTES
Problem: Pain  Goal: *Control of Pain  Outcome: Progressing Towards Goal     Problem: Falls - Risk of  Goal: *Absence of Falls  Description  Document Reginald Pittman Fall Risk and appropriate interventions in the flowsheet.   Outcome: Progressing Towards Goal  Note:   Fall Risk Interventions:            Medication Interventions: Teach patient to arise slowly

## 2019-09-14 NOTE — PROGRESS NOTES
Bedside and Verbal shift change report given to Anh Sams (oncoming nurse) by Valjean Fleischer (offgoing nurse). Report included the following information SBAR, Kardex, ED Summary, Procedure Summary, Intake/Output, MAR and Recent Results.

## 2019-09-14 NOTE — PROGRESS NOTES
Hospitalist Progress Note  Merlinda Gross, MD  Answering service: [phone] OR [phone] from in house phone        Date of Service:  2019  NAME:  Nii Manning  :  1995  MRN:  [id number]  PCP: Royal Griffiths MD    Chief Complaint:   Chief Complaint   Patient presents with    Inflammatory Bowel Disease    Abdominal Pain    Fever    Melena         Admission Summary:     Nii Manning is a 26 y.o. female who states that she was diagnosed with Crohns disease at the age of 17. Patient states that two days ago, she started having bloody diarrhea, that has been ongoing about twice a day. She states that she also started having achy pain continuously in the right lower quadrant, which would sometimes intensify to a sharp transient pain. On the night of , patient felt sweaty and clammy, but did not take her temperature. She did take her temperature in the morning of  and found it to be 101°F. Later in the day, Temperature was even higher at 102.4°F. Patient then decided to come to the hospital. Patient denies any melena. She has been nauseous, but has not had any vomiting. .    Interval history / Subjective:     Patient is seen and examined at bedside. She feels ok. Tolerated diet. Pain still present but intermittent. She had 6  loose watery bowel movement. No nausea or vomiting. Discussed with Dr. Amber Tinsley yesterday, planning for surgery on Monday evening      Assessment & Plan:     Sepsis with intra-abdominal abscess - improving   Secondary to Crohn's disease    - CT abd: Marked inflammatory changes involving the distal small bowel loops in the right lower quadrant in keeping with history of Crohn disease.  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   -  Patient meets sepsis criteria with tachycardia and fever on poa but lactic acid levels are normal.  - IR consulted for abscess drainage but it was not done due ot possibility of adhesion  -  Colorectal Surgery on board. Diet advanced to regular, tolerating well  - stool cx positive for P. Shigelloides   - ID on board  -  c/w IV zosyn. - Rpt CT abd 9/12: Extensive lower quadrant inflammatory process tethering multiple loops of distal small bowel consistent with clinical history of Crohn's disease and multiple forming enteroenteric fistulas  - possible surgery on Monday  per Dr. 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 Crohns disease.   - Currently on humira - outpt   - c/w asacol   - GI following    Depression- lexapro     Code status: Full Code  DVT prophylaxis: mechanical prophylaxis  Care Plan discussed with: Patient/Family  Disposition: Home w/Family and TBD. Hospital Problems  Date Reviewed: 6/12/2019          Codes Class Noted POA    * (Principal) Abscess of abdominal cavity ([facility] [address] ICD-10-CM: K65.1  ICD-9-CM: 567.22  9/8/2019 Yes        Crohn disease ([facility] [address] ICD-10-CM: K50.90  ICD-9-CM: 555.9  12/19/2016 Yes                Review of Systems:   A comprehensive review of systems was negative except for that written in the HPI. Physical Examination:     Visit Vitals  /70 (BP 1 Location: Left arm, BP Patient Position: At rest)   Pulse 68   Temp 97.9 °F (36.6 °C)   Resp 16   Ht 5' 5\" (1.651 m)   Wt 67.9 kg (149 lb 11.1 oz)   SpO2 100%   Breastfeeding? No   BMI 24.91 kg/m²     General:  Alert, cooperative, no distress, appears stated age. Head:  Normocephalic, without obvious abnormality, atraumatic. Lungs:   Clear to auscultation bilaterally. Chest wall:  No tenderness or deformity. Heart:  Regular rate and rhythm, S1, S2 normal, no murmur, click, rub or gallop. Abdomen:   Right lower quadrant tenderness. Bowel sounds normal. No masses,  No organomegaly. Extremities: Extremities normal, atraumatic, no cyanosis or edema. Pulses: 2+ and symmetric all extremities.    Lymph nodes: Cervical, supraclavicular, and axillary nodes normal.   Neurologic: CNII-XII intact. Normal strength, sensation and r          Vital Signs:    Last 24hrs VS reviewed since prior progress note. Most recent are:    Visit Vitals  /70 (BP 1 Location: Left arm, BP Patient Position: At rest)   Pulse 68   Temp 97.9 °F (36.6 °C)   Resp 16   Ht 5' 5\" (1.651 m)   Wt 67.9 kg (149 lb 11.1 oz)   SpO2 100%   Breastfeeding? No   BMI 24.91 kg/m²         Intake/Output Summary (Last 24 hours) at 2019 1344  Last data filed at 2019 0800  Gross per 24 hour   Intake 2564 ml   Output    Net 2564 ml        Tmax:  Temp (24hrs), Av °F (36.7 °C), Min:97.9 °F (36.6 °C), Max:98 °F (36.7 °C)      Data Review:   Data reviewed by myself:  Ct Abd Pelv W Cont    Result Date: 2019  EXAM:  CT abdomen pelvis with contrast INDICATION: Abdominal pain and fever. Inflammatory bowel disease. COMPARISON: None. TECHNIQUE: Helical CT of the abdomen  and pelvis  following the uneventful intravenous administration of nonionic contrast.  Coronal and sagittal reformats are performed. CT dose reduction was achieved through use of a standardized protocol tailored for this examination and automatic exposure control for dose modulation. Adaptive statistical iterative reconstruction (ASIR) was utilized. FINDINGS: The visualized lung bases demonstrate no mass or consolidation. The heart size is normal. There is no pericardial or pleural effusion. The liver, spleen, pancreas, and adrenal glands are normal. The gall bladder is present  without intra- or extra-hepatic biliary dilatation. The kidneys are symmetric without hydronephrosis. There is marked inflammation involving small bowel loops in the right lower abdomen where there is distal ileum thickening and adjacent inflammation as well as mucosal hyperenhancement.  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 Enhancing but nonenlarged right lower mesenteric lymph nodes are likely reactive. There are no dilated bowel loops. The appendix is unremarkable. There are no enlarged lymph nodes. There is no free air. The urinary bladder is normal.  There is no pelvic mass. An IUD is present. The bony structures are age-appropriate. IMPRESSION: Marked inflammatory changes involving the distal small bowel loops in the right lower quadrant in keeping with history of Crohn disease. 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 No bowel obstruction. Normal appendix. Xr Chest Port    Result Date: 9/8/2019  EXAM:  CR chest portable INDICATION: Fever. Abdominal pain. COMPARISON: None. TECHNIQUE: Portable AP upright chest view at 1956 hours FINDINGS: Cardiac monitoring wires overlie the thorax. The cardiomediastinal contours are within normal limits. The lungs and pleural spaces are clear. There is no pneumothorax. The bones and upper abdomen are unremarkable. IMPRESSION: No acute process.      No results found for: SDES  Lab Results   Component Value Date/Time    Culture result: NO GROWTH 6 DAYS 09/08/2019 06:25 PM    Culture result: NO GROWTH 6 DAYS 09/08/2019 06:25 PM     All Micro Results     Procedure Component Value Units Date/Time    CULTURE, BLOOD [[id number]] Collected:  09/08/19 1825    Order Status:  Completed Specimen:  Blood Updated:  09/14/19 0610     Special Requests: NO SPECIAL REQUESTS        Culture result: NO GROWTH 6 DAYS       CULTURE, BLOOD [[id number]] Collected:  09/08/19 1825    Order Status:  Completed Specimen:  Blood Updated:  09/14/19 0610     Special Requests: NO SPECIAL REQUESTS        Culture result: NO GROWTH 6 DAYS       CULTURE, STOOL [[id number]]  (Abnormal) Collected:  09/09/19 1114    Order Status:  Completed Specimen:  Stool Updated:  09/11/19 1553     Shigella species, DNA NEGATIVE Campylobacter species, DNA NEGATIVE         Vibrio species, DNA NEGATIVE         Enterotoxigen E Coli, DNA NEGATIVE         Shiga toxin producing, DNA NEGATIVE         Salmonella species, DNA NEGATIVE         P. shigelloides, DNA POSITIVE        Y. enterocolitica, DNA NEGATIVE        C. DIFFICILE AG & TOXIN A/B [[id number]] Collected:  09/10/19 1114    Order Status:  Completed Specimen:  Stool Updated:  09/11/19 1204     [address] ANTIGEN NEGATIVE         C. difficile toxin NEGATIVE         INTERPRETATION       NEGATIVE FOR TOXIGENIC C. DIFFICILE          OVA & PARASITES, STOOL [[id number]] Collected:  09/10/19 1114    Order Status:  Completed Specimen:  Stool Updated:  09/10/19 1145    URINE CULTURE HOLD SAMPLE [[id number]] Collected:  09/08/19 1838    Order Status:  Completed Specimen:  Urine from Serum Updated:  09/08/19 1846     Urine culture hold       URINE ON HOLD IN MICROBIOLOGY DEPT FOR 3 DAYS. IF UNPRESERVED URINE IS SUBMITTED, IT CANNOT BE USED FOR ADDITIONAL TESTING AFTER 24 HRS, RECOLLECTION WILL BE REQUIRED. Labs: reviewed by myself. Recent Labs     09/12/19 0210   WBC 6.2   HGB 10.2*   HCT 33.4*        Recent Labs     09/12/19 0210      K 3.9      CO2 28   BUN 7   CREA 0.56   *   CA 8.5     No results for input(s): SGOT, GPT, ALT, AP, TBIL, TBILI, TP, ALB, GLOB, GGT, AML, LPSE in the last 72 hours. No lab exists for component: AMYP, HLPSE  No results for input(s): INR, PTP, APTT in the last 72 hours. No lab exists for component: INREXT, INREXT   No results for input(s): FE, TIBC, PSAT, FERR in the last 72 hours. No results found for: FOL, RBCF   No results for input(s): PH, PCO2, PO2 in the last 72 hours. No results for input(s): CPK, CKNDX, TROIQ in the last 72 hours.     No lab exists for component: CPKMB  No results found for: CHOL, CHOLX, CHLST, CHOLV, HDL, HDLP, LDL, LDLC, DLDLP, TGLX, TRIGL, TRIGP, CHHD, CHHDX  No results found for: GLUCPOC  Lab Results   Component Value Date/Time    Color YELLOW/STRAW 09/08/2019 06:38 PM    Appearance CLEAR 09/08/2019 06:38 PM    Specific gravity 1.008 09/08/2019 06:38 PM    pH (UA) 7.5 09/08/2019 06:38 PM    Protein NEGATIVE  09/08/2019 06:38 PM    Glucose NEGATIVE  09/08/2019 06:38 PM    Ketone NEGATIVE  09/08/2019 06:38 PM    Bilirubin NEGATIVE  09/08/2019 06:38 PM    Urobilinogen 0.2 09/08/2019 06:38 PM    Nitrites NEGATIVE  09/08/2019 06:38 PM    Leukocyte Esterase NEGATIVE  09/08/2019 06:38 PM    Epithelial cells FEW 09/08/2019 06:38 PM    Bacteria NEGATIVE  09/08/2019 06:38 PM    WBC 0-4 09/08/2019 06:38 PM    RBC 0-5 09/08/2019 06:38 PM         Medications Reviewed:     Current Facility-Administered Medications   Medication Dose Route Frequency    oxyCODONE-acetaminophen (PERCOCET) 5-325 mg per tablet 1 Tab  1 Tab Oral Q6H PRN    piperacillin-tazobactam (ZOSYN) 3.375 g in 0.9% sodium chloride (MBP/ADV) 100 mL  3.375 g IntraVENous Q8H    sodium chloride (NS) flush 5-10 mL  5-10 mL IntraVENous PRN    sodium chloride (NS) flush 5-40 mL  5-40 mL IntraVENous Q8H    sodium chloride (NS) flush 5-40 mL  5-40 mL IntraVENous PRN    ondansetron (ZOFRAN) injection 4 mg  4 mg IntraVENous Q4H PRN    acetaminophen (TYLENOL) solution 650 mg  650 mg Oral Q4H PRN    mesalamine DR (ASACOL HD) tablet 1,600 mg  1,600 mg Oral TID    escitalopram oxalate (LEXAPRO) tablet 20 mg  20 mg Oral DAILY    ferrous sulfate tablet 325 mg  1 Tab Oral DAILY WITH BREAKFAST     ______________________________________________________________________  EXPECTED LENGTH OF STAY: 4d 19h  ACTUAL LENGTH OF STAY:          6                 Laurent Lino MD     Patient's emergency contacts:  Extended Emergency Contact Information  Primary Emergency Contact: [place] Phone: [phone]  Mobile Phone: [phone]  Relation: Mother

## 2019-09-15 PROCEDURE — 74011250636 HC RX REV CODE- 250/636: Performed by: INTERNAL MEDICINE

## 2019-09-15 PROCEDURE — 65270000029 HC RM PRIVATE

## 2019-09-15 PROCEDURE — 74011250637 HC RX REV CODE- 250/637: Performed by: INTERNAL MEDICINE

## 2019-09-15 PROCEDURE — 74011000258 HC RX REV CODE- 258: Performed by: INTERNAL MEDICINE

## 2019-09-15 RX ORDER — SODIUM CHLORIDE 9 MG/ML
75 INJECTION, SOLUTION INTRAVENOUS CONTINUOUS
Status: DISCONTINUED | OUTPATIENT
Start: 2019-09-16 | End: 2019-09-21 | Stop reason: HOSPADM

## 2019-09-15 RX ORDER — ACETAMINOPHEN 325 MG/1
650 TABLET ORAL
Status: DISCONTINUED | OUTPATIENT
Start: 2019-09-15 | End: 2019-09-21 | Stop reason: HOSPADM

## 2019-09-15 RX ADMIN — OXYCODONE AND ACETAMINOPHEN 1 TABLET: 5; 325 TABLET ORAL at 14:20

## 2019-09-15 RX ADMIN — PIPERACILLIN SODIUM,TAZOBACTAM SODIUM 3.38 G: 3; .375 INJECTION, POWDER, FOR SOLUTION INTRAVENOUS at 14:20

## 2019-09-15 RX ADMIN — Medication 10 ML: at 08:40

## 2019-09-15 RX ADMIN — MESALAMINE 1600 MG: 800 TABLET, DELAYED RELEASE ORAL at 15:38

## 2019-09-15 RX ADMIN — OXYCODONE AND ACETAMINOPHEN 1 TABLET: 5; 325 TABLET ORAL at 08:33

## 2019-09-15 RX ADMIN — MESALAMINE 1600 MG: 800 TABLET, DELAYED RELEASE ORAL at 20:28

## 2019-09-15 RX ADMIN — PIPERACILLIN SODIUM,TAZOBACTAM SODIUM 3.38 G: 3; .375 INJECTION, POWDER, FOR SOLUTION INTRAVENOUS at 22:31

## 2019-09-15 RX ADMIN — FERROUS SULFATE TAB 325 MG (65 MG ELEMENTAL FE) 325 MG: 325 (65 FE) TAB at 08:33

## 2019-09-15 RX ADMIN — MESALAMINE 1600 MG: 800 TABLET, DELAYED RELEASE ORAL at 08:33

## 2019-09-15 RX ADMIN — PIPERACILLIN SODIUM,TAZOBACTAM SODIUM 3.38 G: 3; .375 INJECTION, POWDER, FOR SOLUTION INTRAVENOUS at 08:33

## 2019-09-15 RX ADMIN — Medication 10 ML: at 14:20

## 2019-09-15 RX ADMIN — ESCITALOPRAM OXALATE 20 MG: 10 TABLET ORAL at 08:33

## 2019-09-15 RX ADMIN — OXYCODONE AND ACETAMINOPHEN 1 TABLET: 5; 325 TABLET ORAL at 20:28

## 2019-09-15 RX ADMIN — OXYCODONE AND ACETAMINOPHEN 1 TABLET: 5; 325 TABLET ORAL at 02:29

## 2019-09-15 RX ADMIN — ACETAMINOPHEN 650 MG: 325 TABLET, FILM COATED ORAL at 18:00

## 2019-09-15 RX ADMIN — Medication 10 ML: at 22:31

## 2019-09-15 NOTE — PROGRESS NOTES
Bedside and Verbal shift change report given to Ivan Solo (oncoming nurse) by Otis Almanza (offgoing nurse). Report included the following information SBAR, Kardex, Intake/Output, MAR, Accordion, Recent Results, Med Rec Status and Quality Measures.

## 2019-09-15 NOTE — PROGRESS NOTES
Problem: Pain  Goal: *Control of Pain  Outcome: Progressing Towards Goal     Problem: Falls - Risk of  Goal: *Absence of Falls  Description  Document Rashel Pickett Fall Risk and appropriate interventions in the flowsheet. Outcome: Progressing Towards Goal  Note:   Fall Risk Interventions:            Medication Interventions: Teach patient to arise slowly                   Problem: Patient Education: Go to Patient Education Activity  Goal: Patient/Family Education  Outcome: Progressing Towards Goal     Problem: Risk for Spread of Infection  Goal: Prevent transmission of infectious organism to others  Description  Prevent the transmission of infectious organisms to other patients, staff members, and visitors.   Outcome: Progressing Towards Goal     Problem: Patient Education:  Go to Education Activity  Goal: Patient/Family Education  Outcome: Progressing Towards Goal

## 2019-09-15 NOTE — PROGRESS NOTES
CRS Note    Plan is for surgery tomorrow afternoon. NPO p MN. Dr. Justice Herrera discussed surgery with patient earlier today.     Diann Nissen, MD

## 2019-09-15 NOTE — PROGRESS NOTES
Hospitalist Progress Note  Dm Mclain MD  Answering service: [phone] from in house phone        Date of Service:  9/15/2019  NAME:  Mac Gandhi  :  1995  MRN:  [id number]  PCP: Jina Leigh MD    Chief Complaint:   Chief Complaint   Patient presents with    Inflammatory Bowel Disease    Abdominal Pain    Fever    Melena         Admission Summary:     Mac Gandhi is a 26 y.o. female who states that she was diagnosed with Crohns disease at the age of 17. Patient states that two days ago, she started having bloody diarrhea, that has been ongoing about twice a day. She states that she also started having achy pain continuously in the right lower quadrant, which would sometimes intensify to a sharp transient pain. On the night of , patient felt sweaty and clammy, but did not take her temperature. She did take her temperature in the morning of  and found it to be 101°F. Later in the day, Temperature was even higher at 102.4°F. Patient then decided to come to the hospital. Patient denies any melena. She has been nauseous, but has not had any vomiting. .    Interval history / Subjective:     Patient is seen and examined at bedside. She feels ok. Pain still present but intermittent. She had 2-3 loose watery bowel movement. No nausea or vomiting. Discussed with Dr. Chava Parra on , planning for surgery on Monday evening      Assessment & Plan:     Sepsis with intra-abdominal abscess - improving   Secondary to Crohn's disease    - CT abd: Marked inflammatory changes involving the distal small bowel loops in the right lower quadrant in keeping with history of Crohn disease.  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   -  Patient meets sepsis criteria with tachycardia and fever on poa but lactic acid levels are normal.  - IR consulted for abscess drainage but it was not done due ot possibility of adhesion  -  Colorectal Surgery on board. Diet advanced to regular, tolerating well  - stool cx positive for P. Shigelloides   - ID on board  -  c/w IV zosyn. - Rpt CT abd 9/12: Extensive lower quadrant inflammatory process tethering multiple loops of distal small bowel consistent with clinical history of Crohn's disease and multiple forming enteroenteric fistulas  - possible surgery on Monday  per Dr. 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 Crohns disease.   - Currently on humira - outpt   - c/w asacol   - GI following    Depression- lexapro     Code status: Full Code  DVT prophylaxis: mechanical prophylaxis  Care Plan discussed with: Patient/Family  Disposition: Home w/Family and TBD. Hospital Problems  Date Reviewed: 6/12/2019          Codes Class Noted POA    * (Principal) Abscess of abdominal cavity ([facility] [address] ICD-10-CM: K65.1  ICD-9-CM: 567.22  9/8/2019 Yes        Crohn disease ([facility] [address] ICD-10-CM: K50.90  ICD-9-CM: 555.9  12/19/2016 Yes                Review of Systems:   A comprehensive review of systems was negative except for that written in the HPI. Physical Examination:     Visit Vitals  /72 (BP 1 Location: Right arm, BP Patient Position: At rest)   Pulse 74   Temp 98.7 °F (37.1 °C)   Resp 18   Ht 5' 5\" (1.651 m)   Wt 67.5 kg (148 lb 12.8 oz)   SpO2 98%   Breastfeeding? No   BMI 24.76 kg/m²     General:  Alert, cooperative, no distress, appears stated age. Head:  Normocephalic, without obvious abnormality, atraumatic. Lungs:   Clear to auscultation bilaterally. Chest wall:  No tenderness or deformity. Heart:  Regular rate and rhythm, S1, S2 normal, no murmur, click, rub or gallop. Abdomen:   Right lower quadrant tenderness. Bowel sounds normal. No masses,  No organomegaly. Extremities: Extremities normal, atraumatic, no cyanosis or edema. Pulses: 2+ and symmetric all extremities.    Lymph nodes: Cervical, supraclavicular, and axillary nodes normal.   Neurologic: CNII-XII intact. Normal strength, sensation and r          Vital Signs:    Last 24hrs VS reviewed since prior progress note. Most recent are:    Visit Vitals  /72 (BP 1 Location: Right arm, BP Patient Position: At rest)   Pulse 74   Temp 98.7 °F (37.1 °C)   Resp 18   Ht 5' 5\" (1.651 m)   Wt 67.5 kg (148 lb 12.8 oz)   SpO2 98%   Breastfeeding? No   BMI 24.76 kg/m²         Intake/Output Summary (Last 24 hours) at 9/15/2019 1324  Last data filed at 9/15/2019 0946  Gross per 24 hour   Intake 803 ml   Output    Net 803 ml        Tmax:  Temp (24hrs), Av.2 °F (36.8 °C), Min:97.6 °F (36.4 °C), Max:98.7 °F (37.1 °C)      Data Review:   Data reviewed by myself:  Ct Abd Pelv W Cont    Result Date: 2019  EXAM:  CT abdomen pelvis with contrast INDICATION: Abdominal pain and fever. Inflammatory bowel disease. COMPARISON: None. TECHNIQUE: Helical CT of the abdomen  and pelvis  following the uneventful intravenous administration of nonionic contrast.  Coronal and sagittal reformats are performed. CT dose reduction was achieved through use of a standardized protocol tailored for this examination and automatic exposure control for dose modulation. Adaptive statistical iterative reconstruction (ASIR) was utilized. FINDINGS: The visualized lung bases demonstrate no mass or consolidation. The heart size is normal. There is no pericardial or pleural effusion. The liver, spleen, pancreas, and adrenal glands are normal. The gall bladder is present  without intra- or extra-hepatic biliary dilatation. The kidneys are symmetric without hydronephrosis. There is marked inflammation involving small bowel loops in the right lower abdomen where there is distal ileum thickening and adjacent inflammation as well as mucosal hyperenhancement.  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 Enhancing but nonenlarged right lower mesenteric lymph nodes are likely reactive. There are no dilated bowel loops. The appendix is unremarkable. There are no enlarged lymph nodes. There is no free air. The urinary bladder is normal.  There is no pelvic mass. An IUD is present. The bony structures are age-appropriate. IMPRESSION: Marked inflammatory changes involving the distal small bowel loops in the right lower quadrant in keeping with history of Crohn disease. 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 No bowel obstruction. Normal appendix. Xr Chest Port    Result Date: 9/8/2019  EXAM:  CR chest portable INDICATION: Fever. Abdominal pain. COMPARISON: None. TECHNIQUE: Portable AP upright chest view at 1956 hours FINDINGS: Cardiac monitoring wires overlie the thorax. The cardiomediastinal contours are within normal limits. The lungs and pleural spaces are clear. There is no pneumothorax. The bones and upper abdomen are unremarkable. IMPRESSION: No acute process.      No results found for: SDES  Lab Results   Component Value Date/Time    Culture result: NO GROWTH 6 DAYS 09/08/2019 06:25 PM    Culture result: NO GROWTH 6 DAYS 09/08/2019 06:25 PM     All Micro Results     Procedure Component Value Units Date/Time    CULTURE, BLOOD [[id number]] Collected:  09/08/19 1825    Order Status:  Completed Specimen:  Blood Updated:  09/14/19 0610     Special Requests: NO SPECIAL REQUESTS        Culture result: NO GROWTH 6 DAYS       CULTURE, BLOOD [[id number]] Collected:  09/08/19 1825    Order Status:  Completed Specimen:  Blood Updated:  09/14/19 0610     Special Requests: NO SPECIAL REQUESTS        Culture result: NO GROWTH 6 DAYS       CULTURE, STOOL [[id number]]  (Abnormal) Collected:  09/09/19 1114    Order Status:  Completed Specimen:  Stool Updated:  09/11/19 1553     Shigella species, DNA NEGATIVE         Campylobacter species, DNA NEGATIVE         Vibrio species, DNA NEGATIVE         Enterotoxigen E Coli, DNA NEGATIVE         Shiga toxin producing, DNA NEGATIVE         Salmonella species, DNA NEGATIVE         P. shigelloides, DNA POSITIVE        Y. enterocolitica, DNA NEGATIVE        C. DIFFICILE AG & TOXIN A/B [[id number]] Collected:  09/10/19 1114    Order Status:  Completed Specimen:  Stool Updated:  09/11/19 1204     [address] [place] ANTIGEN NEGATIVE         C. difficile toxin NEGATIVE         INTERPRETATION       NEGATIVE FOR TOXIGENIC C. DIFFICILE          OVA & PARASITES, STOOL [[id number]] Collected:  09/10/19 1114    Order Status:  Completed Specimen:  Stool Updated:  09/10/19 1145    URINE CULTURE HOLD SAMPLE [[id number]] Collected:  09/08/19 1838    Order Status:  Completed Specimen:  Urine from Serum Updated:  09/08/19 1846     Urine culture hold       URINE ON HOLD IN MICROBIOLOGY DEPT FOR 3 DAYS. IF UNPRESERVED URINE IS SUBMITTED, IT CANNOT BE USED FOR ADDITIONAL TESTING AFTER 24 HRS, RECOLLECTION WILL BE REQUIRED. Labs: reviewed by myself. No results for input(s): WBC, HGB, HCT, PLT, HGBEXT, HCTEXT, PLTEXT, HGBEXT, HCTEXT, PLTEXT in the last 72 hours. No results for input(s): NA, K, CL, CO2, BUN, CREA, GLU, CA, MG, PHOS, URICA in the last 72 hours. No results for input(s): SGOT, GPT, ALT, AP, TBIL, TBILI, TP, ALB, GLOB, GGT, AML, LPSE in the last 72 hours. No lab exists for component: AMYP, HLPSE  No results for input(s): INR, PTP, APTT in the last 72 hours. No lab exists for component: INREXT, INREXT   No results for input(s): FE, TIBC, PSAT, FERR in the last 72 hours. No results found for: FOL, RBCF   No results for input(s): PH, PCO2, PO2 in the last 72 hours. No results for input(s): CPK, CKNDX, TROIQ in the last 72 hours.     No lab exists for component: CPKMB  No results found for: CHOL, CHOLX, CHLST, CHOLV, HDL, HDLP, LDL, LDLC, DLDLP, TGLX, TRIGL, TRIGP, CHHD, CHHDX  No results found for: [facility]  Lab Results   Component Value Date/Time    Color YELLOW/STRAW 09/08/2019 06:38 PM    Appearance CLEAR 09/08/2019 06:38 PM    Specific gravity 1.008 09/08/2019 06:38 PM    pH (UA) 7.5 09/08/2019 06:38 PM    Protein NEGATIVE  09/08/2019 06:38 PM    Glucose NEGATIVE  09/08/2019 06:38 PM    Ketone NEGATIVE  09/08/2019 06:38 PM    Bilirubin NEGATIVE  09/08/2019 06:38 PM    Urobilinogen 0.2 09/08/2019 06:38 PM    Nitrites NEGATIVE  09/08/2019 06:38 PM    Leukocyte Esterase NEGATIVE  09/08/2019 06:38 PM    Epithelial cells FEW 09/08/2019 06:38 PM    Bacteria NEGATIVE  09/08/2019 06:38 PM    WBC 0-4 09/08/2019 06:38 PM    RBC 0-5 09/08/2019 06:38 PM         Medications Reviewed:     Current Facility-Administered Medications   Medication Dose Route Frequency    acetaminophen (TYLENOL) tablet 650 mg  650 mg Oral Q6H PRN    oxyCODONE-acetaminophen (PERCOCET) 5-325 mg per tablet 1 Tab  1 Tab Oral Q6H PRN    piperacillin-tazobactam (ZOSYN) 3.375 g in 0.9% sodium chloride (MBP/ADV) 100 mL  3.375 g IntraVENous Q8H    sodium chloride (NS) flush 5-10 mL  5-10 mL IntraVENous PRN    sodium chloride (NS) flush 5-40 mL  5-40 mL IntraVENous Q8H    sodium chloride (NS) flush 5-40 mL  5-40 mL IntraVENous PRN    ondansetron (ZOFRAN) injection 4 mg  4 mg IntraVENous Q4H PRN    mesalamine DR (ASACOL HD) tablet 1,600 mg  1,600 mg Oral TID    escitalopram oxalate (LEXAPRO) tablet 20 mg  20 mg Oral DAILY    ferrous sulfate tablet 325 mg  1 Tab Oral DAILY WITH BREAKFAST     ______________________________________________________________________  EXPECTED LENGTH OF STAY: 4d 19h  ACTUAL LENGTH OF STAY:          7                 Angelia Jaffe MD     Patient's emergency contacts:  Extended Emergency Contact Information  Primary Emergency Contact: Savannah  Phone: [phone]  Mobile Phone: [phone]  Relation: Mother

## 2019-09-16 ENCOUNTER — ANESTHESIA EVENT (OUTPATIENT)
Dept: SURGERY | Age: 24
DRG: 853 | End: 2019-09-16
Payer: COMMERCIAL

## 2019-09-16 ENCOUNTER — ANESTHESIA (OUTPATIENT)
Dept: SURGERY | Age: 24
DRG: 853 | End: 2019-09-16
Payer: COMMERCIAL

## 2019-09-16 LAB
ANION GAP SERPL CALC-SCNC: 10 MMOL/L (ref 5–15)
BASOPHILS # BLD: 0.1 K/UL (ref 0–0.1)
BASOPHILS NFR BLD: 1 % (ref 0–1)
BUN SERPL-MCNC: 7 MG/DL (ref 6–20)
BUN/CREAT SERPL: 11 (ref 12–20)
CALCIUM SERPL-MCNC: 8.5 MG/DL (ref 8.5–10.1)
CHLORIDE SERPL-SCNC: 106 MMOL/L (ref 97–108)
CO2 SERPL-SCNC: 24 MMOL/L (ref 21–32)
CREAT SERPL-MCNC: 0.62 MG/DL (ref 0.55–1.02)
DIFFERENTIAL METHOD BLD: ABNORMAL
EOSINOPHIL # BLD: 0.2 K/UL (ref 0–0.4)
EOSINOPHIL NFR BLD: 4 % (ref 0–7)
ERYTHROCYTE [DISTWIDTH] IN BLOOD BY AUTOMATED COUNT: 12.6 % (ref 11.5–14.5)
GLUCOSE SERPL-MCNC: 80 MG/DL (ref 65–100)
HCT VFR BLD AUTO: 36.4 % (ref 35–47)
HGB BLD-MCNC: 11.1 G/DL (ref 11.5–16)
IMM GRANULOCYTES # BLD AUTO: 0 K/UL (ref 0–0.04)
IMM GRANULOCYTES NFR BLD AUTO: 0 % (ref 0–0.5)
LYMPHOCYTES # BLD: 0.6 K/UL (ref 0.8–3.5)
LYMPHOCYTES NFR BLD: 12 % (ref 12–49)
MCH RBC QN AUTO: 25.6 PG (ref 26–34)
MCHC RBC AUTO-ENTMCNC: 30.5 G/DL (ref 30–36.5)
MCV RBC AUTO: 84.1 FL (ref 80–99)
MONOCYTES # BLD: 0.7 K/UL (ref 0–1)
MONOCYTES NFR BLD: 14 % (ref 5–13)
NEUTS SEG # BLD: 3.7 K/UL (ref 1.8–8)
NEUTS SEG NFR BLD: 69 % (ref 32–75)
NRBC # BLD: 0 K/UL (ref 0–0.01)
NRBC BLD-RTO: 0 PER 100 WBC
PLATELET # BLD AUTO: 354 K/UL (ref 150–400)
PMV BLD AUTO: 9.4 FL (ref 8.9–12.9)
POTASSIUM SERPL-SCNC: 4.4 MMOL/L (ref 3.5–5.1)
RBC # BLD AUTO: 4.33 M/UL (ref 3.8–5.2)
RBC MORPH BLD: ABNORMAL
SODIUM SERPL-SCNC: 140 MMOL/L (ref 136–145)
WBC # BLD AUTO: 5.3 K/UL (ref 3.6–11)

## 2019-09-16 PROCEDURE — 77030031492 HC PRT ACC BLNT AIRSEAL CNMD -B: Performed by: COLON & RECTAL SURGERY

## 2019-09-16 PROCEDURE — 36415 COLL VENOUS BLD VENIPUNCTURE: CPT

## 2019-09-16 PROCEDURE — 74011250637 HC RX REV CODE- 250/637: Performed by: INTERNAL MEDICINE

## 2019-09-16 PROCEDURE — 77030040361 HC SLV COMPR DVT MDII -B: Performed by: COLON & RECTAL SURGERY

## 2019-09-16 PROCEDURE — 77030008771 HC TU NG SALEM SUMP -A: Performed by: ANESTHESIOLOGY

## 2019-09-16 PROCEDURE — 77030035277 HC OBTRTR BLDELSS DISP INTU -B: Performed by: COLON & RECTAL SURGERY

## 2019-09-16 PROCEDURE — 77030002996 HC SUT SLK J&J -A: Performed by: COLON & RECTAL SURGERY

## 2019-09-16 PROCEDURE — 77030020263 HC SOL INJ SOD CL0.9% LFCR 1000ML: Performed by: COLON & RECTAL SURGERY

## 2019-09-16 PROCEDURE — 65270000029 HC RM PRIVATE

## 2019-09-16 PROCEDURE — 77030035489 HC REDUCR CANN ENDOWR INTU -C: Performed by: COLON & RECTAL SURGERY

## 2019-09-16 PROCEDURE — 0DBH4ZZ EXCISION OF CECUM, PERCUTANEOUS ENDOSCOPIC APPROACH: ICD-10-PCS | Performed by: COLON & RECTAL SURGERY

## 2019-09-16 PROCEDURE — 77030020782 HC GWN BAIR PAWS FLX 3M -B

## 2019-09-16 PROCEDURE — 77030008684 HC TU ET CUF COVD -B: Performed by: ANESTHESIOLOGY

## 2019-09-16 PROCEDURE — 74011250637 HC RX REV CODE- 250/637: Performed by: COLON & RECTAL SURGERY

## 2019-09-16 PROCEDURE — 77030034133 HC APPL ENDOSCP FLX SPRY BAXT -C: Performed by: COLON & RECTAL SURGERY

## 2019-09-16 PROCEDURE — 74011250636 HC RX REV CODE- 250/636: Performed by: COLON & RECTAL SURGERY

## 2019-09-16 PROCEDURE — 76060000039 HC ANESTHESIA 4 TO 4.5 HR: Performed by: COLON & RECTAL SURGERY

## 2019-09-16 PROCEDURE — 0DBB4ZZ EXCISION OF ILEUM, PERCUTANEOUS ENDOSCOPIC APPROACH: ICD-10-PCS | Performed by: COLON & RECTAL SURGERY

## 2019-09-16 PROCEDURE — 0DQ84ZZ REPAIR SMALL INTESTINE, PERCUTANEOUS ENDOSCOPIC APPROACH: ICD-10-PCS | Performed by: COLON & RECTAL SURGERY

## 2019-09-16 PROCEDURE — 77030035279 HC SEAL VSL ENDOWR XI INTU -I2: Performed by: COLON & RECTAL SURGERY

## 2019-09-16 PROCEDURE — 87205 SMEAR GRAM STAIN: CPT

## 2019-09-16 PROCEDURE — 77030039266 HC ADH SKN EXOFIN S2SG -A: Performed by: COLON & RECTAL SURGERY

## 2019-09-16 PROCEDURE — 77030040830 HC CATH URETH FOL MDII -A: Performed by: COLON & RECTAL SURGERY

## 2019-09-16 PROCEDURE — 77030040260 HC STPLR RELD SUREFORM DVNCI INTU -C: Performed by: COLON & RECTAL SURGERY

## 2019-09-16 PROCEDURE — 76010000880 HC OR TIME 4 TO 4.5HR INTENSV - TIER 2: Performed by: COLON & RECTAL SURGERY

## 2019-09-16 PROCEDURE — 77030008756 HC TU IRR SUC STRY -B: Performed by: COLON & RECTAL SURGERY

## 2019-09-16 PROCEDURE — 8E0W4CZ ROBOTIC ASSISTED PROCEDURE OF TRUNK REGION, PERCUTANEOUS ENDOSCOPIC APPROACH: ICD-10-PCS | Performed by: COLON & RECTAL SURGERY

## 2019-09-16 PROCEDURE — 74011250636 HC RX REV CODE- 250/636: Performed by: NURSE ANESTHETIST, CERTIFIED REGISTERED

## 2019-09-16 PROCEDURE — 77030034667 HC ACC PLATFRM ENDO GELPNT AMR -E: Performed by: COLON & RECTAL SURGERY

## 2019-09-16 PROCEDURE — 86900 BLOOD TYPING SEROLOGIC ABO: CPT

## 2019-09-16 PROCEDURE — 88307 TISSUE EXAM BY PATHOLOGIST: CPT

## 2019-09-16 PROCEDURE — 80048 BASIC METABOLIC PNL TOTAL CA: CPT

## 2019-09-16 PROCEDURE — 77030002933 HC SUT MCRYL J&J -A: Performed by: COLON & RECTAL SURGERY

## 2019-09-16 PROCEDURE — 77030012893

## 2019-09-16 PROCEDURE — 77030040259 HC STPLR DEV SUREFORM DVNCI INTU -F: Performed by: COLON & RECTAL SURGERY

## 2019-09-16 PROCEDURE — 74011000258 HC RX REV CODE- 258: Performed by: COLON & RECTAL SURGERY

## 2019-09-16 PROCEDURE — 74011000250 HC RX REV CODE- 250: Performed by: COLON & RECTAL SURGERY

## 2019-09-16 PROCEDURE — 77030031139 HC SUT VCRL2 J&J -A: Performed by: COLON & RECTAL SURGERY

## 2019-09-16 PROCEDURE — 77030022704 HC SUT VLOC COVD -B: Performed by: COLON & RECTAL SURGERY

## 2019-09-16 PROCEDURE — 77030002966 HC SUT PDS J&J -A: Performed by: COLON & RECTAL SURGERY

## 2019-09-16 PROCEDURE — 77030011640 HC PAD GRND REM COVD -A: Performed by: COLON & RECTAL SURGERY

## 2019-09-16 PROCEDURE — 77030013533 HC SEAL FBRN TISSL RDYTUSE 10ML BAXT -E: Performed by: COLON & RECTAL SURGERY

## 2019-09-16 PROCEDURE — P9045 ALBUMIN (HUMAN), 5%, 250 ML: HCPCS | Performed by: NURSE ANESTHETIST, CERTIFIED REGISTERED

## 2019-09-16 PROCEDURE — 85025 COMPLETE CBC W/AUTO DIFF WBC: CPT

## 2019-09-16 PROCEDURE — 86923 COMPATIBILITY TEST ELECTRIC: CPT

## 2019-09-16 PROCEDURE — 77030016151 HC PROTCTR LNS DFOG COVD -B: Performed by: COLON & RECTAL SURGERY

## 2019-09-16 PROCEDURE — 74011250636 HC RX REV CODE- 250/636: Performed by: ANESTHESIOLOGY

## 2019-09-16 PROCEDURE — 74011250636 HC RX REV CODE- 250/636: Performed by: INTERNAL MEDICINE

## 2019-09-16 PROCEDURE — 76210000017 HC OR PH I REC 1.5 TO 2 HR: Performed by: COLON & RECTAL SURGERY

## 2019-09-16 PROCEDURE — 77030020703 HC SEAL CANN DISP INTU -B: Performed by: COLON & RECTAL SURGERY

## 2019-09-16 PROCEDURE — 74011000258 HC RX REV CODE- 258: Performed by: INTERNAL MEDICINE

## 2019-09-16 PROCEDURE — 77030026438 HC STYL ET INTUB CARD -A: Performed by: ANESTHESIOLOGY

## 2019-09-16 PROCEDURE — 77030018836 HC SOL IRR NACL ICUM -A: Performed by: COLON & RECTAL SURGERY

## 2019-09-16 PROCEDURE — 74011000250 HC RX REV CODE- 250: Performed by: NURSE ANESTHETIST, CERTIFIED REGISTERED

## 2019-09-16 RX ORDER — SODIUM CHLORIDE, SODIUM LACTATE, POTASSIUM CHLORIDE, CALCIUM CHLORIDE 600; 310; 30; 20 MG/100ML; MG/100ML; MG/100ML; MG/100ML
125 INJECTION, SOLUTION INTRAVENOUS CONTINUOUS
Status: DISCONTINUED | OUTPATIENT
Start: 2019-09-16 | End: 2019-09-16 | Stop reason: HOSPADM

## 2019-09-16 RX ORDER — EPHEDRINE SULFATE/0.9% NACL/PF 50 MG/5 ML
5 SYRINGE (ML) INTRAVENOUS AS NEEDED
Status: DISCONTINUED | OUTPATIENT
Start: 2019-09-16 | End: 2019-09-16 | Stop reason: HOSPADM

## 2019-09-16 RX ORDER — SODIUM CHLORIDE 0.9 % (FLUSH) 0.9 %
5-40 SYRINGE (ML) INJECTION EVERY 8 HOURS
Status: DISCONTINUED | OUTPATIENT
Start: 2019-09-16 | End: 2019-09-16 | Stop reason: HOSPADM

## 2019-09-16 RX ORDER — ONDANSETRON 2 MG/ML
INJECTION INTRAMUSCULAR; INTRAVENOUS AS NEEDED
Status: DISCONTINUED | OUTPATIENT
Start: 2019-09-16 | End: 2019-09-16 | Stop reason: HOSPADM

## 2019-09-16 RX ORDER — CELECOXIB 100 MG/1
200 CAPSULE ORAL ONCE
Status: COMPLETED | OUTPATIENT
Start: 2019-09-16 | End: 2019-09-16

## 2019-09-16 RX ORDER — KETOROLAC TROMETHAMINE 30 MG/ML
INJECTION, SOLUTION INTRAMUSCULAR; INTRAVENOUS
Status: DISPENSED
Start: 2019-09-16 | End: 2019-09-17

## 2019-09-16 RX ORDER — ONDANSETRON 2 MG/ML
4 INJECTION INTRAMUSCULAR; INTRAVENOUS AS NEEDED
Status: DISCONTINUED | OUTPATIENT
Start: 2019-09-16 | End: 2019-09-16 | Stop reason: HOSPADM

## 2019-09-16 RX ORDER — KETOROLAC TROMETHAMINE 30 MG/ML
15 INJECTION, SOLUTION INTRAMUSCULAR; INTRAVENOUS EVERY 6 HOURS
Status: DISPENSED | OUTPATIENT
Start: 2019-09-17 | End: 2019-09-17

## 2019-09-16 RX ORDER — ALVIMOPAN 12 MG/1
12 CAPSULE ORAL 2 TIMES DAILY
Status: DISCONTINUED | OUTPATIENT
Start: 2019-09-17 | End: 2019-09-18

## 2019-09-16 RX ORDER — GABAPENTIN 300 MG/1
600 CAPSULE ORAL ONCE
Status: COMPLETED | OUTPATIENT
Start: 2019-09-16 | End: 2019-09-16

## 2019-09-16 RX ORDER — MORPHINE SULFATE 10 MG/ML
2 INJECTION, SOLUTION INTRAMUSCULAR; INTRAVENOUS
Status: DISCONTINUED | OUTPATIENT
Start: 2019-09-16 | End: 2019-09-16 | Stop reason: HOSPADM

## 2019-09-16 RX ORDER — LIDOCAINE HYDROCHLORIDE 20 MG/ML
INJECTION, SOLUTION EPIDURAL; INFILTRATION; INTRACAUDAL; PERINEURAL
Status: DISCONTINUED | OUTPATIENT
Start: 2019-09-16 | End: 2019-09-16

## 2019-09-16 RX ORDER — FENTANYL CITRATE 50 UG/ML
50 INJECTION, SOLUTION INTRAMUSCULAR; INTRAVENOUS AS NEEDED
Status: DISCONTINUED | OUTPATIENT
Start: 2019-09-16 | End: 2019-09-16 | Stop reason: HOSPADM

## 2019-09-16 RX ORDER — OXYCODONE AND ACETAMINOPHEN 5; 325 MG/1; MG/1
1 TABLET ORAL AS NEEDED
Status: DISCONTINUED | OUTPATIENT
Start: 2019-09-16 | End: 2019-09-16 | Stop reason: HOSPADM

## 2019-09-16 RX ORDER — ENOXAPARIN SODIUM 100 MG/ML
40 INJECTION SUBCUTANEOUS EVERY 24 HOURS
Status: DISCONTINUED | OUTPATIENT
Start: 2019-09-16 | End: 2019-09-19

## 2019-09-16 RX ORDER — ACETAMINOPHEN 500 MG
1000 TABLET ORAL ONCE
Status: COMPLETED | OUTPATIENT
Start: 2019-09-16 | End: 2019-09-16

## 2019-09-16 RX ORDER — MIDAZOLAM HYDROCHLORIDE 1 MG/ML
1 INJECTION, SOLUTION INTRAMUSCULAR; INTRAVENOUS AS NEEDED
Status: DISCONTINUED | OUTPATIENT
Start: 2019-09-16 | End: 2019-09-16 | Stop reason: HOSPADM

## 2019-09-16 RX ORDER — SODIUM CHLORIDE 9 MG/ML
50 INJECTION, SOLUTION INTRAVENOUS CONTINUOUS
Status: DISCONTINUED | OUTPATIENT
Start: 2019-09-16 | End: 2019-09-16 | Stop reason: HOSPADM

## 2019-09-16 RX ORDER — LIDOCAINE HYDROCHLORIDE 20 MG/ML
INJECTION, SOLUTION EPIDURAL; INFILTRATION; INTRACAUDAL; PERINEURAL AS NEEDED
Status: DISCONTINUED | OUTPATIENT
Start: 2019-09-16 | End: 2019-09-16 | Stop reason: HOSPADM

## 2019-09-16 RX ORDER — FENTANYL CITRATE 50 UG/ML
25 INJECTION, SOLUTION INTRAMUSCULAR; INTRAVENOUS
Status: DISCONTINUED | OUTPATIENT
Start: 2019-09-16 | End: 2019-09-16 | Stop reason: HOSPADM

## 2019-09-16 RX ORDER — ALVIMOPAN 12 MG/1
12 CAPSULE ORAL ONCE
Status: COMPLETED | OUTPATIENT
Start: 2019-09-16 | End: 2019-09-16

## 2019-09-16 RX ORDER — MIDAZOLAM HYDROCHLORIDE 1 MG/ML
INJECTION, SOLUTION INTRAMUSCULAR; INTRAVENOUS AS NEEDED
Status: DISCONTINUED | OUTPATIENT
Start: 2019-09-16 | End: 2019-09-16 | Stop reason: HOSPADM

## 2019-09-16 RX ORDER — ACETAMINOPHEN 325 MG/1
650 TABLET ORAL ONCE
Status: DISCONTINUED | OUTPATIENT
Start: 2019-09-16 | End: 2019-09-16 | Stop reason: DRUGHIGH

## 2019-09-16 RX ORDER — KETAMINE HYDROCHLORIDE 10 MG/ML
INJECTION, SOLUTION INTRAMUSCULAR; INTRAVENOUS AS NEEDED
Status: DISCONTINUED | OUTPATIENT
Start: 2019-09-16 | End: 2019-09-16 | Stop reason: HOSPADM

## 2019-09-16 RX ORDER — HYDROMORPHONE HYDROCHLORIDE 1 MG/ML
0.2 INJECTION, SOLUTION INTRAMUSCULAR; INTRAVENOUS; SUBCUTANEOUS
Status: DISCONTINUED | OUTPATIENT
Start: 2019-09-16 | End: 2019-09-16 | Stop reason: HOSPADM

## 2019-09-16 RX ORDER — LIDOCAINE HYDROCHLORIDE 10 MG/ML
0.1 INJECTION, SOLUTION EPIDURAL; INFILTRATION; INTRACAUDAL; PERINEURAL AS NEEDED
Status: DISCONTINUED | OUTPATIENT
Start: 2019-09-16 | End: 2019-09-16 | Stop reason: HOSPADM

## 2019-09-16 RX ORDER — PROPOFOL 10 MG/ML
INJECTION, EMULSION INTRAVENOUS AS NEEDED
Status: DISCONTINUED | OUTPATIENT
Start: 2019-09-16 | End: 2019-09-16 | Stop reason: HOSPADM

## 2019-09-16 RX ORDER — SODIUM CHLORIDE 9 MG/ML
1000 INJECTION, SOLUTION INTRAVENOUS CONTINUOUS
Status: DISCONTINUED | OUTPATIENT
Start: 2019-09-16 | End: 2019-09-16 | Stop reason: HOSPADM

## 2019-09-16 RX ORDER — FENTANYL CITRATE 50 UG/ML
INJECTION, SOLUTION INTRAMUSCULAR; INTRAVENOUS AS NEEDED
Status: DISCONTINUED | OUTPATIENT
Start: 2019-09-16 | End: 2019-09-16 | Stop reason: HOSPADM

## 2019-09-16 RX ORDER — MIDAZOLAM HYDROCHLORIDE 1 MG/ML
0.5 INJECTION, SOLUTION INTRAMUSCULAR; INTRAVENOUS
Status: DISCONTINUED | OUTPATIENT
Start: 2019-09-16 | End: 2019-09-16 | Stop reason: HOSPADM

## 2019-09-16 RX ORDER — DIPHENHYDRAMINE HYDROCHLORIDE 50 MG/ML
12.5 INJECTION, SOLUTION INTRAMUSCULAR; INTRAVENOUS AS NEEDED
Status: DISCONTINUED | OUTPATIENT
Start: 2019-09-16 | End: 2019-09-16 | Stop reason: HOSPADM

## 2019-09-16 RX ORDER — ONDANSETRON 4 MG/1
4 TABLET, ORALLY DISINTEGRATING ORAL
Status: DISCONTINUED | OUTPATIENT
Start: 2019-09-16 | End: 2019-09-21 | Stop reason: HOSPADM

## 2019-09-16 RX ORDER — ACETAMINOPHEN 500 MG
1000 TABLET ORAL EVERY 6 HOURS
Status: DISCONTINUED | OUTPATIENT
Start: 2019-09-17 | End: 2019-09-21 | Stop reason: HOSPADM

## 2019-09-16 RX ORDER — DEXMEDETOMIDINE HYDROCHLORIDE 100 UG/ML
INJECTION, SOLUTION INTRAVENOUS AS NEEDED
Status: DISCONTINUED | OUTPATIENT
Start: 2019-09-16 | End: 2019-09-16 | Stop reason: HOSPADM

## 2019-09-16 RX ORDER — DEXAMETHASONE SODIUM PHOSPHATE 4 MG/ML
INJECTION, SOLUTION INTRA-ARTICULAR; INTRALESIONAL; INTRAMUSCULAR; INTRAVENOUS; SOFT TISSUE AS NEEDED
Status: DISCONTINUED | OUTPATIENT
Start: 2019-09-16 | End: 2019-09-16 | Stop reason: HOSPADM

## 2019-09-16 RX ORDER — LIDOCAINE HYDROCHLORIDE ANHYDROUS AND DEXTROSE MONOHYDRATE .8; 5 G/100ML; G/100ML
1 INJECTION, SOLUTION INTRAVENOUS CONTINUOUS
Status: DISPENSED | OUTPATIENT
Start: 2019-09-16 | End: 2019-09-18

## 2019-09-16 RX ORDER — HYDROMORPHONE HYDROCHLORIDE 1 MG/ML
0.5 INJECTION, SOLUTION INTRAMUSCULAR; INTRAVENOUS; SUBCUTANEOUS
Status: DISCONTINUED | OUTPATIENT
Start: 2019-09-16 | End: 2019-09-17

## 2019-09-16 RX ORDER — SODIUM CHLORIDE 0.9 % (FLUSH) 0.9 %
5-40 SYRINGE (ML) INJECTION AS NEEDED
Status: DISCONTINUED | OUTPATIENT
Start: 2019-09-16 | End: 2019-09-16 | Stop reason: HOSPADM

## 2019-09-16 RX ORDER — SUCCINYLCHOLINE CHLORIDE 20 MG/ML
INJECTION INTRAMUSCULAR; INTRAVENOUS AS NEEDED
Status: DISCONTINUED | OUTPATIENT
Start: 2019-09-16 | End: 2019-09-16 | Stop reason: HOSPADM

## 2019-09-16 RX ORDER — SODIUM CHLORIDE, SODIUM LACTATE, POTASSIUM CHLORIDE, CALCIUM CHLORIDE 600; 310; 30; 20 MG/100ML; MG/100ML; MG/100ML; MG/100ML
75 INJECTION, SOLUTION INTRAVENOUS CONTINUOUS
Status: DISCONTINUED | OUTPATIENT
Start: 2019-09-16 | End: 2019-09-16 | Stop reason: HOSPADM

## 2019-09-16 RX ORDER — LIDOCAINE HYDROCHLORIDE ANHYDROUS AND DEXTROSE MONOHYDRATE .8; 5 G/100ML; G/100ML
INJECTION, SOLUTION INTRAVENOUS
Status: DISCONTINUED | OUTPATIENT
Start: 2019-09-16 | End: 2019-09-16 | Stop reason: HOSPADM

## 2019-09-16 RX ORDER — SODIUM CHLORIDE, SODIUM LACTATE, POTASSIUM CHLORIDE, CALCIUM CHLORIDE 600; 310; 30; 20 MG/100ML; MG/100ML; MG/100ML; MG/100ML
75 INJECTION, SOLUTION INTRAVENOUS CONTINUOUS
Status: DISPENSED | OUTPATIENT
Start: 2019-09-16 | End: 2019-09-17

## 2019-09-16 RX ORDER — ALBUMIN HUMAN 50 G/1000ML
SOLUTION INTRAVENOUS AS NEEDED
Status: DISCONTINUED | OUTPATIENT
Start: 2019-09-16 | End: 2019-09-16 | Stop reason: HOSPADM

## 2019-09-16 RX ORDER — ROCURONIUM BROMIDE 10 MG/ML
INJECTION, SOLUTION INTRAVENOUS AS NEEDED
Status: DISCONTINUED | OUTPATIENT
Start: 2019-09-16 | End: 2019-09-16 | Stop reason: HOSPADM

## 2019-09-16 RX ORDER — OXYCODONE HYDROCHLORIDE 5 MG/1
5 TABLET ORAL
Status: DISCONTINUED | OUTPATIENT
Start: 2019-09-16 | End: 2019-09-21 | Stop reason: HOSPADM

## 2019-09-16 RX ORDER — CELECOXIB 100 MG/1
100 CAPSULE ORAL 2 TIMES DAILY
Status: DISCONTINUED | OUTPATIENT
Start: 2019-09-18 | End: 2019-09-19

## 2019-09-16 RX ORDER — BUPIVACAINE HYDROCHLORIDE AND EPINEPHRINE 2.5; 5 MG/ML; UG/ML
INJECTION, SOLUTION EPIDURAL; INFILTRATION; INTRACAUDAL; PERINEURAL AS NEEDED
Status: DISCONTINUED | OUTPATIENT
Start: 2019-09-16 | End: 2019-09-16 | Stop reason: HOSPADM

## 2019-09-16 RX ORDER — NALOXONE HYDROCHLORIDE 0.4 MG/ML
0.4 INJECTION, SOLUTION INTRAMUSCULAR; INTRAVENOUS; SUBCUTANEOUS AS NEEDED
Status: DISCONTINUED | OUTPATIENT
Start: 2019-09-16 | End: 2019-09-21 | Stop reason: HOSPADM

## 2019-09-16 RX ORDER — MAGNESIUM SULFATE HEPTAHYDRATE 40 MG/ML
INJECTION, SOLUTION INTRAVENOUS AS NEEDED
Status: DISCONTINUED | OUTPATIENT
Start: 2019-09-16 | End: 2019-09-16 | Stop reason: HOSPADM

## 2019-09-16 RX ADMIN — ALBUMIN (HUMAN) 250 ML: 12.5 INJECTION, SOLUTION INTRAVENOUS at 16:14

## 2019-09-16 RX ADMIN — ROCURONIUM BROMIDE 15 MG: 10 SOLUTION INTRAVENOUS at 17:17

## 2019-09-16 RX ADMIN — PROPOFOL 150 MG: 10 INJECTION, EMULSION INTRAVENOUS at 16:04

## 2019-09-16 RX ADMIN — CEFOTETAN DISODIUM 2 G: 2 INJECTION, POWDER, FOR SOLUTION INTRAMUSCULAR; INTRAVENOUS at 16:17

## 2019-09-16 RX ADMIN — ROCURONIUM BROMIDE 10 MG: 10 SOLUTION INTRAVENOUS at 16:37

## 2019-09-16 RX ADMIN — DEXMEDETOMIDINE HYDROCHLORIDE 8 MCG: 100 INJECTION, SOLUTION, CONCENTRATE INTRAVENOUS at 19:45

## 2019-09-16 RX ADMIN — LIDOCAINE HYDROCHLORIDE 80 MG: 20 INJECTION, SOLUTION EPIDURAL; INFILTRATION; INTRACAUDAL; PERINEURAL at 16:04

## 2019-09-16 RX ADMIN — SODIUM CHLORIDE, SODIUM LACTATE, POTASSIUM CHLORIDE, AND CALCIUM CHLORIDE 75 ML/HR: 600; 310; 30; 20 INJECTION, SOLUTION INTRAVENOUS at 22:00

## 2019-09-16 RX ADMIN — PIPERACILLIN SODIUM,TAZOBACTAM SODIUM 3.38 G: 3; .375 INJECTION, POWDER, FOR SOLUTION INTRAVENOUS at 06:06

## 2019-09-16 RX ADMIN — SUCCINYLCHOLINE CHLORIDE 120 MG: 20 INJECTION, SOLUTION INTRAMUSCULAR; INTRAVENOUS at 16:05

## 2019-09-16 RX ADMIN — ACETAMINOPHEN 1000 MG: 500 TABLET ORAL at 14:19

## 2019-09-16 RX ADMIN — LIDOCAINE HYDROCHLORIDE 2 MG/KG/HR: 8 INJECTION, SOLUTION INTRAVENOUS at 16:04

## 2019-09-16 RX ADMIN — MAGNESIUM SULFATE HEPTAHYDRATE 2 G: 40 INJECTION, SOLUTION INTRAVENOUS at 16:27

## 2019-09-16 RX ADMIN — GABAPENTIN 600 MG: 300 CAPSULE ORAL at 14:19

## 2019-09-16 RX ADMIN — ONDANSETRON HYDROCHLORIDE 4 MG: 2 INJECTION, SOLUTION INTRAMUSCULAR; INTRAVENOUS at 16:32

## 2019-09-16 RX ADMIN — SODIUM CHLORIDE, SODIUM LACTATE, POTASSIUM CHLORIDE, AND CALCIUM CHLORIDE 75 ML/HR: 600; 310; 30; 20 INJECTION, SOLUTION INTRAVENOUS at 13:58

## 2019-09-16 RX ADMIN — CELECOXIB 200 MG: 200 CAPSULE ORAL at 14:18

## 2019-09-16 RX ADMIN — HYDROMORPHONE HYDROCHLORIDE 0.5 MG: 1 INJECTION, SOLUTION INTRAMUSCULAR; INTRAVENOUS; SUBCUTANEOUS at 09:46

## 2019-09-16 RX ADMIN — OXYCODONE AND ACETAMINOPHEN 1 TABLET: 5; 325 TABLET ORAL at 03:19

## 2019-09-16 RX ADMIN — MIDAZOLAM HYDROCHLORIDE 1 MG: 1 INJECTION, SOLUTION INTRAMUSCULAR; INTRAVENOUS at 15:55

## 2019-09-16 RX ADMIN — MIDAZOLAM HYDROCHLORIDE 2 MG: 1 INJECTION, SOLUTION INTRAMUSCULAR; INTRAVENOUS at 15:54

## 2019-09-16 RX ADMIN — KETAMINE HYDROCHLORIDE 25 MG: 10 INJECTION, SOLUTION INTRAMUSCULAR; INTRAVENOUS at 16:04

## 2019-09-16 RX ADMIN — LIDOCAINE HYDROCHLORIDE 1 MG/KG/HR: 8 INJECTION, SOLUTION INTRAVENOUS at 22:54

## 2019-09-16 RX ADMIN — SODIUM CHLORIDE, SODIUM LACTATE, POTASSIUM CHLORIDE, AND CALCIUM CHLORIDE 125 ML/HR: 600; 310; 30; 20 INJECTION, SOLUTION INTRAVENOUS at 14:35

## 2019-09-16 RX ADMIN — MIDAZOLAM HYDROCHLORIDE 1 MG: 1 INJECTION, SOLUTION INTRAMUSCULAR; INTRAVENOUS at 16:01

## 2019-09-16 RX ADMIN — ROCURONIUM BROMIDE 35 MG: 10 SOLUTION INTRAVENOUS at 16:09

## 2019-09-16 RX ADMIN — DEXAMETHASONE SODIUM PHOSPHATE 4 MG: 4 INJECTION, SOLUTION INTRAMUSCULAR; INTRAVENOUS at 16:32

## 2019-09-16 RX ADMIN — ROCURONIUM BROMIDE 5 MG: 10 SOLUTION INTRAVENOUS at 16:04

## 2019-09-16 RX ADMIN — FENTANYL CITRATE 25 MCG: 50 INJECTION INTRAMUSCULAR; INTRAVENOUS at 20:30

## 2019-09-16 RX ADMIN — SODIUM CHLORIDE 75 ML/HR: 900 INJECTION, SOLUTION INTRAVENOUS at 06:07

## 2019-09-16 RX ADMIN — KETAMINE HYDROCHLORIDE 25 MG: 10 INJECTION, SOLUTION INTRAMUSCULAR; INTRAVENOUS at 16:25

## 2019-09-16 RX ADMIN — MIDAZOLAM HYDROCHLORIDE 1 MG: 1 INJECTION, SOLUTION INTRAMUSCULAR; INTRAVENOUS at 15:57

## 2019-09-16 RX ADMIN — DEXMEDETOMIDINE HYDROCHLORIDE 4 MCG: 100 INJECTION, SOLUTION, CONCENTRATE INTRAVENOUS at 19:55

## 2019-09-16 RX ADMIN — FENTANYL CITRATE 100 MCG: 50 INJECTION, SOLUTION INTRAMUSCULAR; INTRAVENOUS at 16:04

## 2019-09-16 RX ADMIN — ENOXAPARIN SODIUM 40 MG: 40 INJECTION SUBCUTANEOUS at 22:42

## 2019-09-16 RX ADMIN — ALVIMOPAN 12 MG: 12 CAPSULE ORAL at 14:18

## 2019-09-16 RX ADMIN — Medication 10 ML: at 06:07

## 2019-09-16 RX ADMIN — SUGAMMADEX 150 MG: 100 INJECTION, SOLUTION INTRAVENOUS at 19:43

## 2019-09-16 RX ADMIN — ALBUMIN (HUMAN) 250 ML: 12.5 INJECTION, SOLUTION INTRAVENOUS at 17:52

## 2019-09-16 NOTE — PROGRESS NOTES
Verbal report given to Mulugeta Bruno RN in [employer]. Opportunity provided for questions.      Elena Coreas RN

## 2019-09-16 NOTE — BRIEF OP NOTE
BRIEF OPERATIVE NOTE    Date of Procedure: 9/16/2019   Preoperative Diagnosis: ILEOCOLIC STRICTURE AND ABCESS; RIGHT OVARIAN CYST  Postoperative Diagnosis: ILEOCOLIC STRICTURE AND ABCESS; RIGHT OVARIAN CYST    Procedure(s):  ROBOTIC ILEOCOLIC RESECTION WITH REPAIR OF SEROSAL TEAR  Surgeon(s) and Role:     * Mohsen Churchill MD - Primary         Surgical Assistant: Ellie Osuna    Surgical Staff:  Circ-1: Maggie Gonzalez  Circ-Relief: Pankaj Moreno RN  Scrub RN-1: Puja Ziegler RN  Scrub RN-Relief: Anna Zarate RN  Surg Asst-1: Rossana Dan  Surg Asst-Relief: Dorina LANE  Event Time In Time Out   Incision Start 09/16/2019 1629    Incision Close       Anesthesia: General   Estimated Blood Loss: 350cc  Specimens:   ID Type Source Tests Collected by Time Destination   1 : ILEOCOLIC RESECTION WITH ABSCESS Fresh Colon  Mohsen Churchill MD 9/16/2019 1935 Pathology   1 : ILEOCOLIC ABSCESS Wound Abdomen ANAEROBIC/AEROBIC/GRAM STAIN Mohsen Churchill MD 9/16/2019 1850 Microbiology      Findings: inflamed friable ileocolic stricture/impending fistula to small bowel, abscess, right ovarian cyst   Complications: none apparent  Implants: * No implants in log *

## 2019-09-16 NOTE — PROGRESS NOTES
[address]  [address] [facility], [address]       GI PROGRESS NOTE        NAME: Atul Hilton   :  1995   MRN:  [id number]       Subjective:   Still with lower abdominal pain. 5 BMs on average, diarrhea. Objective:     VITALS:   Last 24hrs VS reviewed since prior progress note. Most recent are:  Visit Vitals  /70 (BP 1 Location: Right arm, BP Patient Position: At rest)   Pulse 70   Temp 98.7 °F (37.1 °C)   Resp 16   Ht 5' 5\" (1.651 m)   Wt 67.5 kg (148 lb 12.8 oz)   SpO2 97%   Breastfeeding? No   BMI 24.76 kg/m²       PHYSICAL EXAM:  General: Cooperative, no acute distress    Lungs:  CTA bilaterally anteriorly  Heart:  S1 S2  Abdomen: Soft, non-distended, RLQ and RLQ tenderness, no guarding, no rebound. +Bowel sounds. Extremities: Warm  Psych:   Good insight. Not anxious or agitated. Lab Data Reviewed:     Recent Results (from the past 24 hour(s))   CBC WITH AUTOMATED DIFF    Collection Time: 19  3:14 AM   Result Value Ref Range    WBC 5.3 3.6 - 11.0 K/uL    RBC 4.33 3.80 - 5.20 M/uL    HGB 11.1 (L) 11.5 - 16.0 g/dL    HCT 36.4 35.0 - 47.0 %    MCV 84.1 80.0 - 99.0 FL    MCH 25.6 (L) 26.0 - 34.0 PG    MCHC 30.5 30.0 - 36.5 g/dL    RDW 12.6 11.5 - 14.5 %    PLATELET 047 580 - 245 K/uL    MPV 9.4 8.9 - 12.9 FL    NRBC 0.0 0  WBC    ABSOLUTE NRBC 0.00 0.00 - 0.01 K/uL    NEUTROPHILS 69 32 - 75 %    LYMPHOCYTES 12 12 - 49 %    MONOCYTES 14 (H) 5 - 13 %    EOSINOPHILS 4 0 - 7 %    BASOPHILS 1 0 - 1 %    IMMATURE GRANULOCYTES 0 0.0 - 0.5 %    ABS. NEUTROPHILS 3.7 1.8 - 8.0 K/UL    ABS. LYMPHOCYTES 0.6 (L) 0.8 - 3.5 K/UL    ABS. MONOCYTES 0.7 0.0 - 1.0 K/UL    ABS. EOSINOPHILS 0.2 0.0 - 0.4 K/UL    ABS. BASOPHILS 0.1 0.0 - 0.1 K/UL    ABS. IMM.  GRANS. 0.0 0.00 - 0.04 K/UL    DF SMEAR SCANNED      RBC COMMENTS HYPOCHROMIA  1+       METABOLIC PANEL, BASIC    Collection Time: 19  3:14 AM   Result Value Ref Range    Sodium 140 136 - 145 mmol/L    Potassium 4.4 3.5 - 5.1 mmol/L    Chloride 106 97 - 108 mmol/L    CO2 24 21 - 32 mmol/L    Anion gap 10 5 - 15 mmol/L    Glucose 80 65 - 100 mg/dL    BUN 7 6 - 20 MG/DL    Creatinine 0.62 0.55 - 1.02 MG/DL    BUN/Creatinine ratio 11 (L) 12 - 20      GFR est AA >60 >60 ml/min/1.73m2    GFR est non-AA >60 >60 ml/min/1.73m2    Calcium 8.5 8.5 - 10.1 MG/DL       Assessment:   · Crohn's disease: on Humira and mesalamine. CT showed abscess and fistula. WBC 6.2, Hgb 10.2. Stool panel (9/9): positive for Plesiomonas shigelloides. C. difficile negative. 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 ID consulted.       Patient Active Problem List   Diagnosis Code    Crohn disease ([facility] [address] K50.90    Plantar wart of right foot B07.0    Psoriasis L40.9    Anxiety F41.9    Abscess of abdominal cavity ([facility] K65.1     Plan:   · Plan for surgery today  · Encourage nutritional supplements  · On mesalamine  · Antibiotics per ID   · Dr. Kadi Dorsey to follow tomorrow       Signed By: Stan Salamanca MD     9/16/2019  9:35 AM

## 2019-09-16 NOTE — ANESTHESIA PREPROCEDURE EVALUATION
Relevant Problems   No relevant active problems       Anesthetic History   No history of anesthetic complications            Review of Systems / Medical History  Patient summary reviewed, nursing notes reviewed and pertinent labs reviewed    Pulmonary  Within defined limits                 Neuro/Psych   Within defined limits           Cardiovascular  Within defined limits                Exercise tolerance: >4 METS     GI/Hepatic/Renal  Within defined limits              Endo/Other  Within defined limits      Anemia     Other Findings   Comments: Crohn's disease            Physical Exam    Airway  Mallampati: II  TM Distance: > 6 cm  Neck ROM: normal range of motion   Mouth opening: Normal     Cardiovascular  Regular rate and rhythm,  S1 and S2 normal,  no murmur, click, rub, or gallop             Dental  No notable dental hx       Pulmonary  Breath sounds clear to auscultation               Abdominal  GI exam deferred       Other Findings            Anesthetic Plan    ASA: 2  Anesthesia type: general

## 2019-09-16 NOTE — PROGRESS NOTES
Multiple attempts x 4 - 5 at venous access. Message left with the vascular access team this morning for assistance.  Will notify the day shift charge nurse of this

## 2019-09-17 LAB
ANION GAP SERPL CALC-SCNC: 8 MMOL/L (ref 5–15)
BUN SERPL-MCNC: 8 MG/DL (ref 6–20)
BUN/CREAT SERPL: 11 (ref 12–20)
CALCIUM SERPL-MCNC: 8.7 MG/DL (ref 8.5–10.1)
CHLORIDE SERPL-SCNC: 104 MMOL/L (ref 97–108)
CO2 SERPL-SCNC: 27 MMOL/L (ref 21–32)
CREAT SERPL-MCNC: 0.72 MG/DL (ref 0.55–1.02)
ERYTHROCYTE [DISTWIDTH] IN BLOOD BY AUTOMATED COUNT: 12.5 % (ref 11.5–14.5)
GLUCOSE SERPL-MCNC: 157 MG/DL (ref 65–100)
HCT VFR BLD AUTO: 30.3 % (ref 35–47)
HGB BLD-MCNC: 9.5 G/DL (ref 11.5–16)
MAGNESIUM SERPL-MCNC: 2.4 MG/DL (ref 1.6–2.4)
MCH RBC QN AUTO: 26 PG (ref 26–34)
MCHC RBC AUTO-ENTMCNC: 31.4 G/DL (ref 30–36.5)
MCV RBC AUTO: 82.8 FL (ref 80–99)
NRBC # BLD: 0 K/UL (ref 0–0.01)
NRBC BLD-RTO: 0 PER 100 WBC
PHOSPHATE SERPL-MCNC: 3.2 MG/DL (ref 2.6–4.7)
PLATELET # BLD AUTO: 338 K/UL (ref 150–400)
PMV BLD AUTO: 9.4 FL (ref 8.9–12.9)
POTASSIUM SERPL-SCNC: 3.8 MMOL/L (ref 3.5–5.1)
RBC # BLD AUTO: 3.66 M/UL (ref 3.8–5.2)
SODIUM SERPL-SCNC: 139 MMOL/L (ref 136–145)
WBC # BLD AUTO: 8.4 K/UL (ref 3.6–11)

## 2019-09-17 PROCEDURE — 74011250636 HC RX REV CODE- 250/636: Performed by: PHYSICIAN ASSISTANT

## 2019-09-17 PROCEDURE — 36415 COLL VENOUS BLD VENIPUNCTURE: CPT

## 2019-09-17 PROCEDURE — 84100 ASSAY OF PHOSPHORUS: CPT

## 2019-09-17 PROCEDURE — 74011250636 HC RX REV CODE- 250/636: Performed by: INTERNAL MEDICINE

## 2019-09-17 PROCEDURE — 74011250637 HC RX REV CODE- 250/637: Performed by: COLON & RECTAL SURGERY

## 2019-09-17 PROCEDURE — 74011250636 HC RX REV CODE- 250/636: Performed by: COLON & RECTAL SURGERY

## 2019-09-17 PROCEDURE — 74011250637 HC RX REV CODE- 250/637: Performed by: INTERNAL MEDICINE

## 2019-09-17 PROCEDURE — 65270000029 HC RM PRIVATE

## 2019-09-17 PROCEDURE — 74011000258 HC RX REV CODE- 258: Performed by: INTERNAL MEDICINE

## 2019-09-17 PROCEDURE — 85027 COMPLETE CBC AUTOMATED: CPT

## 2019-09-17 PROCEDURE — 83735 ASSAY OF MAGNESIUM: CPT

## 2019-09-17 PROCEDURE — 80048 BASIC METABOLIC PNL TOTAL CA: CPT

## 2019-09-17 RX ORDER — HYDROMORPHONE HYDROCHLORIDE 1 MG/ML
0.5 INJECTION, SOLUTION INTRAMUSCULAR; INTRAVENOUS; SUBCUTANEOUS ONCE
Status: COMPLETED | OUTPATIENT
Start: 2019-09-17 | End: 2019-09-17

## 2019-09-17 RX ORDER — HYDROMORPHONE HYDROCHLORIDE 1 MG/ML
1 INJECTION, SOLUTION INTRAMUSCULAR; INTRAVENOUS; SUBCUTANEOUS
Status: DISCONTINUED | OUTPATIENT
Start: 2019-09-17 | End: 2019-09-21 | Stop reason: HOSPADM

## 2019-09-17 RX ADMIN — HYDROMORPHONE HYDROCHLORIDE 0.5 MG: 1 INJECTION, SOLUTION INTRAMUSCULAR; INTRAVENOUS; SUBCUTANEOUS at 04:40

## 2019-09-17 RX ADMIN — PIPERACILLIN SODIUM,TAZOBACTAM SODIUM 3.38 G: 3; .375 INJECTION, POWDER, FOR SOLUTION INTRAVENOUS at 01:47

## 2019-09-17 RX ADMIN — Medication 10 ML: at 16:51

## 2019-09-17 RX ADMIN — ACETAMINOPHEN 1000 MG: 500 TABLET ORAL at 05:56

## 2019-09-17 RX ADMIN — MESALAMINE 1600 MG: 800 TABLET, DELAYED RELEASE ORAL at 10:19

## 2019-09-17 RX ADMIN — MESALAMINE 1600 MG: 800 TABLET, DELAYED RELEASE ORAL at 03:16

## 2019-09-17 RX ADMIN — ACETAMINOPHEN 1000 MG: 500 TABLET ORAL at 18:14

## 2019-09-17 RX ADMIN — HYDROMORPHONE HYDROCHLORIDE 1 MG: 1 INJECTION, SOLUTION INTRAMUSCULAR; INTRAVENOUS; SUBCUTANEOUS at 22:36

## 2019-09-17 RX ADMIN — ENOXAPARIN SODIUM 40 MG: 40 INJECTION SUBCUTANEOUS at 22:35

## 2019-09-17 RX ADMIN — OXYCODONE HYDROCHLORIDE 5 MG: 5 TABLET ORAL at 18:18

## 2019-09-17 RX ADMIN — ALVIMOPAN 12 MG: 12 CAPSULE ORAL at 09:34

## 2019-09-17 RX ADMIN — OXYCODONE HYDROCHLORIDE 5 MG: 5 TABLET ORAL at 13:28

## 2019-09-17 RX ADMIN — MESALAMINE 1600 MG: 800 TABLET, DELAYED RELEASE ORAL at 22:37

## 2019-09-17 RX ADMIN — LIDOCAINE HYDROCHLORIDE 1 MG/KG/HR: 8 INJECTION, SOLUTION INTRAVENOUS at 13:37

## 2019-09-17 RX ADMIN — Medication 10 ML: at 13:33

## 2019-09-17 RX ADMIN — PIPERACILLIN SODIUM,TAZOBACTAM SODIUM 3.38 G: 3; .375 INJECTION, POWDER, FOR SOLUTION INTRAVENOUS at 17:09

## 2019-09-17 RX ADMIN — Medication 10 ML: at 19:58

## 2019-09-17 RX ADMIN — HYDROMORPHONE HYDROCHLORIDE 0.5 MG: 1 INJECTION, SOLUTION INTRAMUSCULAR; INTRAVENOUS; SUBCUTANEOUS at 00:45

## 2019-09-17 RX ADMIN — HYDROMORPHONE HYDROCHLORIDE 0.5 MG: 1 INJECTION, SOLUTION INTRAMUSCULAR; INTRAVENOUS; SUBCUTANEOUS at 15:29

## 2019-09-17 RX ADMIN — HYDROMORPHONE HYDROCHLORIDE 1 MG: 1 INJECTION, SOLUTION INTRAMUSCULAR; INTRAVENOUS; SUBCUTANEOUS at 19:47

## 2019-09-17 RX ADMIN — ACETAMINOPHEN 1000 MG: 500 TABLET ORAL at 00:06

## 2019-09-17 RX ADMIN — ACETAMINOPHEN 1000 MG: 500 TABLET ORAL at 12:26

## 2019-09-17 RX ADMIN — HYDROMORPHONE HYDROCHLORIDE 0.5 MG: 1 INJECTION, SOLUTION INTRAMUSCULAR; INTRAVENOUS; SUBCUTANEOUS at 16:50

## 2019-09-17 RX ADMIN — FERROUS SULFATE TAB 325 MG (65 MG ELEMENTAL FE) 325 MG: 325 (65 FE) TAB at 07:33

## 2019-09-17 RX ADMIN — KETOROLAC TROMETHAMINE 15 MG: 30 INJECTION, SOLUTION INTRAMUSCULAR; INTRAVENOUS at 19:57

## 2019-09-17 RX ADMIN — KETOROLAC TROMETHAMINE 15 MG: 30 INJECTION, SOLUTION INTRAMUSCULAR; INTRAVENOUS at 05:57

## 2019-09-17 RX ADMIN — HYDROMORPHONE HYDROCHLORIDE 0.5 MG: 1 INJECTION, SOLUTION INTRAMUSCULAR; INTRAVENOUS; SUBCUTANEOUS at 09:45

## 2019-09-17 RX ADMIN — SODIUM CHLORIDE, SODIUM LACTATE, POTASSIUM CHLORIDE, AND CALCIUM CHLORIDE 75 ML/HR: 600; 310; 30; 20 INJECTION, SOLUTION INTRAVENOUS at 13:30

## 2019-09-17 RX ADMIN — Medication 10 ML: at 19:48

## 2019-09-17 RX ADMIN — MESALAMINE 1600 MG: 800 TABLET, DELAYED RELEASE ORAL at 17:08

## 2019-09-17 RX ADMIN — PIPERACILLIN SODIUM,TAZOBACTAM SODIUM 3.38 G: 3; .375 INJECTION, POWDER, FOR SOLUTION INTRAVENOUS at 09:35

## 2019-09-17 RX ADMIN — ALVIMOPAN 12 MG: 12 CAPSULE ORAL at 18:14

## 2019-09-17 RX ADMIN — KETOROLAC TROMETHAMINE 15 MG: 30 INJECTION, SOLUTION INTRAMUSCULAR; INTRAVENOUS at 12:27

## 2019-09-17 RX ADMIN — Medication 10 ML: at 22:36

## 2019-09-17 RX ADMIN — OXYCODONE HYDROCHLORIDE 5 MG: 5 TABLET ORAL at 07:33

## 2019-09-17 RX ADMIN — ESCITALOPRAM OXALATE 20 MG: 10 TABLET ORAL at 09:34

## 2019-09-17 NOTE — PROGRESS NOTES
Bedside shift change report given to Bianka Fernandez (oncoming nurse) by Tay HIGH (offgoing nurse). Report included the following information SBAR and Kardex.

## 2019-09-17 NOTE — PROGRESS NOTES
[address]  [address] [facility], [address]       GI PROGRESS NOTE  [facility]  [phone] NP/PA in-hospital cell phone M-F until 4:30PM  After 5PM or on weekends, please call  for physician on call      NAME: Olivia Wei   :  1995   MRN:  [id number]       Subjective:    Ileocolic resection yesterday. +bowel movement and flatus. She doesn't feel like pain is well managed. She is frustrated, doesn't feel like anything is working. She acknowledges poor sleep and fatigue contributing. Objective:     VITALS:   Last 24hrs VS reviewed since prior progress note. Most recent are:  Visit Vitals  /67 (BP 1 Location: Left arm, BP Patient Position: At rest)   Pulse 76   Temp 97.8 °F (36.6 °C)   Resp 16   Ht 5' 5\" (1.651 m)   Wt 66.3 kg (146 lb 1.6 oz)   SpO2 96%   Breastfeeding? No   BMI 24.31 kg/m²       PHYSICAL EXAM:  General: Cooperative, no acute distress    Neurologic:  Alert and oriented X 3  HEENT: EOMI, no scleral icterus   Lungs:  CTA bilaterally anteriorly  Heart:  S1 S2  Abdomen: Soft, non-distended, +tenderness, no guarding, no rebound. +Bowel sounds.    Extremities: Warm  Psych:   Tearful      Lab Data Reviewed:     Recent Results (from the past 24 hour(s))   TYPE & SCREEN    Collection Time: 19  5:46 PM   Result Value Ref Range    Crossmatch Expiration 2019     ABO/Rh(D) A POSITIVE     Antibody screen NEG    CULTURE, WOUND W GRAM STAIN    Collection Time: 19  6:50 PM   Result Value Ref Range    Special Requests: ILEOCLIC ABSCESS, LOOK FPR ANAEROBES     GRAM STAIN RARE WBCS SEEN      GRAM STAIN NO ORGANISMS SEEN      Culture result: PENDING    CBC W/O DIFF    Collection Time: 19  2:15 AM   Result Value Ref Range    WBC 8.4 3.6 - 11.0 K/uL    RBC 3.66 (L) 3.80 - 5.20 M/uL    HGB 9.5 (L) 11.5 - 16.0 g/dL    HCT 30.3 (L) 35.0 - 47.0 %    MCV 82.8 80.0 - 99.0 FL    MCH 26.0 26.0 - 34.0 PG    MCHC 31.4 30.0 - 36.5 g/dL    RDW 12.5 11.5 - 14.5 %    PLATELET 913 120 - 447 K/uL    MPV 9.4 8.9 - 12.9 FL    NRBC 0.0 0  WBC    ABSOLUTE NRBC 0.00 0.00 - 5.11 K/uL   METABOLIC PANEL, BASIC    Collection Time: 09/17/19  2:15 AM   Result Value Ref Range    Sodium 139 136 - 145 mmol/L    Potassium 3.8 3.5 - 5.1 mmol/L    Chloride 104 97 - 108 mmol/L    CO2 27 21 - 32 mmol/L    Anion gap 8 5 - 15 mmol/L    Glucose 157 (H) 65 - 100 mg/dL    BUN 8 6 - 20 MG/DL    Creatinine 0.72 0.55 - 1.02 MG/DL    BUN/Creatinine ratio 11 (L) 12 - 20      GFR est AA >60 >60 ml/min/1.73m2    GFR est non-AA >60 >60 ml/min/1.73m2    Calcium 8.7 8.5 - 10.1 MG/DL   MAGNESIUM    Collection Time: 09/17/19  2:15 AM   Result Value Ref Range    Magnesium 2.4 1.6 - 2.4 mg/dL   PHOSPHORUS    Collection Time: 09/17/19  2:15 AM   Result Value Ref Range    Phosphorus 3.2 2.6 - 4.7 MG/DL       Assessment:   · Crohn's disease: CT showed abscess and fistula. WBC 8.4, Hgb 9.5. Stool panel (9/9): positive for Plesiomonas shigelloides. C. difficile negative. Status post ileocolic resection with repair of serosal tear 9/16/19 - inflamed friable ileocolic stricture/impending fistula to small bowel, abscess  · Plesiomonas shigelloides      Patient Active Problem List   Diagnosis Code    Crohn disease ([facility] [address] K50.90    Plantar wart of right foot B07.0    Psoriasis L40.9    Anxiety F41.9    Abscess of abdominal cavity ([facility]) K65.1     Plan:   · Antibiotics per ID   · Supportive measures - post-op management per surgery     Signed By: Radha Massey NP     9/17/2019  9:35 AM         GI Attending: This patient was seen and examined by me in a face-to-face visit today. I reviewed the medical record including lab work, imaging and other provider notes. I confirmed the interval history as described above. I spoke to the patient, discussing our findings and plans. I discussed this case in detail with Breanna Yeager NP.  I formulated an updated  assessment of this patient and guided our treatment plan. I agree with the above progress note. I agree with the history, exam and assessment and plan as outlined in the note. I would like to add the following: Appreciate CRS and ID teams' care. We will continue to follow along with you. Will hold off on restarting Humira at this time. I would consider this in the future following recovery from her surgery and completion of her antibiotic course. Brennon Plaza MD

## 2019-09-17 NOTE — PROGRESS NOTES
Call placed to Dr. Aniyah Leroy r/t patients complaint pain rating #8 on scale 1-10 inspit  of pain medications  given.

## 2019-09-17 NOTE — PROGRESS NOTES
General Daily Progress Note    Admit Date: 9/8/2019  1 Day Post-Op   Subjective:     Patient stable today. Difficulty controlling pain today. IV dilaudid increased this afternoon. Patient tolerating clears. Tried solid foods but not much appetite    Objective:     Patient Vitals for the past 24 hrs:   BP Temp Pulse Resp SpO2   09/17/19 1524 105/55 97.5 °F (36.4 °C) 82 16 97 %   09/17/19 1337 145/62  98     09/17/19 1134 110/62 98.4 °F (36.9 °C) 72 18 98 %   09/17/19 0827 106/67 97.8 °F (36.6 °C) 76 16 96 %   09/17/19 0519 97/58 97.7 °F (36.5 °C) 72 16 96 %   09/17/19 0050 108/74 98.3 °F (36.8 °C) 89 16 96 %   09/16/19 2223 110/70 98.7 °F (37.1 °C) 87 16 98 %   09/16/19 2140 120/70  64 12 100 %   09/16/19 2130 122/62  70 11 100 %   09/16/19 2115 116/63  66 11 100 %   09/16/19 2100 122/70  73 12 100 %   09/16/19 2055   77 10 100 %   09/16/19 2050   74 11 100 %   09/16/19 2045 125/71  79 17 100 %   09/16/19 2040   71 12 100 %   09/16/19 2035   72 12 100 %   09/16/19 2030 126/79  83 22 97 %   09/16/19 2025   78 21 98 %   09/16/19 2020   82 24 97 %   09/16/19 2015 123/73  73 14 95 %   09/16/19 2010 121/75  72 14 94 %   09/16/19 2005 115/69  76 13 94 %   09/16/19 2002 116/69 98 °F (36.7 °C) 84 14 95 %   09/16/19 2000 116/69         Patient Vitals for the past 8 hrs:   BP Temp Pulse Resp SpO2   09/17/19 1524 105/55 97.5 °F (36.4 °C) 82 16 97 %   09/17/19 1337 145/62  98     09/17/19 1134 110/62 98.4 °F (36.9 °C) 72 18 98 %     09/17 0701 - 09/17 1900  In: 656.8 [I.V.:656.8]  Out: 250 [Urine:200]  09/15 1901 - 09/17 0700  In: 0701 [I.V.:1673]  Out: 2100 [Urine:1400]    Physical Exam:   Alert and oriented. No distress  Abdomen soft. Expected tenderness.   Incisions clean and dry          Data Review   Recent Results (from the past 24 hour(s))   TYPE & SCREEN    Collection Time: 09/16/19  5:46 PM   Result Value Ref Range    Crossmatch Expiration 09/19/2019     ABO/Rh(D) A POSITIVE Antibody screen NEG    CULTURE, WOUND W GRAM STAIN    Collection Time: 09/16/19  6:50 PM   Result Value Ref Range    Special Requests: ILEOCLIC ABSCESS, LOOK FPR ANAEROBES     GRAM STAIN RARE WBCS SEEN      GRAM STAIN NO ORGANISMS SEEN      Culture result: NO GROWTH AFTER 17 HOURS     CBC W/O DIFF    Collection Time: 09/17/19  2:15 AM   Result Value Ref Range    WBC 8.4 3.6 - 11.0 K/uL    RBC 3.66 (L) 3.80 - 5.20 M/uL    HGB 9.5 (L) 11.5 - 16.0 g/dL    HCT 30.3 (L) 35.0 - 47.0 %    MCV 82.8 80.0 - 99.0 FL    MCH 26.0 26.0 - 34.0 PG    MCHC 31.4 30.0 - 36.5 g/dL    RDW 12.5 11.5 - 14.5 %    PLATELET 474 628 - 970 K/uL    MPV 9.4 8.9 - 12.9 FL    NRBC 0.0 0  WBC    ABSOLUTE NRBC 0.00 0.00 - 9.83 K/uL   METABOLIC PANEL, BASIC    Collection Time: 09/17/19  2:15 AM   Result Value Ref Range    Sodium 139 136 - 145 mmol/L    Potassium 3.8 3.5 - 5.1 mmol/L    Chloride 104 97 - 108 mmol/L    CO2 27 21 - 32 mmol/L    Anion gap 8 5 - 15 mmol/L    Glucose 157 (H) 65 - 100 mg/dL    BUN 8 6 - 20 MG/DL    Creatinine 0.72 0.55 - 1.02 MG/DL    BUN/Creatinine ratio 11 (L) 12 - 20      GFR est AA >60 >60 ml/min/1.73m2    GFR est non-AA >60 >60 ml/min/1.73m2    Calcium 8.7 8.5 - 10.1 MG/DL   MAGNESIUM    Collection Time: 09/17/19  2:15 AM   Result Value Ref Range    Magnesium 2.4 1.6 - 2.4 mg/dL   PHOSPHORUS    Collection Time: 09/17/19  2:15 AM   Result Value Ref Range    Phosphorus 3.2 2.6 - 4.7 MG/DL         Assessment:     Principal Problem:    Abscess of abdominal cavity ([facility] (9/8/2019)    Active Problems:    Crohn disease ([facility] (12/19/2016)      1 Day Post-Op   Plan:     - adv diet  - OOB AT   - DVT prophylaxis -> lovenox  - D/C Hicks catheter    Nehal Christianson PA-C

## 2019-09-17 NOTE — PROGRESS NOTES
Hospitalist Progress Note  Reena Merlos MD  Answering service: [phone] OR [phone] from in house phone        Date of Service:  2019  NAME:  Pacheco Driver  :  1995  MRN:  [id number]  PCP: Molly Johnson MD    Chief Complaint:   Chief Complaint   Patient presents with    Inflammatory Bowel Disease    Abdominal Pain    Fever    Melena         Admission Summary:     Pacheco Driver is a 26 y.o. female who states that she was diagnosed with Crohns disease at the age of 17. Comes in with abdominal pain due to intra-abdominal abscess    Interval history / Subjective:   Pt seen for FU of CC: CD and ileocecal abscess  First encounter  Patient seen and examined by the bedside  Labs, images and notes reviewed  Patient is complaining that her surgical pain is not adequately managed, she is passing flatus and is eating and drinking well. Afebrile, no NV  Discussed with nursing staff, no acute issues overnight, orders reviewed. Assessment & Plan:     Sepsis with intra-abdominal abscess - improving   Secondary to Crohn's disease    - CT abd: reviewed  - IR consulted for abscess drainage but it was not done due ot possibility of adhesion  - s/p ileocolic resection , biopsy pending  - stool cx positive for P. Shigelloides   - ID on board  -  c/w IV zosyn. 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      Hospital Problems  Date Reviewed: 2019          Codes Class Noted POA    * (Principal) Abscess of abdominal cavity ([facility] ICD-10-CM: K65.1  ICD-9-CM: 567.22  2019 Yes        Crohn disease ([facility] ICD-10-CM: K50.90  ICD-9-CM: 555.9  2016 Yes                Review of Systems:   A comprehensive review of systems was negative except for that written in the HPI. Physical Examination:     Visit Vitals  /55 (BP 1 Location: Left arm, BP Patient Position: At rest)   Pulse 82   Temp 97.5 °F (36.4 °C)   Resp 16   Ht 5' 5\" (1.651 m)   Wt 66.3 kg (146 lb 1.6 oz)   SpO2 97%   Breastfeeding? No   BMI 24.31 kg/m²     General appearance: alert, cooperative, no distress, appears stated age  Head: Normocephalic, without obvious abnormality, atraumatic  Eyes: conjunctivae/corneas clear. Lungs: CTA BL, No R/R/W  Heart: RRR,S1S2  Abdomen: soft, surgical sites are clean, mild TTP around the surgical site, BS+  Extremities: no edema  Neurologic: Grossly normal    Vital Signs:    Last 24hrs VS reviewed since prior progress note. Most recent are:    Visit Vitals  /55 (BP 1 Location: Left arm, BP Patient Position: At rest)   Pulse 82   Temp 97.5 °F (36.4 °C)   Resp 16   Ht 5' 5\" (1.651 m)   Wt 66.3 kg (146 lb 1.6 oz)   SpO2 97%   Breastfeeding? No   BMI 24.31 kg/m²         Intake/Output Summary (Last 24 hours) at 2019 1534  Last data filed at 2019 0829  Gross per 24 hour   Intake 1350 ml   Output 2350 ml   Net -1000 ml        Tmax:  Temp (24hrs), Av.1 °F (36.7 °C), Min:97.5 °F (36.4 °C), Max:98.7 °F (37.1 °C)      Data Review:   Data reviewed by myself:  Ct Abd Pelv W Cont    Result Date: 2019  EXAM:  CT abdomen pelvis with contrast INDICATION: Abdominal pain and fever. Inflammatory bowel disease. COMPARISON: None. TECHNIQUE: Helical CT of the abdomen  and pelvis  following the uneventful intravenous administration of nonionic contrast.  Coronal and sagittal reformats are performed.  CT dose reduction was achieved through use of a standardized protocol tailored for this examination and automatic exposure control for dose modulation. Adaptive statistical iterative reconstruction (ASIR) was utilized. FINDINGS: The visualized lung bases demonstrate no mass or consolidation. The heart size is normal. There is no pericardial or pleural effusion. The liver, spleen, pancreas, and adrenal glands are normal. The gall bladder is present  without intra- or extra-hepatic biliary dilatation. The kidneys are symmetric without hydronephrosis. There is marked inflammation involving small bowel loops in the right lower abdomen where there is distal ileum thickening and adjacent inflammation as well as mucosal hyperenhancement. 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 Enhancing but nonenlarged right lower mesenteric lymph nodes are likely reactive. There are no dilated bowel loops. The appendix is unremarkable. There are no enlarged lymph nodes. There is no free air. The urinary bladder is normal.  There is no pelvic mass. An IUD is present. The bony structures are age-appropriate. IMPRESSION: Marked inflammatory changes involving the distal small bowel loops in the right lower quadrant in keeping with history of Crohn disease. 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 No bowel obstruction. Normal appendix. Xr Chest Port    Result Date: 9/8/2019  EXAM:  CR chest portable INDICATION: Fever. Abdominal pain. COMPARISON: None. TECHNIQUE: Portable AP upright chest view at 1956 hours FINDINGS: Cardiac monitoring wires overlie the thorax. The cardiomediastinal contours are within normal limits. The lungs and pleural spaces are clear. There is no pneumothorax. The bones and upper abdomen are unremarkable. IMPRESSION: No acute process.      No results found for: SDES  Lab Results Component Value Date/Time    Culture result: NO GROWTH AFTER 17 HOURS 09/16/2019 06:50 PM    Culture result: NO GROWTH 6 DAYS 09/08/2019 06:25 PM    Culture result: NO GROWTH 6 DAYS 09/08/2019 06:25 PM     All Micro Results     Procedure Component Value Units Date/Time    CULTURE, Cori Crumble STAIN [[id number]] Collected:  09/16/19 1850    Order Status:  Completed Specimen:  Misc.  Wound Updated:  09/17/19 1519     Special Requests: ILEOCLIC ABSCESS, LOOK FPR ANAEROBES     GRAM STAIN RARE WBCS SEEN         NO ORGANISMS SEEN        Culture result: NO GROWTH AFTER 17 HOURS       CULTURE, BLOOD [[id number]] Collected:  09/08/19 1825    Order Status:  Completed Specimen:  Blood Updated:  09/14/19 0610     Special Requests: NO SPECIAL REQUESTS        Culture result: NO GROWTH 6 DAYS       CULTURE, BLOOD [[id number]] Collected:  09/08/19 1825    Order Status:  Completed Specimen:  Blood Updated:  09/14/19 0610     Special Requests: NO SPECIAL REQUESTS        Culture result: NO GROWTH 6 DAYS       CULTURE, STOOL [[id number]]  (Abnormal) Collected:  09/09/19 1114    Order Status:  Completed Specimen:  Stool Updated:  09/11/19 1553     Shigella species, DNA NEGATIVE         Campylobacter species, DNA NEGATIVE         Vibrio species, DNA NEGATIVE         Enterotoxigen E Coli, DNA NEGATIVE         Shiga toxin producing, DNA NEGATIVE         Salmonella species, DNA NEGATIVE         P. shigelloides, DNA POSITIVE        Y. enterocolitica, DNA NEGATIVE        C. DIFFICILE AG & TOXIN A/B [[id number]] Collected:  09/10/19 1114    Order Status:  Completed Specimen:  Stool Updated:  09/11/19 1204     [address] ANTIGEN NEGATIVE         C. difficile toxin NEGATIVE         INTERPRETATION       NEGATIVE FOR TOXIGENIC C. DIFFICILE          OVA & PARASITES, STOOL [[id number]] Collected:  09/10/19 1114    Order Status:  Completed Specimen:  Stool Updated:  09/10/19 1145    URINE CULTURE HOLD SAMPLE [[id number]] Collected:  09/08/19 1838    Order Status:  Completed Specimen:  Urine from Serum Updated:  09/08/19 1846     Urine culture hold       URINE ON HOLD IN MICROBIOLOGY DEPT FOR 3 DAYS. IF UNPRESERVED URINE IS SUBMITTED, IT CANNOT BE USED FOR ADDITIONAL TESTING AFTER 24 HRS, RECOLLECTION WILL BE REQUIRED. Labs: reviewed by myself. Recent Labs     09/17/19 0215 09/16/19 0314   WBC 8.4 5.3   HGB 9.5* 11.1*   HCT 30.3* 36.4    354     Recent Labs     09/17/19 0215 09/16/19 0314    140   K 3.8 4.4    106   CO2 27 24   BUN 8 7   CREA 0.72 0.62   * 80   CA 8.7 8.5   MG 2.4  --    PHOS 3.2  --      No results for input(s): SGOT, GPT, ALT, AP, TBIL, TBILI, TP, ALB, GLOB, GGT, AML, LPSE in the last 72 hours. No lab exists for component: AMYP, HLPSE  No results for input(s): INR, PTP, APTT in the last 72 hours. No lab exists for component: INREXT, INREXT   No results for input(s): FE, TIBC, PSAT, FERR in the last 72 hours. No results found for: FOL, RBCF   No results for input(s): PH, PCO2, PO2 in the last 72 hours. No results for input(s): CPK, CKNDX, TROIQ in the last 72 hours.     No lab exists for component: CPKMB  No results found for: CHOL, CHOLX, CHLST, CHOLV, HDL, HDLP, LDL, LDLC, DLDLP, TGLX, TRIGL, TRIGP, CHHD, CHHDX  No results found for: [facility]  Lab Results   Component Value Date/Time    Color YELLOW/STRAW 09/08/2019 06:38 PM    Appearance CLEAR 09/08/2019 06:38 PM    Specific gravity 1.008 09/08/2019 06:38 PM    pH (UA) 7.5 09/08/2019 06:38 PM    Protein NEGATIVE  09/08/2019 06:38 PM    Glucose NEGATIVE  09/08/2019 06:38 PM    Ketone NEGATIVE  09/08/2019 06:38 PM    Bilirubin NEGATIVE  09/08/2019 06:38 PM    Urobilinogen 0.2 09/08/2019 06:38 PM    Nitrites NEGATIVE  09/08/2019 06:38 PM    Leukocyte Esterase NEGATIVE  09/08/2019 06:38 PM    Epithelial cells FEW 09/08/2019 06:38 PM    Bacteria NEGATIVE  09/08/2019 06:38 PM    WBC 0-4 09/08/2019 06:38 PM    RBC 0-5 09/08/2019 06:38 PM Medications Reviewed:     Current Facility-Administered Medications   Medication Dose Route Frequency    influenza vaccine 2019-20 (6 mos+)(PF) (FLUARIX/FLULAVAL/FLUZONE QUAD) injection 0.5 mL  0.5 mL IntraMUSCular PRIOR TO DISCHARGE    HYDROmorphone (PF) (DILAUDID) injection 0.5 mg  0.5 mg IntraVENous Q4H PRN    lactated Ringers infusion  75 mL/hr IntraVENous CONTINUOUS    acetaminophen (TYLENOL) tablet 1,000 mg  1,000 mg Oral Q6H    alvimopan (ENTEREG) capsule 12 mg  12 mg Oral BID    [START ON 9/18/2019] celecoxib (CELEBREX) capsule 100 mg  100 mg Oral BID    naloxone (NARCAN) injection 0.4 mg  0.4 mg IntraVENous PRN    oxyCODONE IR (ROXICODONE) tablet 5 mg  5 mg Oral Q4H PRN    ketorolac (TORADOL) injection 15 mg  15 mg IntraVENous Q6H    lidocaine 8 mg/mL (Xylocaine) infusion  1 mg/kg/hr (Ideal) IntraVENous CONTINUOUS    ondansetron (ZOFRAN ODT) tablet 4 mg  4 mg Oral Q4H PRN    enoxaparin (LOVENOX) injection 40 mg  40 mg SubCUTAneous Q24H    acetaminophen (TYLENOL) tablet 650 mg  650 mg Oral Q6H PRN    0.9% sodium chloride infusion  75 mL/hr IntraVENous CONTINUOUS    oxyCODONE-acetaminophen (PERCOCET) 5-325 mg per tablet 1 Tab  1 Tab Oral Q6H PRN    piperacillin-tazobactam (ZOSYN) 3.375 g in 0.9% sodium chloride (MBP/ADV) 100 mL  3.375 g IntraVENous Q8H    sodium chloride (NS) flush 5-10 mL  5-10 mL IntraVENous PRN    sodium chloride (NS) flush 5-40 mL  5-40 mL IntraVENous Q8H    sodium chloride (NS) flush 5-40 mL  5-40 mL IntraVENous PRN    ondansetron (ZOFRAN) injection 4 mg  4 mg IntraVENous Q4H PRN    mesalamine DR (ASACOL HD) tablet 1,600 mg  1,600 mg Oral TID    escitalopram oxalate (LEXAPRO) tablet 20 mg  20 mg Oral DAILY    ferrous sulfate tablet 325 mg  1 Tab Oral DAILY WITH BREAKFAST     ______________________________________________________________________  EXPECTED LENGTH OF STAY: 4d 19h  ACTUAL LENGTH OF STAY:          9                 Marsha Belcher MD Patient's emergency contacts:  Extended Emergency Contact Information  Primary Emergency Contact: [address]  Mobile Phone: [phone]  Relation: Mother

## 2019-09-17 NOTE — ANESTHESIA POSTPROCEDURE EVALUATION
Post-Anesthesia Evaluation and Assessment    Patient: Shagufta Vieyra MRN: [id number]  SSN: [id number]    YOB: 1995  Age: 26 y.o. Sex: female      I have evaluated the patient and they are stable and ready for discharge from the PACU. Cardiovascular Function/Vital Signs  Visit Vitals  /69   Pulse 84   Temp 36.7 °C (98 °F)   Resp 14   Ht 5' 5\" (1.651 m)   Wt 66.3 kg (146 lb 1.6 oz)   SpO2 95%   Breastfeeding? No   BMI 24.31 kg/m²       Patient is status post General anesthesia for Procedure(s):  ROBOTIC ILEOCOLIC RESECTION WITH REPAIR OF SEROSAL TEAR. Nausea/Vomiting: None    Postoperative hydration reviewed and adequate. Pain:  Pain Scale 1: Numeric (0 - 10) (09/16/19 1348)  Pain Intensity 1: 3 (09/16/19 1348)   Managed    Neurological Status:   Neuro  Neurologic State: Alert (09/16/19 3296)  Orientation Level: Oriented X4 (09/16/19 9671)  Cognition: Appropriate decision making; Appropriate for age attention/concentration; Appropriate safety awareness; Follows commands (09/16/19 7748)  Speech: Clear (09/16/19 0942)  Assessment L Pupil: Brisk (09/14/19 0045)  Assessment R Pupil: Brisk (09/14/19 0045)  LUE Motor Response: Purposeful (09/16/19 0942)  LLE Motor Response: Purposeful (09/16/19 0942)  RUE Motor Response: Purposeful (09/16/19 0942)  RLE Motor Response: Purposeful (09/16/19 0942)   At baseline    Mental Status, Level of Consciousness: Alert and  oriented to person, place, and time    Pulmonary Status:   O2 Device: Room air (09/16/19 2002)   Adequate oxygenation and airway patent    Complications related to anesthesia: None    Post-anesthesia assessment completed. No concerns    Signed By: Yoandy Diallo MD     September 16, 2019              Procedure(s):  ROBOTIC ILEOCOLIC RESECTION WITH REPAIR OF SEROSAL TEAR.     general    <BSHSIANPOST>    Vitals Value Taken Time   /69 9/16/2019  8:02 PM   Temp 36.7 °C (98 °F) 9/16/2019  8:02 PM   Pulse 78 9/16/2019  8:03 PM   Resp 15 9/16/2019  8:03 PM   SpO2 95 % 9/16/2019  8:03 PM   Vitals shown include unvalidated device data.

## 2019-09-17 NOTE — OP NOTES
[address]   OPERATIVE REPORT    Name:  Neeta Renteria  MR#:  [id number]  :  1995  ACCOUNT #:  [de-identified]  DATE OF SERVICE:    PREOPERATIVE DIAGNOSES:  Ileocolic, Crohn's disease with an abscess. POSTOPERATIVE DIAGNOSES:  Ileocolic, Crohn's disease with an abscess plus impending fistula and right ovarian cyst.    PROCEDURE PERFORMED:  Robotic ileocolic resection. SURGEON:  Desiree Park MD.    ASSISTANT:  SA.    ANESTHESIA:  General plus 30 mL of 0.25% Marcaine with epinephrine. COMPLICATIONS:  None. SPECIMENS REMOVED:  Ileocolic resection with abscess and microbiology of ileocolic abscess, was sent to pathology. IMPLANTS:  None. ESTIMATED BLOOD LOSS:  350 mL. DISPOSITION:  The patient tolerated procedure well and was transferred to recovery room in stable condition. FINDINGS:  Ileocolic stricture and impending fistula to a loop of small bowel, right ovarian systems too. INDICATIONS:  This is a 29-year-old female with a history of Crohn's disease. She has had a couple of episodes of abscess formation in this location. Risks, benefits, and alternatives of robotic ileocolic resection were discussed with her and her mother. She was taken to the operating room and placed on the table in normal supine position. We elected to go forward with the operation because she was not improving symptomatically and she continued to have pain. The abscess was not drainable given the location and therefore, we elected to go ahead and do the operation. She had been maintained on antibiotics for quite a while but that did not seem to make much of an improvement in the appearance of her Crohn's disease. Also, she had a significant stricture and an impending fistula and therefore, we elected to take her to the operating room. PROCEDURE:  She was consented, taken to the operating room, placed on the table in normal supine position.   After smooth induction of anesthesia, the abdomen and perineum were prepped and draped in the usual fashion. The patient was placed on lithotomy position. Hicks catheter was placed on table in the event we would need to have a urologist come and evaluate her bladder. Time-out was performed. I made a Pfannenstiel incision after injecting 0.25% Marcaine with epinephrine. A mini GelPort was placed, taking care to avoid injuring underlying bowel. The abdomen was insufflated with CO2 to create pneumoperitoneum. An 8-mm trocar was placed in the periumbilical region and an 8-mm trocar was placed in left upper quadrant. An 8-mm AirSeal was placed in left lateral position. The patient was placed in steep Trendelenburg position with the right side up and the robot was docked. She had an omentum, which was stuck to the anterior abdominal wall down of the pelvis which was layered over the bladder. She had a large right ovarian cyst and the right ovary was stuck into the phlegmon where another loop of small bowel was as well. Her terminal ileum was trying to fistulize to this loop of small bowel and I was able to peel the small bowel out of there. I was able to repair the serosal tear with 3-0 Vicryl in an interrupted Lembert fashion. I then took the bowel where it was viable and appeared normal without any creeping fat on the terminal ileum. I used a blue load on the robotic stapler to transect this. I found normal ascending colon and took a blue load on a robotic stapler across this. The mesentery was very friable and it bled easily. We lost a couple hundred milliliters of blood in this in trying to get the mesentery transected. We went very slowly with vessel sealer and fenestrated bipolar. Once the mesentery was taken, the specimen was removed out of the way. The mesentery was then completely hemostatic. We were moping up the rest of the bleeding and the blood that had been spilled during the resection.   I then created a side-to-side functional end-to-end anastomosis in an antiperistaltic fashion making sure that the mesenteries were flat. I made a colotomy and an enterotomy after lining up the bowel with 2-0 silk suture. I then used a 60-mm blue load stapler to create a common channel. I closed the common enterotomy with a 2-0 V-Loc in a running fashion. This was done in 2 layers with a full-thickness layer followed by an imbricating Lembert sutured layer. I then closed the mesenteric defect with 3-0 Vicryl in a running locking fashion. I irrigated the abdomen and anastomosis was sprayed with aerosolized Tisseel. The abdomen was irrigated copiously with saline and we attempted to get most of the blood out. After I was taking down the mesentery, we did get into an abscess which we sent for culture. The specimen was then removed through Pfannenstiel incision. The abdomen was irrigated copiously with saline. The incision was closed using 3-0 Vicryl to close the peritoneum, #1 PDS to close the fascia. The incisions were all irrigated and closed with 4-0 Monocryl in subcuticular fashion. Dermabond was used as a sterile dressing. The patient tolerated the procedure well and was transferred to the recovery room in stable condition.       MD CASE [place]/[id number]  D:  09/16/2019 19:59  T:  09/17/2019 6:49  JOB #:  [id number]

## 2019-09-17 NOTE — PROGRESS NOTES
TRANSFER - OUT REPORT:    Verbal report given to Tay rn(name) on Angelika Warren  being transferred to [facility](unit) for routine progression of care       Report consisted of patients Situation, Background, Assessment and   Recommendations(SBAR). Time Pre op antibiotic given:1620  Anesthesia Stop time: 2001  Hicks Present on Transfer to floor:y  Order for Hicks on Chart:y  Discharge Prescriptions with Chart:    Information from the following report(s) SBAR, Kardex, OR Summary, Procedure Summary, Intake/Output and MAR was reviewed with the receiving nurse. Opportunity for questions and clarification was provided. Is the patient on 02? YES       L/Min 1       Other     Is the patient on a monitor? NO    Is the nurse transporting with the patient? YES    Surgical Waiting Area notified of patient's transfer from PACU? YES      The following personal items collected during your admission accompanied patient upon transfer:   Dental Appliance: Dental Appliances: None  Vision: Visual Aid: None  Hearing Aid:    Jewelry: Jewelry: Body Piercing, With patient(SASHA NIPPLE PIERCINGS - TAPED & CONSENT SIGNED)  Clothing: Clothing: None  Other Valuables:  Other Valuables: None  Valuables sent to safe: Personal Items Sent to Safe: none

## 2019-09-17 NOTE — PROGRESS NOTES
CIERRA plan:    -Patient transferred to this floor s/p robotic ileocolic resection with repair of serosal tear  -ID continues to follow  -Patient will return home with family  -No CM needs identified at this time    S.  [employer] [facility]

## 2019-09-18 LAB
ANION GAP SERPL CALC-SCNC: 5 MMOL/L (ref 5–15)
BASOPHILS # BLD: 0 K/UL (ref 0–0.1)
BASOPHILS NFR BLD: 0 % (ref 0–1)
BUN SERPL-MCNC: 6 MG/DL (ref 6–20)
BUN/CREAT SERPL: 13 (ref 12–20)
CALCIUM SERPL-MCNC: 7.9 MG/DL (ref 8.5–10.1)
CHLORIDE SERPL-SCNC: 109 MMOL/L (ref 97–108)
CO2 SERPL-SCNC: 27 MMOL/L (ref 21–32)
CREAT SERPL-MCNC: 0.47 MG/DL (ref 0.55–1.02)
DIFFERENTIAL METHOD BLD: ABNORMAL
EOSINOPHIL # BLD: 0.1 K/UL (ref 0–0.4)
EOSINOPHIL NFR BLD: 1 % (ref 0–7)
ERYTHROCYTE [DISTWIDTH] IN BLOOD BY AUTOMATED COUNT: 12.9 % (ref 11.5–14.5)
GLUCOSE SERPL-MCNC: 83 MG/DL (ref 65–100)
HCT VFR BLD AUTO: 24 % (ref 35–47)
HGB BLD-MCNC: 7.1 G/DL (ref 11.5–16)
IMM GRANULOCYTES # BLD AUTO: 0 K/UL
IMM GRANULOCYTES NFR BLD AUTO: 0 %
LYMPHOCYTES # BLD: 0.2 K/UL (ref 0.8–3.5)
LYMPHOCYTES NFR BLD: 3 % (ref 12–49)
MCH RBC QN AUTO: 25.2 PG (ref 26–34)
MCHC RBC AUTO-ENTMCNC: 29.6 G/DL (ref 30–36.5)
MCV RBC AUTO: 85.1 FL (ref 80–99)
MONOCYTES # BLD: 0.3 K/UL (ref 0–1)
MONOCYTES NFR BLD: 5 % (ref 5–13)
MYELOCYTES NFR BLD MANUAL: 1 %
NEUTS SEG # BLD: 4.7 K/UL (ref 1.8–8)
NEUTS SEG NFR BLD: 90 % (ref 32–75)
NRBC # BLD: 0 K/UL (ref 0–0.01)
NRBC BLD-RTO: 0 PER 100 WBC
PLATELET # BLD AUTO: 246 K/UL (ref 150–400)
PLATELET COMMENTS,PCOM: ABNORMAL
PMV BLD AUTO: 9.5 FL (ref 8.9–12.9)
POTASSIUM SERPL-SCNC: 4 MMOL/L (ref 3.5–5.1)
RBC # BLD AUTO: 2.82 M/UL (ref 3.8–5.2)
RBC MORPH BLD: ABNORMAL
SODIUM SERPL-SCNC: 141 MMOL/L (ref 136–145)
WBC # BLD AUTO: 5.2 K/UL (ref 3.6–11)

## 2019-09-18 PROCEDURE — C9113 INJ PANTOPRAZOLE SODIUM, VIA: HCPCS | Performed by: INTERNAL MEDICINE

## 2019-09-18 PROCEDURE — 74011250636 HC RX REV CODE- 250/636: Performed by: INTERNAL MEDICINE

## 2019-09-18 PROCEDURE — 74011000250 HC RX REV CODE- 250: Performed by: INTERNAL MEDICINE

## 2019-09-18 PROCEDURE — 74011250637 HC RX REV CODE- 250/637: Performed by: COLON & RECTAL SURGERY

## 2019-09-18 PROCEDURE — 85025 COMPLETE CBC W/AUTO DIFF WBC: CPT

## 2019-09-18 PROCEDURE — 74011000258 HC RX REV CODE- 258: Performed by: INTERNAL MEDICINE

## 2019-09-18 PROCEDURE — 36415 COLL VENOUS BLD VENIPUNCTURE: CPT

## 2019-09-18 PROCEDURE — 74011250636 HC RX REV CODE- 250/636: Performed by: COLON & RECTAL SURGERY

## 2019-09-18 PROCEDURE — 80048 BASIC METABOLIC PNL TOTAL CA: CPT

## 2019-09-18 PROCEDURE — 74011250637 HC RX REV CODE- 250/637: Performed by: PHYSICIAN ASSISTANT

## 2019-09-18 PROCEDURE — 65270000029 HC RM PRIVATE

## 2019-09-18 PROCEDURE — 74011250637 HC RX REV CODE- 250/637: Performed by: INTERNAL MEDICINE

## 2019-09-18 RX ORDER — OXYCODONE HYDROCHLORIDE 5 MG/1
10 TABLET ORAL
Status: DISCONTINUED | OUTPATIENT
Start: 2019-09-18 | End: 2019-09-21 | Stop reason: HOSPADM

## 2019-09-18 RX ADMIN — ENOXAPARIN SODIUM 40 MG: 40 INJECTION SUBCUTANEOUS at 23:25

## 2019-09-18 RX ADMIN — OXYCODONE HYDROCHLORIDE 10 MG: 5 TABLET ORAL at 23:41

## 2019-09-18 RX ADMIN — ONDANSETRON 4 MG: 4 TABLET, ORALLY DISINTEGRATING ORAL at 09:22

## 2019-09-18 RX ADMIN — FERROUS SULFATE TAB 325 MG (65 MG ELEMENTAL FE) 325 MG: 325 (65 FE) TAB at 07:23

## 2019-09-18 RX ADMIN — Medication 10 ML: at 06:01

## 2019-09-18 RX ADMIN — HYDROMORPHONE HYDROCHLORIDE 1 MG: 1 INJECTION, SOLUTION INTRAMUSCULAR; INTRAVENOUS; SUBCUTANEOUS at 21:30

## 2019-09-18 RX ADMIN — ESCITALOPRAM OXALATE 20 MG: 10 TABLET ORAL at 09:35

## 2019-09-18 RX ADMIN — ALVIMOPAN 12 MG: 12 CAPSULE ORAL at 09:35

## 2019-09-18 RX ADMIN — CELECOXIB 100 MG: 100 CAPSULE ORAL at 09:36

## 2019-09-18 RX ADMIN — MESALAMINE 1600 MG: 800 TABLET, DELAYED RELEASE ORAL at 16:16

## 2019-09-18 RX ADMIN — Medication 10 ML: at 21:36

## 2019-09-18 RX ADMIN — PIPERACILLIN SODIUM,TAZOBACTAM SODIUM 3.38 G: 3; .375 INJECTION, POWDER, FOR SOLUTION INTRAVENOUS at 18:13

## 2019-09-18 RX ADMIN — PIPERACILLIN SODIUM,TAZOBACTAM SODIUM 3.38 G: 3; .375 INJECTION, POWDER, FOR SOLUTION INTRAVENOUS at 00:54

## 2019-09-18 RX ADMIN — OXYCODONE HYDROCHLORIDE 5 MG: 5 TABLET ORAL at 06:01

## 2019-09-18 RX ADMIN — CELECOXIB 100 MG: 100 CAPSULE ORAL at 18:12

## 2019-09-18 RX ADMIN — ACETAMINOPHEN 1000 MG: 500 TABLET ORAL at 23:24

## 2019-09-18 RX ADMIN — PIPERACILLIN SODIUM,TAZOBACTAM SODIUM 3.38 G: 3; .375 INJECTION, POWDER, FOR SOLUTION INTRAVENOUS at 09:35

## 2019-09-18 RX ADMIN — ONDANSETRON 4 MG: 2 INJECTION INTRAMUSCULAR; INTRAVENOUS at 12:50

## 2019-09-18 RX ADMIN — ACETAMINOPHEN 1000 MG: 500 TABLET ORAL at 12:40

## 2019-09-18 RX ADMIN — HYDROMORPHONE HYDROCHLORIDE 1 MG: 1 INJECTION, SOLUTION INTRAMUSCULAR; INTRAVENOUS; SUBCUTANEOUS at 16:16

## 2019-09-18 RX ADMIN — Medication 10 ML: at 14:02

## 2019-09-18 RX ADMIN — HYDROMORPHONE HYDROCHLORIDE 1 MG: 1 INJECTION, SOLUTION INTRAMUSCULAR; INTRAVENOUS; SUBCUTANEOUS at 04:49

## 2019-09-18 RX ADMIN — ACETAMINOPHEN 1000 MG: 500 TABLET ORAL at 18:10

## 2019-09-18 RX ADMIN — OXYCODONE HYDROCHLORIDE 5 MG: 5 TABLET ORAL at 09:45

## 2019-09-18 RX ADMIN — SODIUM CHLORIDE 40 MG: 9 INJECTION INTRAMUSCULAR; INTRAVENOUS; SUBCUTANEOUS at 16:16

## 2019-09-18 RX ADMIN — OXYCODONE HYDROCHLORIDE 10 MG: 5 TABLET ORAL at 18:10

## 2019-09-18 RX ADMIN — ACETAMINOPHEN 1000 MG: 500 TABLET ORAL at 00:44

## 2019-09-18 RX ADMIN — ACETAMINOPHEN 1000 MG: 500 TABLET ORAL at 06:01

## 2019-09-18 RX ADMIN — MESALAMINE 1600 MG: 800 TABLET, DELAYED RELEASE ORAL at 09:36

## 2019-09-18 RX ADMIN — MESALAMINE 1600 MG: 800 TABLET, DELAYED RELEASE ORAL at 21:29

## 2019-09-18 RX ADMIN — OXYCODONE HYDROCHLORIDE 5 MG: 5 TABLET ORAL at 14:01

## 2019-09-18 RX ADMIN — HYDROMORPHONE HYDROCHLORIDE 1 MG: 1 INJECTION, SOLUTION INTRAMUSCULAR; INTRAVENOUS; SUBCUTANEOUS at 12:40

## 2019-09-18 RX ADMIN — OXYCODONE HYDROCHLORIDE 5 MG: 5 TABLET ORAL at 00:44

## 2019-09-18 RX ADMIN — HYDROMORPHONE HYDROCHLORIDE 1 MG: 1 INJECTION, SOLUTION INTRAMUSCULAR; INTRAVENOUS; SUBCUTANEOUS at 08:21

## 2019-09-18 NOTE — PROGRESS NOTES
Hospitalist Progress Note  Sage Espinoza MD  Answering service: [phone] OR [phone] from in house phone        Date of Service:  2019  NAME:  Daniela Gonzalez  :  1995  MRN:  [id number]  PCP: Elenita Montiel MD    Chief Complaint:   Chief Complaint   Patient presents with    Inflammatory Bowel Disease    Abdominal Pain    Fever    Melena         Admission Summary:     Daniela Gonzalez is a 26 y.o. female who states that she was diagnosed with Crohns disease at the age of 17. Comes in with abdominal pain due to intra-abdominal abscess    Interval history / Subjective:   Pt seen for FU of CC: CD and ileocecal abscess  Cont to complain of pain despite meds adjusted by Gen Surg  Poor oral intake, anemic today but no obvious bleeding  No NVD, passing flatus     Assessment & Plan:     Sepsis with intra-abdominal abscess - improving   Secondary to Crohn's disease    - CT abd: reviewed  - IR consulted for abscess drainage but it was not done due ot possibility of adhesion  - s/p ileocolic resection , biopsy shows abscess  - stool cx positive for P. Shigelloides   - ID on board  -  c/w IV zosyn. 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  S/p Laparoscopic Robotic ileocolic resection and repair for serosal tear 19  - Currently on humira - outpt   - c/w asacol   - GI following  - post op care as per surgical team    Large right ovarian cyst  DW patient, she will need to FU with Ob/GYN, and will need a pelvic US likely as OP    Acute blood loss anemia sec to clay-operative blood loss  Repeat labs in am  Transfuse PRN  No obvious bleeding  Add PPI to prevent stress ulcers  Iron supplementation    Depression- lexapro     Code status: Full Code  DVT prophylaxis: mechanical prophylaxis  Care Plan discussed with: Patient/Family  Disposition: Home w/Family and TBD.      Hospital Problems  Date Reviewed: 2019          Codes Class Noted POA    * (Principal) Abscess of abdominal cavity ([facility] ICD-10-CM: K65.1  ICD-9-CM: 567.22  2019 Yes        Crohn disease ([facility] ICD-10-CM: K50.90  ICD-9-CM: 555.9  2016 Yes                Review of Systems:   A comprehensive review of systems was negative except for that written in the HPI. Physical Examination:     Visit Vitals  /71 (BP 1 Location: Right arm, BP Patient Position: At rest)   Pulse 80   Temp 98.2 °F (36.8 °C)   Resp 16   Ht 5' 5\" (1.651 m)   Wt 68.7 kg (151 lb 6.4 oz)   SpO2 99%   Breastfeeding? No   BMI 25.19 kg/m²     General appearance: NAD, pale, resting, pleasant  Head: Normocephalic, without obvious abnormality, atraumatic  Eyes: conjunctivae/corneas clear. Lungs: CTA BL, No R/R/W  Heart: RRR,S1S2  Abdomen: soft, surgical sites are clean, mild TTP around the surgical site, BS+  Neurologic: Grossly normal    Vital Signs:    Last 24hrs VS reviewed since prior progress note. Most recent are:    Visit Vitals  /71 (BP 1 Location: Right arm, BP Patient Position: At rest)   Pulse 80   Temp 98.2 °F (36.8 °C)   Resp 16   Ht 5' 5\" (1.651 m)   Wt 68.7 kg (151 lb 6.4 oz)   SpO2 99%   Breastfeeding? No   BMI 25.19 kg/m²         Intake/Output Summary (Last 24 hours) at 2019 1346  Last data filed at 2019 2307  Gross per 24 hour   Intake 2705.28 ml   Output    Net 2705.28 ml        Tmax:  Temp (24hrs), Av.8 °F (36.6 °C), Min:97.5 °F (36.4 °C), Max:98.2 °F (36.8 °C)      Data Review:   Data reviewed by myself:  Ct Abd Pelv W Cont    Result Date: 2019  EXAM:  CT abdomen pelvis with contrast INDICATION: Abdominal pain and fever. Inflammatory bowel disease. COMPARISON: None. TECHNIQUE: Helical CT of the abdomen  and pelvis  following the uneventful intravenous administration of nonionic contrast.  Coronal and sagittal reformats are performed.  CT dose reduction was achieved through use of a standardized protocol tailored for this examination and automatic exposure control for dose modulation. Adaptive statistical iterative reconstruction (ASIR) was utilized. FINDINGS: The visualized lung bases demonstrate no mass or consolidation. The heart size is normal. There is no pericardial or pleural effusion. The liver, spleen, pancreas, and adrenal glands are normal. The gall bladder is present  without intra- or extra-hepatic biliary dilatation. The kidneys are symmetric without hydronephrosis. There is marked inflammation involving small bowel loops in the right lower abdomen where there is distal ileum thickening and adjacent inflammation as well as mucosal hyperenhancement. 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 Enhancing but nonenlarged right lower mesenteric lymph nodes are likely reactive. There are no dilated bowel loops. The appendix is unremarkable. There are no enlarged lymph nodes. There is no free air. The urinary bladder is normal.  There is no pelvic mass. An IUD is present. The bony structures are age-appropriate. IMPRESSION: Marked inflammatory changes involving the distal small bowel loops in the right lower quadrant in keeping with history of Crohn disease. 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 No bowel obstruction. Normal appendix. Xr Chest Port    Result Date: 9/8/2019  EXAM:  CR chest portable INDICATION: Fever. Abdominal pain. COMPARISON: None. TECHNIQUE: Portable AP upright chest view at 1956 hours FINDINGS: Cardiac monitoring wires overlie the thorax. The cardiomediastinal contours are within normal limits. The lungs and pleural spaces are clear. There is no pneumothorax. The bones and upper abdomen are unremarkable. IMPRESSION: No acute process.      No results found for: SDES  Lab Results   Component Value Date/Time    Culture result: NO GROWTH AFTER 17 HOURS 09/16/2019 06:50 PM    Culture result: NO GROWTH 6 DAYS 09/08/2019 06:25 PM    Culture result: NO GROWTH 6 DAYS 09/08/2019 06:25 PM     All Micro Results     Procedure Component Value Units Date/Time    CULTURE, Render Pancoast STAIN [[id number]] Collected:  09/16/19 1850    Order Status:  Completed Specimen:  Misc.  Wound Updated:  09/17/19 1519     Special Requests: ILEOCLIC ABSCESS, LOOK FPR ANAEROBES     GRAM STAIN RARE WBCS SEEN         NO ORGANISMS SEEN        Culture result: NO GROWTH AFTER 17 HOURS       CULTURE, BLOOD [[id number]] Collected:  09/08/19 1825    Order Status:  Completed Specimen:  Blood Updated:  09/14/19 0610     Special Requests: NO SPECIAL REQUESTS        Culture result: NO GROWTH 6 DAYS       CULTURE, BLOOD [[id number]] Collected:  09/08/19 1825    Order Status:  Completed Specimen:  Blood Updated:  09/14/19 0610     Special Requests: NO SPECIAL REQUESTS        Culture result: NO GROWTH 6 DAYS       CULTURE, STOOL [[id number]]  (Abnormal) Collected:  09/09/19 1114    Order Status:  Completed Specimen:  Stool Updated:  09/11/19 1553     Shigella species, DNA NEGATIVE         Campylobacter species, DNA NEGATIVE         Vibrio species, DNA NEGATIVE         Enterotoxigen E Coli, DNA NEGATIVE         Shiga toxin producing, DNA NEGATIVE         Salmonella species, DNA NEGATIVE         P. shigelloides, DNA POSITIVE        Y. enterocolitica, DNA NEGATIVE        C. DIFFICILE AG & TOXIN A/B [[id number]] Collected:  09/10/19 1114    Order Status:  Completed Specimen:  Stool Updated:  09/11/19 1204     [address] ANTIGEN NEGATIVE         C. difficile toxin NEGATIVE         INTERPRETATION       NEGATIVE FOR TOXIGENIC C. DIFFICILE          OVA & PARASITES, STOOL [[id number]] Collected:  09/10/19 1114    Order Status:  Completed Specimen:  Stool Updated:  09/10/19 1145    URINE CULTURE HOLD SAMPLE [[id number]] Collected:  09/08/19 1838    Order Status:  Completed Specimen:  Urine from Serum Updated:  09/08/19 1846     Urine culture hold       URINE ON HOLD IN MICROBIOLOGY DEPT FOR 3 DAYS. IF UNPRESERVED URINE IS SUBMITTED, IT CANNOT BE USED FOR ADDITIONAL TESTING AFTER 24 HRS, RECOLLECTION WILL BE REQUIRED. Labs: reviewed by myself. Recent Labs     09/18/19 0612 09/17/19 0215   WBC 5.2 8.4   HGB 7.1* 9.5*   HCT 24.0* 30.3*    338     Recent Labs     09/18/19  0612 09/17/19 0215 09/16/19  0314    139 140   K 4.0 3.8 4.4   * 104 106   CO2 27 27 24   BUN 6 8 7   CREA 0.47* 0.72 0.62   GLU 83 157* 80   CA 7.9* 8.7 8.5   MG  --  2.4  --    PHOS  --  3.2  --      No results for input(s): SGOT, GPT, ALT, AP, TBIL, TBILI, TP, ALB, GLOB, GGT, AML, LPSE in the last 72 hours. No lab exists for component: AMYP, HLPSE  No results for input(s): INR, PTP, APTT in the last 72 hours. No lab exists for component: INREXT, INREXT   No results for input(s): FE, TIBC, PSAT, FERR in the last 72 hours. No results found for: FOL, RBCF   No results for input(s): PH, PCO2, PO2 in the last 72 hours. No results for input(s): CPK, CKNDX, TROIQ in the last 72 hours.     No lab exists for component: CPKMB  No results found for: CHOL, CHOLX, CHLST, CHOLV, HDL, HDLP, LDL, LDLC, DLDLP, TGLX, TRIGL, TRIGP, CHHD, CHHDX  No results found for: [facility]  Lab Results   Component Value Date/Time    Color YELLOW/STRAW 09/08/2019 06:38 PM    Appearance CLEAR 09/08/2019 06:38 PM    Specific gravity 1.008 09/08/2019 06:38 PM    pH (UA) 7.5 09/08/2019 06:38 PM    Protein NEGATIVE  09/08/2019 06:38 PM    Glucose NEGATIVE  09/08/2019 06:38 PM    Ketone NEGATIVE  09/08/2019 06:38 PM    Bilirubin NEGATIVE  09/08/2019 06:38 PM    Urobilinogen 0.2 09/08/2019 06:38 PM    Nitrites NEGATIVE  09/08/2019 06:38 PM    Leukocyte Esterase NEGATIVE  09/08/2019 06:38 PM    Epithelial cells FEW 09/08/2019 06:38 PM    Bacteria NEGATIVE  09/08/2019 06:38 PM    WBC 0-4 09/08/2019 06:38 PM    RBC 0-5 09/08/2019 06:38 PM         Medications Reviewed:     Current Facility-Administered Medications   Medication Dose Route Frequency    influenza vaccine 2019-20 (6 mos+)(PF) (FLUARIX/FLULAVAL/FLUZONE QUAD) injection 0.5 mL  0.5 mL IntraMUSCular PRIOR TO DISCHARGE    HYDROmorphone (PF) (DILAUDID) injection 1 mg  1 mg IntraVENous Q3H PRN    acetaminophen (TYLENOL) tablet 1,000 mg  1,000 mg Oral Q6H    alvimopan (ENTEREG) capsule 12 mg  12 mg Oral BID    celecoxib (CELEBREX) capsule 100 mg  100 mg Oral BID    naloxone (NARCAN) injection 0.4 mg  0.4 mg IntraVENous PRN    oxyCODONE IR (ROXICODONE) tablet 5 mg  5 mg Oral Q4H PRN    lidocaine 8 mg/mL (Xylocaine) infusion  1 mg/kg/hr (Ideal) IntraVENous CONTINUOUS    ondansetron (ZOFRAN ODT) tablet 4 mg  4 mg Oral Q4H PRN    enoxaparin (LOVENOX) injection 40 mg  40 mg SubCUTAneous Q24H    acetaminophen (TYLENOL) tablet 650 mg  650 mg Oral Q6H PRN    0.9% sodium chloride infusion  75 mL/hr IntraVENous CONTINUOUS    oxyCODONE-acetaminophen (PERCOCET) 5-325 mg per tablet 1 Tab  1 Tab Oral Q6H PRN    piperacillin-tazobactam (ZOSYN) 3.375 g in 0.9% sodium chloride (MBP/ADV) 100 mL  3.375 g IntraVENous Q8H    sodium chloride (NS) flush 5-10 mL  5-10 mL IntraVENous PRN    sodium chloride (NS) flush 5-40 mL  5-40 mL IntraVENous Q8H    sodium chloride (NS) flush 5-40 mL  5-40 mL IntraVENous PRN    ondansetron (ZOFRAN) injection 4 mg  4 mg IntraVENous Q4H PRN    mesalamine DR (ASACOL HD) tablet 1,600 mg  1,600 mg Oral TID    escitalopram oxalate (LEXAPRO) tablet 20 mg  20 mg Oral DAILY    ferrous sulfate tablet 325 mg  1 Tab Oral DAILY WITH BREAKFAST     ______________________________________________________________________  EXPECTED LENGTH OF STAY: 4d 19h  ACTUAL LENGTH OF STAY:          10                 Loreto Wesley MD     Patient's emergency contacts:  Extended Emergency Contact Information  Primary Emergency Contact: [address] [place] [address] Phone: [phone]  Relation: Mother

## 2019-09-18 NOTE — PROGRESS NOTES
Bedside shift change report given to Bruce Marks (oncoming nurse) by Karlee Brothers (offgoing nurse). Report included the following information SBAR, Kardex and MAR.

## 2019-09-18 NOTE — PROGRESS NOTES
General Daily Progress Note    Admit Date: 9/8/2019  2 Days Post-Op   Subjective:     Patient continues to have trouble with pain control. Also having intermittent nausea. Is passing gas  States that she has been having multiple bowel movements overnight and during the day today. Has been OOB to the bathroom but no walking in the hallway. Still no appetite. Tolerating fluids. Objective:     Patient Vitals for the past 24 hrs:   BP Temp Pulse Resp SpO2 Weight   09/18/19 1509 118/68 98.4 °F (36.9 °C) 68 18 98 %    09/18/19 0822 108/71 98.2 °F (36.8 °C) 80 16 99 %    09/18/19 0554      68.7 kg (151 lb 6.4 oz)   09/17/19 2254 108/67 97.6 °F (36.4 °C) 73 18 97 %      Patient Vitals for the past 8 hrs:   BP Temp Pulse Resp SpO2   09/18/19 1509 118/68 98.4 °F (36.9 °C) 68 18 98 %     No intake/output data recorded. 09/16 1901 - 09/18 0700  In: 2905.3 [P.O.:400; I.V.:2505.3]  Out: 2000 [Urine:1600]    Physical Exam:   Alert and oriented. No distress  Abdomen soft. Expected tenderness. Incisions clean and dry          Data Review   Recent Results (from the past 24 hour(s))   CBC WITH AUTOMATED DIFF    Collection Time: 09/18/19  6:12 AM   Result Value Ref Range    WBC 5.2 3.6 - 11.0 K/uL    RBC 2.82 (L) 3.80 - 5.20 M/uL    HGB 7.1 (L) 11.5 - 16.0 g/dL    HCT 24.0 (L) 35.0 - 47.0 %    MCV 85.1 80.0 - 99.0 FL    MCH 25.2 (L) 26.0 - 34.0 PG    MCHC 29.6 (L) 30.0 - 36.5 g/dL    RDW 12.9 11.5 - 14.5 %    PLATELET 885 323 - 840 K/uL    MPV 9.5 8.9 - 12.9 FL    NRBC 0.0 0  WBC    ABSOLUTE NRBC 0.00 0.00 - 0.01 K/uL    NEUTROPHILS 90 (H) 32 - 75 %    LYMPHOCYTES 3 (L) 12 - 49 %    MONOCYTES 5 5 - 13 %    EOSINOPHILS 1 0 - 7 %    BASOPHILS 0 0 - 1 %    MYELOCYTES 1 (H) 0 %    IMMATURE GRANULOCYTES 0 %    ABS. NEUTROPHILS 4.7 1.8 - 8.0 K/UL    ABS. LYMPHOCYTES 0.2 (L) 0.8 - 3.5 K/UL    ABS. MONOCYTES 0.3 0.0 - 1.0 K/UL    ABS. EOSINOPHILS 0.1 0.0 - 0.4 K/UL    ABS. BASOPHILS 0.0 0.0 - 0.1 K/UL    ABS. IMM. GRANS. 0.0 K/UL    DF MANUAL      PLATELET COMMENTS Large Platelets      RBC COMMENTS NORMOCYTIC, NORMOCHROMIC     METABOLIC PANEL, BASIC    Collection Time: 09/18/19  6:12 AM   Result Value Ref Range    Sodium 141 136 - 145 mmol/L    Potassium 4.0 3.5 - 5.1 mmol/L    Chloride 109 (H) 97 - 108 mmol/L    CO2 27 21 - 32 mmol/L    Anion gap 5 5 - 15 mmol/L    Glucose 83 65 - 100 mg/dL    BUN 6 6 - 20 MG/DL    Creatinine 0.47 (L) 0.55 - 1.02 MG/DL    BUN/Creatinine ratio 13 12 - 20      GFR est AA >60 >60 ml/min/1.73m2    GFR est non-AA >60 >60 ml/min/1.73m2    Calcium 7.9 (L) 8.5 - 10.1 MG/DL         Assessment:     Principal Problem:    Abscess of abdominal cavity (HCC) (9/8/2019)    Active Problems:    Crohn disease ([facility] (12/19/2016)      2 Days Post-Op   Plan:     - adv diet - continue to encourage PO intake  - OOB AT - encourage OOB  - DVT prophylaxis -> lovenox  - voiding without trouble  - can increase oxycodone to 10mg. Wean from IV dilaudid if possible  - stop entereg  - Hgb 7.1 this am.  Will recheck in the am.  Transfuse as needed.     Anna Johnson PA-C

## 2019-09-18 NOTE — PROGRESS NOTES
[address]  [address] [facility], [address]       GI PROGRESS NOTE  Will Ashia Conteh  [phone] office  [phone] NP/PA in-hospital cell phone M-F until 4:30PM  After 5PM or on weekends, please call  for physician on call      NAME: Anamaria Small   :  1995   MRN:  [id number]       Subjective:   Patient complains of persistent abdominal pain and nausea. Passing flatus.  + Bowel movement. She reports having a few sips of liquids today. Objective:     VITALS:   Last 24hrs VS reviewed since prior progress note. Most recent are:  Visit Vitals  /71 (BP 1 Location: Right arm, BP Patient Position: At rest)   Pulse 80   Temp 98.2 °F (36.8 °C)   Resp 16   Ht 5' 5\" (1.651 m)   Wt 68.7 kg (151 lb 6.4 oz)   SpO2 99%   Breastfeeding? No   BMI 25.19 kg/m²       PHYSICAL EXAM:  General: Cooperative, no acute distress     Neurologic:  Alert and oriented  HEENT: EOMI, no scleral icterus   Lungs:  CTA bilaterally anteriorly  Heart:  S1 S2  Abdomen: Soft, non-distended, generalized tenderness, no guarding, no rebound. +Bowel sounds. Incisions clean, dry, and intact. Extremities: No edema  Psych:   Not anxious or agitated    Lab Data Reviewed:     Recent Results (from the past 24 hour(s))   CBC WITH AUTOMATED DIFF    Collection Time: 19  6:12 AM   Result Value Ref Range    WBC 5.2 3.6 - 11.0 K/uL    RBC 2.82 (L) 3.80 - 5.20 M/uL    HGB 7.1 (L) 11.5 - 16.0 g/dL    HCT 24.0 (L) 35.0 - 47.0 %    MCV 85.1 80.0 - 99.0 FL    MCH 25.2 (L) 26.0 - 34.0 PG    MCHC 29.6 (L) 30.0 - 36.5 g/dL    RDW 12.9 11.5 - 14.5 %    PLATELET 359 918 - 002 K/uL    MPV 9.5 8.9 - 12.9 FL    NRBC 0.0 0  WBC    ABSOLUTE NRBC 0.00 0.00 - 0.01 K/uL    NEUTROPHILS 90 (H) 32 - 75 %    LYMPHOCYTES 3 (L) 12 - 49 %    MONOCYTES 5 5 - 13 %    EOSINOPHILS 1 0 - 7 %    BASOPHILS 0 0 - 1 %    MYELOCYTES 1 (H) 0 %    IMMATURE GRANULOCYTES 0 %    ABS. NEUTROPHILS 4.7 1.8 - 8.0 K/UL    ABS.  LYMPHOCYTES 0.2 (L) 0.8 - 3.5 K/UL    ABS. MONOCYTES 0.3 0.0 - 1.0 K/UL    ABS. EOSINOPHILS 0.1 0.0 - 0.4 K/UL    ABS. BASOPHILS 0.0 0.0 - 0.1 K/UL    ABS. IMM. GRANS. 0.0 K/UL    DF MANUAL      PLATELET COMMENTS Large Platelets      RBC COMMENTS NORMOCYTIC, NORMOCHROMIC     METABOLIC PANEL, BASIC    Collection Time: 09/18/19  6:12 AM   Result Value Ref Range    Sodium 141 136 - 145 mmol/L    Potassium 4.0 3.5 - 5.1 mmol/L    Chloride 109 (H) 97 - 108 mmol/L    CO2 27 21 - 32 mmol/L    Anion gap 5 5 - 15 mmol/L    Glucose 83 65 - 100 mg/dL    BUN 6 6 - 20 MG/DL    Creatinine 0.47 (L) 0.55 - 1.02 MG/DL    BUN/Creatinine ratio 13 12 - 20      GFR est AA >60 >60 ml/min/1.73m2    GFR est non-AA >60 >60 ml/min/1.73m2    Calcium 7.9 (L) 8.5 - 10.1 MG/DL        Assessment:   · Crohn's disease: CT showed abscess and fistula. WBC 5.2, Hgb 7.1. Stool panel (9/9): positive for Plesiomonas shigelloides. C. difficile negative. Status post ileocolic resection (6/03/61). · Plesiomonas shigelloides     Patient Active Problem List   Diagnosis Code    Crohn disease ([facility] K50.90    Plantar wart of right foot B07.0    Psoriasis L40.9    Anxiety F41.9    Abscess of abdominal cavity ([facility] K65.1     Plan:   · Antibiotics per ID  · Postoperative management per surgery     Signed By: Lili Araya, [id number]     9/18/2019  12:56 PM       GI Attending: Agree with above. Appreciate ID and CRS teams' care. We will continue to follow along with you peripherally. Brennon Dorsey MD

## 2019-09-18 NOTE — PROGRESS NOTES
Bedside shift change report given to Santos Bone (oncoming nurse) by Lolly Prescott (offgoing nurse). Report included the following information SBAR.

## 2019-09-18 NOTE — PROGRESS NOTES
Infectious Diseases Progress Note    Antibiotic Summary:  Zosyn   -- present    Robotic ileocolic resection and drainage of abscess on 2019    Subjective:     She has anticipated pain postop. She has tolerated liquids    Objective:     Vitals:   Visit Vitals  /55 (BP 1 Location: Left arm, BP Patient Position: At rest)   Pulse 82   Temp 97.5 °F (36.4 °C)   Resp 16   Ht 5' 5\" (1.651 m)   Wt 66.3 kg (146 lb 1.6 oz)   LMP 2019   SpO2 97%   Breastfeeding? No   BMI 24.31 kg/m²        Tmax:  Temp (24hrs), Av.1 °F (36.7 °C), Min:97.5 °F (36.4 °C), Max:98.7 °F (37.1 °C)      Exam:  General appearance: alert, no distress  Lungs: clear to auscultation bilaterally  Heart: regular rate and rhythm  Abdomen: postop tenderness  Skin: no rash    IV Lines: peripheral    Labs:    Recent Labs     19  0215 19  0314   WBC 8.4 5.3   HGB 9.5* 11.1*    354   BUN 8 7   CREA 0.72 0.62     Intraop culture of RLQ abscess on 2019:   Gram stain = rare WBC; NOS   Culture = NGSF    Assessment:     1. Active Crohns with evolving fistulae and abdominopelvic abscess -- S/P robotic ileocolic resection and drainage of abscess on : stable postop    Plan:     1.  Ez Magaña MD

## 2019-09-19 LAB
ANION GAP SERPL CALC-SCNC: 5 MMOL/L (ref 5–15)
BASOPHILS # BLD: 0 K/UL (ref 0–0.1)
BASOPHILS NFR BLD: 0 % (ref 0–1)
BUN SERPL-MCNC: 9 MG/DL (ref 6–20)
BUN/CREAT SERPL: 16 (ref 12–20)
CALCIUM SERPL-MCNC: 8.3 MG/DL (ref 8.5–10.1)
CHLORIDE SERPL-SCNC: 107 MMOL/L (ref 97–108)
CO2 SERPL-SCNC: 30 MMOL/L (ref 21–32)
CREAT SERPL-MCNC: 0.55 MG/DL (ref 0.55–1.02)
DIFFERENTIAL METHOD BLD: ABNORMAL
EOSINOPHIL # BLD: 0.1 K/UL (ref 0–0.4)
EOSINOPHIL NFR BLD: 3 % (ref 0–7)
ERYTHROCYTE [DISTWIDTH] IN BLOOD BY AUTOMATED COUNT: 12.8 % (ref 11.5–14.5)
GLUCOSE SERPL-MCNC: 79 MG/DL (ref 65–100)
HCT VFR BLD AUTO: 21 % (ref 35–47)
HCT VFR BLD AUTO: 23.4 % (ref 35–47)
HGB BLD-MCNC: 6.3 G/DL (ref 11.5–16)
HGB BLD-MCNC: 7.2 G/DL (ref 11.5–16)
IMM GRANULOCYTES # BLD AUTO: 0 K/UL
IMM GRANULOCYTES NFR BLD AUTO: 0 %
LYMPHOCYTES # BLD: 0.5 K/UL (ref 0.8–3.5)
LYMPHOCYTES NFR BLD: 16 % (ref 12–49)
MCH RBC QN AUTO: 25.9 PG (ref 26–34)
MCHC RBC AUTO-ENTMCNC: 30 G/DL (ref 30–36.5)
MCV RBC AUTO: 86.4 FL (ref 80–99)
METAMYELOCYTES NFR BLD MANUAL: 1 %
MONOCYTES # BLD: 0.1 K/UL (ref 0–1)
MONOCYTES NFR BLD: 3 % (ref 5–13)
NEUTS BAND NFR BLD MANUAL: 1 % (ref 0–6)
NEUTS SEG # BLD: 2.4 K/UL (ref 1.8–8)
NEUTS SEG NFR BLD: 76 % (ref 32–75)
NRBC # BLD: 0 K/UL (ref 0–0.01)
NRBC BLD-RTO: 0 PER 100 WBC
O+P SPEC MICRO: NORMAL
O+P STL CONC: NORMAL
PLATELET # BLD AUTO: 227 K/UL (ref 150–400)
PMV BLD AUTO: 9.8 FL (ref 8.9–12.9)
POTASSIUM SERPL-SCNC: 4 MMOL/L (ref 3.5–5.1)
RBC # BLD AUTO: 2.43 M/UL (ref 3.8–5.2)
RBC MORPH BLD: ABNORMAL
SODIUM SERPL-SCNC: 142 MMOL/L (ref 136–145)
SPECIMEN SOURCE: NORMAL
WBC # BLD AUTO: 3.1 K/UL (ref 3.6–11)

## 2019-09-19 PROCEDURE — C9113 INJ PANTOPRAZOLE SODIUM, VIA: HCPCS | Performed by: INTERNAL MEDICINE

## 2019-09-19 PROCEDURE — 85025 COMPLETE CBC W/AUTO DIFF WBC: CPT

## 2019-09-19 PROCEDURE — 85018 HEMOGLOBIN: CPT

## 2019-09-19 PROCEDURE — 36430 TRANSFUSION BLD/BLD COMPNT: CPT

## 2019-09-19 PROCEDURE — 74011250636 HC RX REV CODE- 250/636: Performed by: COLON & RECTAL SURGERY

## 2019-09-19 PROCEDURE — P9016 RBC LEUKOCYTES REDUCED: HCPCS

## 2019-09-19 PROCEDURE — 74011000258 HC RX REV CODE- 258: Performed by: INTERNAL MEDICINE

## 2019-09-19 PROCEDURE — 74011250637 HC RX REV CODE- 250/637: Performed by: PHYSICIAN ASSISTANT

## 2019-09-19 PROCEDURE — 74011250637 HC RX REV CODE- 250/637: Performed by: COLON & RECTAL SURGERY

## 2019-09-19 PROCEDURE — 80048 BASIC METABOLIC PNL TOTAL CA: CPT

## 2019-09-19 PROCEDURE — 74011250637 HC RX REV CODE- 250/637: Performed by: INTERNAL MEDICINE

## 2019-09-19 PROCEDURE — 65270000029 HC RM PRIVATE

## 2019-09-19 PROCEDURE — 30233N1 TRANSFUSION OF NONAUTOLOGOUS RED BLOOD CELLS INTO PERIPHERAL VEIN, PERCUTANEOUS APPROACH: ICD-10-PCS | Performed by: INTERNAL MEDICINE

## 2019-09-19 PROCEDURE — 74011000250 HC RX REV CODE- 250: Performed by: INTERNAL MEDICINE

## 2019-09-19 PROCEDURE — 74011250636 HC RX REV CODE- 250/636: Performed by: INTERNAL MEDICINE

## 2019-09-19 PROCEDURE — 36415 COLL VENOUS BLD VENIPUNCTURE: CPT

## 2019-09-19 RX ORDER — LANOLIN ALCOHOL/MO/W.PET/CERES
1 CREAM (GRAM) TOPICAL 2 TIMES DAILY WITH MEALS
Status: DISCONTINUED | OUTPATIENT
Start: 2019-09-19 | End: 2019-09-21 | Stop reason: HOSPADM

## 2019-09-19 RX ORDER — SODIUM CHLORIDE 9 MG/ML
250 INJECTION, SOLUTION INTRAVENOUS AS NEEDED
Status: DISCONTINUED | OUTPATIENT
Start: 2019-09-19 | End: 2019-09-21 | Stop reason: HOSPADM

## 2019-09-19 RX ADMIN — CELECOXIB 100 MG: 100 CAPSULE ORAL at 09:01

## 2019-09-19 RX ADMIN — OXYCODONE HYDROCHLORIDE 10 MG: 5 TABLET ORAL at 15:09

## 2019-09-19 RX ADMIN — ACETAMINOPHEN 1000 MG: 500 TABLET ORAL at 21:33

## 2019-09-19 RX ADMIN — PIPERACILLIN SODIUM,TAZOBACTAM SODIUM 3.38 G: 3; .375 INJECTION, POWDER, FOR SOLUTION INTRAVENOUS at 01:11

## 2019-09-19 RX ADMIN — OXYCODONE HYDROCHLORIDE 5 MG: 5 TABLET ORAL at 06:35

## 2019-09-19 RX ADMIN — Medication 10 ML: at 01:14

## 2019-09-19 RX ADMIN — PIPERACILLIN SODIUM,TAZOBACTAM SODIUM 3.38 G: 3; .375 INJECTION, POWDER, FOR SOLUTION INTRAVENOUS at 08:57

## 2019-09-19 RX ADMIN — ACETAMINOPHEN 1000 MG: 500 TABLET ORAL at 15:09

## 2019-09-19 RX ADMIN — ESCITALOPRAM OXALATE 20 MG: 10 TABLET ORAL at 09:01

## 2019-09-19 RX ADMIN — OXYCODONE HYDROCHLORIDE 5 MG: 5 TABLET ORAL at 06:26

## 2019-09-19 RX ADMIN — Medication 10 ML: at 15:13

## 2019-09-19 RX ADMIN — SODIUM CHLORIDE 40 MG: 9 INJECTION INTRAMUSCULAR; INTRAVENOUS; SUBCUTANEOUS at 08:51

## 2019-09-19 RX ADMIN — FERROUS SULFATE TAB 325 MG (65 MG ELEMENTAL FE) 325 MG: 325 (65 FE) TAB at 16:11

## 2019-09-19 RX ADMIN — HYDROMORPHONE HYDROCHLORIDE 1 MG: 1 INJECTION, SOLUTION INTRAMUSCULAR; INTRAVENOUS; SUBCUTANEOUS at 08:46

## 2019-09-19 RX ADMIN — MESALAMINE 1600 MG: 800 TABLET, DELAYED RELEASE ORAL at 09:01

## 2019-09-19 RX ADMIN — Medication 10 ML: at 06:24

## 2019-09-19 RX ADMIN — OXYCODONE HYDROCHLORIDE 10 MG: 5 TABLET ORAL at 19:56

## 2019-09-19 RX ADMIN — OXYCODONE HYDROCHLORIDE 10 MG: 5 TABLET ORAL at 10:50

## 2019-09-19 RX ADMIN — HYDROMORPHONE HYDROCHLORIDE 1 MG: 1 INJECTION, SOLUTION INTRAMUSCULAR; INTRAVENOUS; SUBCUTANEOUS at 01:18

## 2019-09-19 RX ADMIN — MESALAMINE 1600 MG: 800 TABLET, DELAYED RELEASE ORAL at 15:13

## 2019-09-19 RX ADMIN — ACETAMINOPHEN 1000 MG: 500 TABLET ORAL at 06:23

## 2019-09-19 RX ADMIN — MESALAMINE 1600 MG: 800 TABLET, DELAYED RELEASE ORAL at 21:33

## 2019-09-19 RX ADMIN — PIPERACILLIN SODIUM,TAZOBACTAM SODIUM 3.38 G: 3; .375 INJECTION, POWDER, FOR SOLUTION INTRAVENOUS at 17:50

## 2019-09-19 RX ADMIN — FERROUS SULFATE TAB 325 MG (65 MG ELEMENTAL FE) 325 MG: 325 (65 FE) TAB at 07:25

## 2019-09-19 NOTE — PROGRESS NOTES
Problem: Pain  Goal: *Control of Pain  Outcome: Progressing Towards Goal  Pt. Able to verbalize pain at this time. Pt. Understands how to report pain and when to call the nurse for pain medication interventions. Problem: Nutrition Deficit  Goal: *Optimize nutritional status  Outcome: Progressing Towards Goal  Pt. Verbalizes understanding of proper nutrition. Pt working towards increase intake during mealtimes.

## 2019-09-19 NOTE — PROGRESS NOTES
Infectious Diseases Progress Note    Antibiotic Summary:  Zosyn   -- present    Robotic ileocolic resection and drainage of abscess on 2019    Subjective: Moderate pain; nausea without vomiting; no BM    Objective:     Vitals:   Visit Vitals  /75   Pulse 93   Temp 98.2 °F (36.8 °C)   Resp 17   Ht 5' 5\" (1.651 m)   Wt 68.7 kg (151 lb 6.4 oz)   LMP 2019   SpO2 99%   Breastfeeding? No   BMI 25.19 kg/m²        Tmax:  Temp (24hrs), Av.1 °F (36.7 °C), Min:97.6 °F (36.4 °C), Max:98.4 °F (36.9 °C)      Exam:  General appearance: alert, no distress  Lungs: clear to auscultation bilaterally  Heart: regular rate and rhythm  Abdomen: tender; no BS  Skin: no rash    IV Lines: peripheral    Labs:    Recent Labs     19  0612 19  0215 19  0314   WBC 5.2 8.4 5.3   HGB 7.1* 9.5* 11.1*    338 354   BUN 6 8 7   CREA 0.47* 0.72 0.62     Intraop culture of RLQ abscess on 2019:   Gram stain = rare WBC; NOS   Culture = NGSF    Assessment:     1. Active Crohns with evolving fistulae and abdominopelvic abscess -- S/P robotic ileocolic resection and drainage of abscess on : stable postop    Plan:     1.  Ashlee Jorge MD

## 2019-09-19 NOTE — PROGRESS NOTES
Attempted Initial Spiritual Assessment in 501/01. Unable to assess patients needs. Pt appears to be asleep. Consulted with nurse Anna Gonzalez . Chaplains will follow as able and/or as needed.     Ge Armas,  Intern, MDiv  [phone] ([phone])

## 2019-09-19 NOTE — PROGRESS NOTES
General Daily Progress Note    Admit Date: 9/8/2019  3 Days Post-Op   Subjective:     Patient stable this morning. Fewer bowel movements overnight. Patient was able to eat some dinner last night. Tolerated without nausea. Passing gas. Pain somewhat improved with 10mg oxycodone. Urine output 1500 overnight    Objective:     Patient Vitals for the past 24 hrs:   BP Temp Pulse Resp SpO2 Weight   09/19/19 0731 97/60 97.9 °F (36.6 °C) 78 16 97 %    09/19/19 0637      68.4 kg (150 lb 11.2 oz)   09/18/19 2320 99/63 97.9 °F (36.6 °C) 90 16 97 %    09/18/19 2057 135/75 98.2 °F (36.8 °C) 93 17 99 %    09/18/19 1509 118/68 98.4 °F (36.9 °C) 68 18 98 %      Patient Vitals for the past 8 hrs:   BP Temp Pulse Resp SpO2 Weight   09/19/19 0731 97/60 97.9 °F (36.6 °C) 78 16 97 %    09/19/19 0637      68.4 kg (150 lb 11.2 oz)     09/19 0701 - 09/19 1900  In: 500 [P.O.:500]  Out: -   09/17 1901 - 09/19 0700  In: 928.5 [P.O.:180; I.V.:748.5]  Out: 2473 [Urine:1575]    Physical Exam:   Alert and oriented. No distress  Abdomen soft. nontender  Incisions clean and dry          Data Review   Recent Results (from the past 24 hour(s))   CBC WITH AUTOMATED DIFF    Collection Time: 09/19/19  6:25 AM   Result Value Ref Range    WBC 3.1 (L) 3.6 - 11.0 K/uL    RBC 2.43 (L) 3.80 - 5.20 M/uL    HGB 6.3 (L) 11.5 - 16.0 g/dL    HCT 21.0 (L) 35.0 - 47.0 %    MCV 86.4 80.0 - 99.0 FL    MCH 25.9 (L) 26.0 - 34.0 PG    MCHC 30.0 30.0 - 36.5 g/dL    RDW 12.8 11.5 - 14.5 %    PLATELET 898 818 - 523 K/uL    MPV 9.8 8.9 - 12.9 FL    NRBC 0.0 0  WBC    ABSOLUTE NRBC 0.00 0.00 - 0.01 K/uL    NEUTROPHILS PENDING %    LYMPHOCYTES PENDING %    MONOCYTES PENDING %    EOSINOPHILS PENDING %    BASOPHILS PENDING %    IMMATURE GRANULOCYTES PENDING %    ABS. NEUTROPHILS PENDING K/UL    ABS. LYMPHOCYTES PENDING K/UL    ABS. MONOCYTES PENDING K/UL    ABS. EOSINOPHILS PENDING K/UL    ABS. BASOPHILS PENDING K/UL    ABS. IMM. GRANS.  PENDING K/UL    DF PENDING    METABOLIC PANEL, BASIC    Collection Time: 09/19/19  6:25 AM   Result Value Ref Range    Sodium 142 136 - 145 mmol/L    Potassium 4.0 3.5 - 5.1 mmol/L    Chloride 107 97 - 108 mmol/L    CO2 30 21 - 32 mmol/L    Anion gap 5 5 - 15 mmol/L    Glucose 79 65 - 100 mg/dL    BUN 9 6 - 20 MG/DL    Creatinine 0.55 0.55 - 1.02 MG/DL    BUN/Creatinine ratio 16 12 - 20      GFR est AA >60 >60 ml/min/1.73m2    GFR est non-AA >60 >60 ml/min/1.73m2    Calcium 8.3 (L) 8.5 - 10.1 MG/DL         Assessment:     Principal Problem:    Abscess of abdominal cavity (HCC) (9/8/2019)    Active Problems:    Crohn disease ([facility] [address] (12/19/2016)      3 Days Post-Op   Plan:     - adv diet - continue to encourage PO intake as tolerated. Tolerating fluids  - urine output - 1500cc overnight with just po intake  - OOB AT - encourage ambulation today  - DVT prophylaxis -> lovenox  - Hgb 6.3 this morning. Acute blood loss anemia. Increase iron supplements. Patient vital signs stable asymptomatic. Hold off on transfusion for now. - pain management - continue PO oxycodone. Wean IV dilaudid  - discharge - possibly tomorrow if continues to progress.     Yolanda Rust PA-C

## 2019-09-19 NOTE — PROGRESS NOTES
Ambulation was encouraged. Patient stated feeling to weak at present. Receiving blood transfusion . \"Will try ambulation later\". Diane Rivera

## 2019-09-19 NOTE — ROUTINE PROCESS
Bedside shift change report given to Holly Crandall RN (oncoming nurse) by Marie Castle RN (offgoing nurse). Report included the following information SBAR, Kardex, Intake/Output, MAR and Recent Results.

## 2019-09-19 NOTE — PROGRESS NOTES
Bedside shift change report given to ANILA Florez (oncoming nurse) by John Barros RN (offgoing nurse). Report included the following information SBAR, Kardex, Procedure Summary, Intake/Output, MAR and Recent Results.

## 2019-09-19 NOTE — PROGRESS NOTES
Hospitalist Progress Note  Lashell Hua MD  Answering service: [phone] OR [phone] from in house phone        Date of Service:  2019  NAME:  Savannah Ryan  :  1995  MRN:  [id number]  PCP: Gregg Nuenz MD    Chief Complaint:   Chief Complaint   Patient presents with    Inflammatory Bowel Disease    Abdominal Pain    Fever    Melena         Admission Summary:     Savannah Ryan is a 26 y.o. female who states that she was diagnosed with Crohns disease at the age of 17. Comes in with abdominal pain due to intra-abdominal abscess    Interval history / Subjective:   Pt seen for FU of CC: CD and ileocecal abscess  Feels better relatively compared to yesterday  Afebrile  No NV  Soft stools  Oral intake is improving  Anemic, feels dizzy while getting up  Use OTC CBD oil for pain control as well. Assessment & Plan:     Sepsis with intra-abdominal abscess - improving   Secondary to Crohn's disease    - CT abd: reviewed  - IR consulted for abscess drainage but it was not done due ot possibility of adhesion  - s/p ileocolic resection 3/74, biopsy shows abscess  - stool cx positive for P. Shigelloides   - ID on board, to recs final antibiotics  - currently c/w IV zosyn. 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  DW CRS  Transfuse PRN  No obvious bleeding  cont PPI to prevent stress ulcers  Iron supplementation    Depression- lexapro     Code status: Full Code  DVT prophylaxis: Lovenox  Care Plan discussed with: Patient/Family  Disposition: Home w/Family and TBD. Hospital Problems  Date Reviewed: 2019          Codes Class Noted POA    * (Principal) Abscess of abdominal cavity ([facility] ICD-10-CM: K65.1  ICD-9-CM: 567.22  2019 Yes        Crohn disease ([facility] ICD-10-CM: K50.90  ICD-9-CM: 555.9  2016 Yes                Review of Systems:   A comprehensive review of systems was negative except for that written in the HPI. Physical Examination:     Visit Vitals  BP 97/60 (BP 1 Location: Right arm, BP Patient Position: At rest)   Pulse 78   Temp 97.9 °F (36.6 °C)   Resp 16   Ht 5' 5\" (1.651 m)   Wt 68.4 kg (150 lb 11.2 oz)   SpO2 97%   Breastfeeding? No   BMI 25.08 kg/m²     General appearance: NAD, pale, resting, pleasant  Head: Normocephalic, without obvious abnormality, atraumatic  Eyes: conjunctivae/corneas clear. Lungs: CTA BL, No R/R/W  Heart: RRR,S1S2  Abdomen: soft, surgical sites are clean, mild TTP around the surgical site, BS+  Neurologic: Grossly normal    Vital Signs:    Last 24hrs VS reviewed since prior progress note. Most recent are:    Visit Vitals  BP 97/60 (BP 1 Location: Right arm, BP Patient Position: At rest)   Pulse 78   Temp 97.9 °F (36.6 °C)   Resp 16   Ht 5' 5\" (1.651 m)   Wt 68.4 kg (150 lb 11.2 oz)   SpO2 97%   Breastfeeding? No   BMI 25.08 kg/m²         Intake/Output Summary (Last 24 hours) at 2019 1031  Last data filed at 2019 0725  Gross per 24 hour   Intake 500 ml   Output 1575 ml   Net -1075 ml        Tmax:  Temp (24hrs), Av.1 °F (36.7 °C), Min:97.9 °F (36.6 °C), Max:98.4 °F (36.9 °C)      Data Review:   Data reviewed by myself:  Ct Abd Pelv W Cont    Result Date: 2019  EXAM:  CT abdomen pelvis with contrast INDICATION: Abdominal pain and fever. Inflammatory bowel disease. COMPARISON: None.  TECHNIQUE: Helical CT of the abdomen  and pelvis  following the uneventful intravenous administration of nonionic contrast.  Coronal and sagittal reformats are performed. CT dose reduction was achieved through use of a standardized protocol tailored for this examination and automatic exposure control for dose modulation. Adaptive statistical iterative reconstruction (ASIR) was utilized. FINDINGS: The visualized lung bases demonstrate no mass or consolidation. The heart size is normal. There is no pericardial or pleural effusion. The liver, spleen, pancreas, and adrenal glands are normal. The gall bladder is present  without intra- or extra-hepatic biliary dilatation. The kidneys are symmetric without hydronephrosis. There is marked inflammation involving small bowel loops in the right lower abdomen where there is distal ileum thickening and adjacent inflammation as well as mucosal hyperenhancement. 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 Enhancing but nonenlarged right lower mesenteric lymph nodes are likely reactive. There are no dilated bowel loops. The appendix is unremarkable. There are no enlarged lymph nodes. There is no free air. The urinary bladder is normal.  There is no pelvic mass. An IUD is present. The bony structures are age-appropriate. IMPRESSION: Marked inflammatory changes involving the distal small bowel loops in the right lower quadrant in keeping with history of Crohn disease. 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 No bowel obstruction. Normal appendix. Xr Chest Port    Result Date: 9/8/2019  EXAM:  CR chest portable INDICATION: Fever. Abdominal pain. COMPARISON: None. TECHNIQUE: Portable AP upright chest view at 1956 hours FINDINGS: Cardiac monitoring wires overlie the thorax. The cardiomediastinal contours are within normal limits.  The lungs and pleural spaces are clear. There is no pneumothorax. The bones and upper abdomen are unremarkable. IMPRESSION: No acute process. No results found for: SDES  Lab Results   Component Value Date/Time    Culture result: NO GROWTH 3 DAYS 09/16/2019 06:50 PM    Culture result: NO GROWTH 6 DAYS 09/08/2019 06:25 PM    Culture result: NO GROWTH 6 DAYS 09/08/2019 06:25 PM     All Micro Results     Procedure Component Value Units Date/Time    CULTURE, Isabel Mins STAIN [[id number]] Collected:  09/16/19 1850    Order Status:  Completed Specimen:  Misc.  Wound Updated:  09/19/19 1028     Special Requests: ILEOCLIC ABSCESS, LOOK FPR ANAEROBES     GRAM STAIN RARE WBCS SEEN         NO ORGANISMS SEEN        Culture result: NO GROWTH 3 DAYS       CULTURE, BLOOD [[id number]] Collected:  09/08/19 1825    Order Status:  Completed Specimen:  Blood Updated:  09/14/19 0610     Special Requests: NO SPECIAL REQUESTS        Culture result: NO GROWTH 6 DAYS       CULTURE, BLOOD [[id number]] Collected:  09/08/19 1825    Order Status:  Completed Specimen:  Blood Updated:  09/14/19 0610     Special Requests: NO SPECIAL REQUESTS        Culture result: NO GROWTH 6 DAYS       CULTURE, STOOL [[id number]]  (Abnormal) Collected:  09/09/19 1114    Order Status:  Completed Specimen:  Stool Updated:  09/11/19 1553     Shigella species, DNA NEGATIVE         Campylobacter species, DNA NEGATIVE         Vibrio species, DNA NEGATIVE         Enterotoxigen E Coli, DNA NEGATIVE         Shiga toxin producing, DNA NEGATIVE         Salmonella species, DNA NEGATIVE         P. shigelloides, DNA POSITIVE        Y. enterocolitica, DNA NEGATIVE        C. DIFFICILE AG & TOXIN A/B [[id number]] Collected:  09/10/19 1114    Order Status:  Completed Specimen:  Stool Updated:  09/11/19 1204     GDH ANTIGEN NEGATIVE         C. difficile toxin NEGATIVE         INTERPRETATION       NEGATIVE FOR TOXIGENIC C. DIFFICILE          OVA & PARASITES, STOOL [[id number]] Collected:  09/10/19 1114    Order Status:  Completed Specimen:  Stool Updated:  09/10/19 1145    URINE CULTURE HOLD SAMPLE [[id number]] Collected:  09/08/19 1838    Order Status:  Completed Specimen:  Urine from Serum Updated:  09/08/19 1846     Urine culture hold       URINE ON HOLD IN MICROBIOLOGY DEPT FOR 3 DAYS. IF UNPRESERVED URINE IS SUBMITTED, IT CANNOT BE USED FOR ADDITIONAL TESTING AFTER 24 HRS, RECOLLECTION WILL BE REQUIRED. Labs: reviewed by myself. Recent Labs     09/19/19 0625 09/18/19 0612   WBC 3.1* 5.2   HGB 6.3* 7.1*   HCT 21.0* 24.0*    246     Recent Labs     09/19/19 0625 09/18/19 0612 09/17/19  0215    141 139   K 4.0 4.0 3.8    109* 104   CO2 30 27 27   BUN 9 6 8   CREA 0.55 0.47* 0.72   GLU 79 83 157*   CA 8.3* 7.9* 8.7   MG  --   --  2.4   PHOS  --   --  3.2     No results for input(s): SGOT, GPT, ALT, AP, TBIL, TBILI, TP, ALB, GLOB, GGT, AML, LPSE in the last 72 hours. No lab exists for component: AMYP, HLPSE  No results for input(s): INR, PTP, APTT in the last 72 hours. No lab exists for component: INREXT, INREXT   No results for input(s): FE, TIBC, PSAT, FERR in the last 72 hours. No results found for: FOL, RBCF   No results for input(s): PH, PCO2, PO2 in the last 72 hours. No results for input(s): CPK, CKNDX, TROIQ in the last 72 hours.     No lab exists for component: CPKMB  No results found for: CHOL, CHOLX, CHLST, CHOLV, HDL, HDLP, LDL, LDLC, DLDLP, TGLX, TRIGL, TRIGP, CHHD, CHHDX  No results found for: [facility]  Lab Results   Component Value Date/Time    Color YELLOW/STRAW 09/08/2019 06:38 PM    Appearance CLEAR 09/08/2019 06:38 PM    Specific gravity 1.008 09/08/2019 06:38 PM    pH (UA) 7.5 09/08/2019 06:38 PM    Protein NEGATIVE  09/08/2019 06:38 PM    Glucose NEGATIVE  09/08/2019 06:38 PM    Ketone NEGATIVE  09/08/2019 06:38 PM    Bilirubin NEGATIVE  09/08/2019 06:38 PM    Urobilinogen 0.2 09/08/2019 06:38 PM    Nitrites NEGATIVE  09/08/2019 06:38 PM    Leukocyte Esterase NEGATIVE  09/08/2019 06:38 PM    Epithelial cells FEW 09/08/2019 06:38 PM    Bacteria NEGATIVE  09/08/2019 06:38 PM    WBC 0-4 09/08/2019 06:38 PM    RBC 0-5 09/08/2019 06:38 PM         Medications Reviewed:     Current Facility-Administered Medications   Medication Dose Route Frequency    ferrous sulfate tablet 325 mg  1 Tab Oral BID WITH MEALS    0.9% sodium chloride infusion 250 mL  250 mL IntraVENous PRN    pantoprazole (PROTONIX) 40 mg in sodium chloride 0.9% 10 mL injection  40 mg IntraVENous DAILY    oxyCODONE IR (ROXICODONE) tablet 10 mg  10 mg Oral Q4H PRN    influenza vaccine 2019-20 (6 mos+)(PF) (FLUARIX/FLULAVAL/FLUZONE QUAD) injection 0.5 mL  0.5 mL IntraMUSCular PRIOR TO DISCHARGE    HYDROmorphone (PF) (DILAUDID) injection 1 mg  1 mg IntraVENous Q3H PRN    acetaminophen (TYLENOL) tablet 1,000 mg  1,000 mg Oral Q6H    celecoxib (CELEBREX) capsule 100 mg  100 mg Oral BID    naloxone (NARCAN) injection 0.4 mg  0.4 mg IntraVENous PRN    oxyCODONE IR (ROXICODONE) tablet 5 mg  5 mg Oral Q4H PRN    ondansetron (ZOFRAN ODT) tablet 4 mg  4 mg Oral Q4H PRN    enoxaparin (LOVENOX) injection 40 mg  40 mg SubCUTAneous Q24H    acetaminophen (TYLENOL) tablet 650 mg  650 mg Oral Q6H PRN    0.9% sodium chloride infusion  75 mL/hr IntraVENous CONTINUOUS    piperacillin-tazobactam (ZOSYN) 3.375 g in 0.9% sodium chloride (MBP/ADV) 100 mL  3.375 g IntraVENous Q8H    sodium chloride (NS) flush 5-10 mL  5-10 mL IntraVENous PRN    sodium chloride (NS) flush 5-40 mL  5-40 mL IntraVENous Q8H    sodium chloride (NS) flush 5-40 mL  5-40 mL IntraVENous PRN    ondansetron (ZOFRAN) injection 4 mg  4 mg IntraVENous Q4H PRN    mesalamine DR (ASACOL HD) tablet 1,600 mg  1,600 mg Oral TID    escitalopram oxalate (LEXAPRO) tablet 20 mg  20 mg Oral DAILY     ______________________________________________________________________  EXPECTED LENGTH OF STAY: 4d 19h  ACTUAL LENGTH OF STAY:          11 Richa Mendez MD     Patient's emergency contacts:  Extended Emergency Contact Information  Primary Emergency Contact: [address]  Mobile Phone: [phone]  Relation: Mother

## 2019-09-20 LAB
ABO + RH BLD: NORMAL
ANION GAP SERPL CALC-SCNC: 5 MMOL/L (ref 5–15)
BACTERIA SPEC CULT: NORMAL
BASOPHILS # BLD: 0.1 K/UL (ref 0–0.1)
BASOPHILS NFR BLD: 1 % (ref 0–1)
BLD PROD TYP BPU: NORMAL
BLOOD GROUP ANTIBODIES SERPL: NORMAL
BPU ID: NORMAL
BUN SERPL-MCNC: 6 MG/DL (ref 6–20)
BUN/CREAT SERPL: 12 (ref 12–20)
CALCIUM SERPL-MCNC: 8.5 MG/DL (ref 8.5–10.1)
CHLORIDE SERPL-SCNC: 104 MMOL/L (ref 97–108)
CO2 SERPL-SCNC: 29 MMOL/L (ref 21–32)
CREAT SERPL-MCNC: 0.5 MG/DL (ref 0.55–1.02)
CROSSMATCH RESULT,%XM: NORMAL
DIFFERENTIAL METHOD BLD: ABNORMAL
EOSINOPHIL # BLD: 0.1 K/UL (ref 0–0.4)
EOSINOPHIL NFR BLD: 1 % (ref 0–7)
ERYTHROCYTE [DISTWIDTH] IN BLOOD BY AUTOMATED COUNT: 13 % (ref 11.5–14.5)
GLUCOSE SERPL-MCNC: 81 MG/DL (ref 65–100)
GRAM STN SPEC: NORMAL
GRAM STN SPEC: NORMAL
HCT VFR BLD AUTO: 23.8 % (ref 35–47)
HGB BLD-MCNC: 7.3 G/DL (ref 11.5–16)
IMM GRANULOCYTES # BLD AUTO: 0 K/UL
IMM GRANULOCYTES NFR BLD AUTO: 0 %
LYMPHOCYTES # BLD: 0.4 K/UL (ref 0.8–3.5)
LYMPHOCYTES NFR BLD: 9 % (ref 12–49)
MCH RBC QN AUTO: 26.1 PG (ref 26–34)
MCHC RBC AUTO-ENTMCNC: 30.7 G/DL (ref 30–36.5)
MCV RBC AUTO: 85 FL (ref 80–99)
MONOCYTES # BLD: 0.2 K/UL (ref 0–1)
MONOCYTES NFR BLD: 5 % (ref 5–13)
NEUTS BAND NFR BLD MANUAL: 3 % (ref 0–6)
NEUTS SEG # BLD: 4.1 K/UL (ref 1.8–8)
NEUTS SEG NFR BLD: 81 % (ref 32–75)
NRBC # BLD: 0 K/UL (ref 0–0.01)
NRBC BLD-RTO: 0 PER 100 WBC
PLATELET # BLD AUTO: 239 K/UL (ref 150–400)
PLATELET COMMENTS,PCOM: ABNORMAL
PMV BLD AUTO: 10.2 FL (ref 8.9–12.9)
POTASSIUM SERPL-SCNC: 4 MMOL/L (ref 3.5–5.1)
RBC # BLD AUTO: 2.8 M/UL (ref 3.8–5.2)
RBC MORPH BLD: ABNORMAL
SERVICE CMNT-IMP: NORMAL
SODIUM SERPL-SCNC: 138 MMOL/L (ref 136–145)
SPECIMEN EXP DATE BLD: NORMAL
STATUS OF UNIT,%ST: NORMAL
UNIT DIVISION, %UDIV: 0
WBC # BLD AUTO: 4.9 K/UL (ref 3.6–11)

## 2019-09-20 PROCEDURE — 74011250637 HC RX REV CODE- 250/637: Performed by: PHYSICIAN ASSISTANT

## 2019-09-20 PROCEDURE — 36415 COLL VENOUS BLD VENIPUNCTURE: CPT

## 2019-09-20 PROCEDURE — 74011250637 HC RX REV CODE- 250/637: Performed by: INTERNAL MEDICINE

## 2019-09-20 PROCEDURE — 74011000250 HC RX REV CODE- 250: Performed by: INTERNAL MEDICINE

## 2019-09-20 PROCEDURE — C9113 INJ PANTOPRAZOLE SODIUM, VIA: HCPCS | Performed by: INTERNAL MEDICINE

## 2019-09-20 PROCEDURE — 74011000258 HC RX REV CODE- 258: Performed by: INTERNAL MEDICINE

## 2019-09-20 PROCEDURE — 74011250636 HC RX REV CODE- 250/636: Performed by: INTERNAL MEDICINE

## 2019-09-20 PROCEDURE — 65270000029 HC RM PRIVATE

## 2019-09-20 PROCEDURE — 80048 BASIC METABOLIC PNL TOTAL CA: CPT

## 2019-09-20 PROCEDURE — 85025 COMPLETE CBC W/AUTO DIFF WBC: CPT

## 2019-09-20 PROCEDURE — 74011250637 HC RX REV CODE- 250/637: Performed by: COLON & RECTAL SURGERY

## 2019-09-20 PROCEDURE — 74011250636 HC RX REV CODE- 250/636: Performed by: COLON & RECTAL SURGERY

## 2019-09-20 RX ADMIN — OXYCODONE HYDROCHLORIDE 10 MG: 5 TABLET ORAL at 06:06

## 2019-09-20 RX ADMIN — OXYCODONE HYDROCHLORIDE 10 MG: 5 TABLET ORAL at 16:25

## 2019-09-20 RX ADMIN — ACETAMINOPHEN 1000 MG: 500 TABLET ORAL at 03:35

## 2019-09-20 RX ADMIN — PIPERACILLIN SODIUM,TAZOBACTAM SODIUM 3.38 G: 3; .375 INJECTION, POWDER, FOR SOLUTION INTRAVENOUS at 00:54

## 2019-09-20 RX ADMIN — OXYCODONE HYDROCHLORIDE 10 MG: 5 TABLET ORAL at 01:00

## 2019-09-20 RX ADMIN — OXYCODONE HYDROCHLORIDE 10 MG: 5 TABLET ORAL at 20:58

## 2019-09-20 RX ADMIN — ACETAMINOPHEN 1000 MG: 500 TABLET ORAL at 15:16

## 2019-09-20 RX ADMIN — OXYCODONE HYDROCHLORIDE 5 MG: 5 TABLET ORAL at 12:05

## 2019-09-20 RX ADMIN — OXYCODONE HYDROCHLORIDE 5 MG: 5 TABLET ORAL at 12:02

## 2019-09-20 RX ADMIN — FERROUS SULFATE TAB 325 MG (65 MG ELEMENTAL FE) 325 MG: 325 (65 FE) TAB at 07:16

## 2019-09-20 RX ADMIN — Medication 10 ML: at 06:07

## 2019-09-20 RX ADMIN — PIPERACILLIN SODIUM,TAZOBACTAM SODIUM 3.38 G: 3; .375 INJECTION, POWDER, FOR SOLUTION INTRAVENOUS at 09:34

## 2019-09-20 RX ADMIN — FERROUS SULFATE TAB 325 MG (65 MG ELEMENTAL FE) 325 MG: 325 (65 FE) TAB at 16:25

## 2019-09-20 RX ADMIN — Medication 10 ML: at 13:08

## 2019-09-20 RX ADMIN — HYDROMORPHONE HYDROCHLORIDE 1 MG: 1 INJECTION, SOLUTION INTRAMUSCULAR; INTRAVENOUS; SUBCUTANEOUS at 17:42

## 2019-09-20 RX ADMIN — MESALAMINE 1600 MG: 800 TABLET, DELAYED RELEASE ORAL at 15:16

## 2019-09-20 RX ADMIN — HYDROMORPHONE HYDROCHLORIDE 1 MG: 1 INJECTION, SOLUTION INTRAMUSCULAR; INTRAVENOUS; SUBCUTANEOUS at 22:25

## 2019-09-20 RX ADMIN — PIPERACILLIN SODIUM,TAZOBACTAM SODIUM 3.38 G: 3; .375 INJECTION, POWDER, FOR SOLUTION INTRAVENOUS at 16:30

## 2019-09-20 RX ADMIN — MESALAMINE 1600 MG: 800 TABLET, DELAYED RELEASE ORAL at 09:36

## 2019-09-20 RX ADMIN — HYDROMORPHONE HYDROCHLORIDE 1 MG: 1 INJECTION, SOLUTION INTRAMUSCULAR; INTRAVENOUS; SUBCUTANEOUS at 13:04

## 2019-09-20 RX ADMIN — SODIUM CHLORIDE 75 ML/HR: 900 INJECTION, SOLUTION INTRAVENOUS at 17:29

## 2019-09-20 RX ADMIN — ESCITALOPRAM OXALATE 20 MG: 10 TABLET ORAL at 09:34

## 2019-09-20 RX ADMIN — MESALAMINE 1600 MG: 800 TABLET, DELAYED RELEASE ORAL at 21:00

## 2019-09-20 RX ADMIN — SODIUM CHLORIDE 40 MG: 9 INJECTION INTRAMUSCULAR; INTRAVENOUS; SUBCUTANEOUS at 09:34

## 2019-09-20 RX ADMIN — HYDROMORPHONE HYDROCHLORIDE 1 MG: 1 INJECTION, SOLUTION INTRAMUSCULAR; INTRAVENOUS; SUBCUTANEOUS at 04:16

## 2019-09-20 RX ADMIN — ACETAMINOPHEN 1000 MG: 500 TABLET ORAL at 09:35

## 2019-09-20 RX ADMIN — Medication 10 ML: at 21:00

## 2019-09-20 NOTE — PROGRESS NOTES
Hospitalist Progress Note  Sage Espinoza MD  Answering service: [phone] OR [phone] from in house phone        Date of Service:  2019  NAME:  Daniela Gonzalez  :  1995  MRN:  [id number]  PCP: Elenita Montiel MD    Chief Complaint:   Chief Complaint   Patient presents with    Inflammatory Bowel Disease    Abdominal Pain    Fever    Melena         Admission Summary:     Daniela Gonzalez is a 26 y.o. female who states that she was diagnosed with Crohns disease at the age of 17. Comes in with abdominal pain due to intra-abdominal abscess    Interval history / Subjective:   Pt seen for FU of CC: CD and ileocecal abscess  Cont to have significant pain, very concerned about leaving home before her pain is controlled. Denies NVD  Eating well  Afebrile  No bleeding  NW NS     Assessment & Plan:     Sepsis with intra-abdominal abscess - improving   Secondary to Crohn's disease    - CT abd: reviewed  - IR consulted for abscess drainage but it was not done due ot possibility of adhesion  - s/p ileocolic resection , biopsy shows abscess  - stool cx positive for P. Shigelloides   - ID on board, to recs final antibiotics  - currently c/w IV zosyn. Awaiting ID recs for final discharge plan.   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  S/p Laparoscopic Robotic ileocolic resection and repair for serosal tear 19  - Currently on humira - outpt   - c/w asacol   - GI following  - post op care as per surgical team    Large right ovarian cyst  DW patient, she will need to FU with Ob/GYN, and will need a pelvic US likely as OP    Acute blood loss anemia sec to clay-operative blood loss  HH 6.3, pt reports dizziness with even sitting up, symptomatic anemia  s/p 1 unit PRBC , repeat HH stable, repeat CBC in am  Transfuse PRN  No obvious bleeding  cont PPI to prevent stress ulcers  Iron supplementation    Depression- lexapro     Code status: Full Code  DVT prophylaxis: Lovenox  Care Plan discussed with: Patient/Family  Disposition: Home w/Family and TBD. Hospital Problems  Date Reviewed: 2019          Codes Class Noted POA    * (Principal) Abscess of abdominal cavity ([facility] ICD-10-CM: K65.1  ICD-9-CM: 567.22  2019 Yes        Crohn disease ([facility] ICD-10-CM: K50.90  ICD-9-CM: 555.9  2016 Yes                Review of Systems:   A comprehensive review of systems was negative except for that written in the HPI. Physical Examination:     Visit Vitals  BP 96/52 (BP 1 Location: Left arm, BP Patient Position: Supine)   Pulse 76   Temp 98.1 °F (36.7 °C)   Resp 18   Ht 5' 5\" (1.651 m)   Wt 68.4 kg (150 lb 11.2 oz)   SpO2 99%   Breastfeeding? No   BMI 25.08 kg/m²     General appearance: NAD, pale, resting, pleasant  Head: Normocephalic, without obvious abnormality, atraumatic  Eyes: conjunctivae/corneas clear. Lungs: CTA BL, No R/R/W  Heart: RRR,S1S2  Abdomen: soft, surgical sites are clean, mild TTP around the surgical site, BS+  Neurologic: Grossly normal    Vital Signs:    Last 24hrs VS reviewed since prior progress note. Most recent are:    Visit Vitals  BP 96/52 (BP 1 Location: Left arm, BP Patient Position: Supine)   Pulse 76   Temp 98.1 °F (36.7 °C)   Resp 18   Ht 5' 5\" (1.651 m)   Wt 68.4 kg (150 lb 11.2 oz)   SpO2 99%   Breastfeeding? No   BMI 25.08 kg/m²         Intake/Output Summary (Last 24 hours) at 2019 1255  Last data filed at 2019 4443  Gross per 24 hour   Intake 310 ml   Output 2850 ml   Net -2540 ml        Tmax:  Temp (24hrs), Av.2 °F (36.8 °C), Min:97.8 °F (36.6 °C), Max:98.7 °F (37.1 °C)      Data Review:   Data reviewed by myself:  Ct Abd Pelv W Cont    Result Date: 2019  EXAM:  CT abdomen pelvis with contrast INDICATION: Abdominal pain and fever. Inflammatory bowel disease. COMPARISON: None.  TECHNIQUE: Helical CT of the abdomen  and pelvis  following the uneventful intravenous administration of nonionic contrast.  Coronal and sagittal reformats are performed. CT dose reduction was achieved through use of a standardized protocol tailored for this examination and automatic exposure control for dose modulation. Adaptive statistical iterative reconstruction (ASIR) was utilized. FINDINGS: The visualized lung bases demonstrate no mass or consolidation. The heart size is normal. There is no pericardial or pleural effusion. The liver, spleen, pancreas, and adrenal glands are normal. The gall bladder is present  without intra- or extra-hepatic biliary dilatation. The kidneys are symmetric without hydronephrosis. There is marked inflammation involving small bowel loops in the right lower abdomen where there is distal ileum thickening and adjacent inflammation as well as mucosal hyperenhancement. 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 Enhancing but nonenlarged right lower mesenteric lymph nodes are likely reactive. There are no dilated bowel loops. The appendix is unremarkable. There are no enlarged lymph nodes. There is no free air. The urinary bladder is normal.  There is no pelvic mass. An IUD is present. The bony structures are age-appropriate. IMPRESSION: Marked inflammatory changes involving the distal small bowel loops in the right lower quadrant in keeping with history of Crohn disease. 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 No bowel obstruction. Normal appendix. Xr Chest Port    Result Date: 9/8/2019  EXAM:  CR chest portable INDICATION: Fever. Abdominal pain. COMPARISON: None. TECHNIQUE: Portable AP upright chest view at 1956 hours FINDINGS: Cardiac monitoring wires overlie the thorax. The cardiomediastinal contours are within normal limits. The lungs and pleural spaces are clear.  There is no pneumothorax. The bones and upper abdomen are unremarkable. IMPRESSION: No acute process. No results found for: SDES  Lab Results   Component Value Date/Time    Culture result: NO GROWTH 4 DAYS 09/16/2019 06:50 PM    Culture result: NO GROWTH 6 DAYS 09/08/2019 06:25 PM    Culture result: NO GROWTH 6 DAYS 09/08/2019 06:25 PM     All Micro Results     Procedure Component Value Units Date/Time    CULTURE, Render Pancoast STAIN [[id number]] Collected:  09/16/19 1850    Order Status:  Completed Specimen:  Misc. Wound Updated:  09/20/19 0919     Special Requests: ILEOCLIC ABSCESS, LOOK FPR ANAEROBES     GRAM STAIN RARE WBCS SEEN         NO ORGANISMS SEEN        Culture result: NO GROWTH 4 DAYS       OVA & PARASITES, STOOL [[id number]] Collected:  09/10/19 1114    Order Status:  Completed Specimen:  Stool Updated:  09/19/19 1035     Source STOOL        Ova & Parasite exam Final Report Below     Comment: (NOTE)  These results were obtained using wet preparation(s) and trichrome  stained smear. This test does not include testing for  Cryptosporidium parvum, Cyclospora, or Microsporidia.   Performed At: [address] [facility] [id number]  Leny PARKS MD BB:[id number]         CULTURE, BLOOD [[id number]] Collected:  09/08/19 1825    Order Status:  Completed Specimen:  Blood Updated:  09/14/19 0610     Special Requests: NO SPECIAL REQUESTS        Culture result: NO GROWTH 6 DAYS       CULTURE, BLOOD [[id number]] Collected:  09/08/19 1825    Order Status:  Completed Specimen:  Blood Updated:  09/14/19 0610     Special Requests: NO SPECIAL REQUESTS        Culture result: NO GROWTH 6 DAYS       CULTURE, STOOL [[id number]]  (Abnormal) Collected:  09/09/19 1114    Order Status:  Completed Specimen:  Stool Updated:  09/11/19 1553     Shigella species, DNA NEGATIVE         Campylobacter species, DNA NEGATIVE         Vibrio species, DNA NEGATIVE         Enterotoxigen E Coli, DNA NEGATIVE         Shiga toxin producing, DNA NEGATIVE         Salmonella species, DNA NEGATIVE         P. shigelloides, DNA POSITIVE        Y. enterocolitica, DNA NEGATIVE        C. DIFFICILE AG & TOXIN A/B [[id number]] Collected:  09/10/19 1114    Order Status:  Completed Specimen:  Stool Updated:  09/11/19 1204     [address] ANTIGEN NEGATIVE         C. difficile toxin NEGATIVE         INTERPRETATION       NEGATIVE FOR TOXIGENIC C. DIFFICILE          URINE CULTURE HOLD SAMPLE [[id number]] Collected:  09/08/19 1838    Order Status:  Completed Specimen:  Urine from Serum Updated:  09/08/19 1846     Urine culture hold       URINE ON HOLD IN MICROBIOLOGY DEPT FOR 3 DAYS. IF UNPRESERVED URINE IS SUBMITTED, IT CANNOT BE USED FOR ADDITIONAL TESTING AFTER 24 HRS, RECOLLECTION WILL BE REQUIRED. Labs: reviewed by myself. Recent Labs     09/20/19 0345 09/19/19 1955 09/19/19 0625   WBC 4.9  --  3.1*   HGB 7.3* 7.2* 6.3*   HCT 23.8* 23.4* 21.0*     --  227     Recent Labs     09/20/19 0345 09/19/19 0625 09/18/19  0612    142 141   K 4.0 4.0 4.0    107 109*   CO2 29 30 27   BUN 6 9 6   CREA 0.50* 0.55 0.47*   GLU 81 79 83   CA 8.5 8.3* 7.9*     No results for input(s): SGOT, GPT, ALT, AP, TBIL, TBILI, TP, ALB, GLOB, GGT, AML, LPSE in the last 72 hours. No lab exists for component: AMYP, HLPSE  No results for input(s): INR, PTP, APTT in the last 72 hours. No lab exists for component: INREXT, INREXT   No results for input(s): FE, TIBC, PSAT, FERR in the last 72 hours. No results found for: FOL, RBCF   No results for input(s): PH, PCO2, PO2 in the last 72 hours. No results for input(s): CPK, CKNDX, TROIQ in the last 72 hours.     No lab exists for component: CPKMB  No results found for: CHOL, CHOLX, CHLST, CHOLV, HDL, HDLP, LDL, LDLC, DLDLP, TGLX, TRIGL, TRIGP, CHHD, CHHDX  No results found for: [facility]  Lab Results   Component Value Date/Time    Color YELLOW/STRAW 09/08/2019 06:38 PM    Appearance CLEAR 09/08/2019 06:38 PM    Specific gravity 1.008 09/08/2019 06:38 PM    pH (UA) 7.5 09/08/2019 06:38 PM    Protein NEGATIVE  09/08/2019 06:38 PM    Glucose NEGATIVE  09/08/2019 06:38 PM    Ketone NEGATIVE  09/08/2019 06:38 PM    Bilirubin NEGATIVE  09/08/2019 06:38 PM    Urobilinogen 0.2 09/08/2019 06:38 PM    Nitrites NEGATIVE  09/08/2019 06:38 PM    Leukocyte Esterase NEGATIVE  09/08/2019 06:38 PM    Epithelial cells FEW 09/08/2019 06:38 PM    Bacteria NEGATIVE  09/08/2019 06:38 PM    WBC 0-4 09/08/2019 06:38 PM    RBC 0-5 09/08/2019 06:38 PM         Medications Reviewed:     Current Facility-Administered Medications   Medication Dose Route Frequency    ferrous sulfate tablet 325 mg  1 Tab Oral BID WITH MEALS    0.9% sodium chloride infusion 250 mL  250 mL IntraVENous PRN    pantoprazole (PROTONIX) 40 mg in sodium chloride 0.9% 10 mL injection  40 mg IntraVENous DAILY    oxyCODONE IR (ROXICODONE) tablet 10 mg  10 mg Oral Q4H PRN    influenza vaccine 2019-20 (6 mos+)(PF) (FLUARIX/FLULAVAL/FLUZONE QUAD) injection 0.5 mL  0.5 mL IntraMUSCular PRIOR TO DISCHARGE    HYDROmorphone (PF) (DILAUDID) injection 1 mg  1 mg IntraVENous Q3H PRN    acetaminophen (TYLENOL) tablet 1,000 mg  1,000 mg Oral Q6H    naloxone (NARCAN) injection 0.4 mg  0.4 mg IntraVENous PRN    oxyCODONE IR (ROXICODONE) tablet 5 mg  5 mg Oral Q4H PRN    ondansetron (ZOFRAN ODT) tablet 4 mg  4 mg Oral Q4H PRN    acetaminophen (TYLENOL) tablet 650 mg  650 mg Oral Q6H PRN    0.9% sodium chloride infusion  75 mL/hr IntraVENous CONTINUOUS    piperacillin-tazobactam (ZOSYN) 3.375 g in 0.9% sodium chloride (MBP/ADV) 100 mL  3.375 g IntraVENous Q8H    sodium chloride (NS) flush 5-10 mL  5-10 mL IntraVENous PRN    sodium chloride (NS) flush 5-40 mL  5-40 mL IntraVENous Q8H    sodium chloride (NS) flush 5-40 mL  5-40 mL IntraVENous PRN    ondansetron (ZOFRAN) injection 4 mg  4 mg IntraVENous Q4H PRN    mesalamine DR (ASACOL HD) tablet 1,600 mg 1,600 mg Oral TID    escitalopram oxalate (LEXAPRO) tablet 20 mg  20 mg Oral DAILY     ______________________________________________________________________  EXPECTED LENGTH OF STAY: 4d 19h  ACTUAL LENGTH OF STAY:          12                 Jerome Dong MD     Patient's emergency contacts:  Extended Emergency Contact Information  Primary Emergency Contact: [address]  Mobile Phone: [phone]  Relation: Mother

## 2019-09-20 NOTE — PROGRESS NOTES
Bedside shift change report given to [address] (oncoming nurse) by Joselo Singh RN (offgoing nurse). Report included the following information SBAR and Kardex.

## 2019-09-20 NOTE — PROGRESS NOTES
Doing well. HGb stable today  Visit Vitals  BP 96/52 (BP 1 Location: Left arm, BP Patient Position: Supine)   Pulse 76   Temp 98.1 °F (36.7 °C)   Resp 18   Ht 5' 5\" (1.651 m)   Wt 68.4 kg (150 lb 11.2 oz)   SpO2 99%   Breastfeeding? No   BMI 25.08 kg/m²     Date 09/19/19 0700 - 09/20/19 0659 09/20/19 0700 - 09/21/19 0659   Shift 8365-7552 5378-8366 24 Hour Total 2028-4998 5609-0254 24 Hour Total   INTAKE   P.O. 900  900        P. O. 900  900      Blood 310  310        Volume (TRANSFUSE PACKED RBC'S) 310  310      Shift Total(mL/kg) 1210(17.7)  1210(17.7)      OUTPUT   Urine(mL/kg/hr) 1600(2) 1850(2.3) 3450(2.1)        Urine Voided 1600 1850 3450      Stool 100 200 300        Stool Occurrence(s) 2 x 2 x 4 x        Stool 100 200 300      Shift Total(mL/kg) 1700(24.9) 2050(30) 3750(54.9)      NET -490 -2050 -2540      Weight (kg) 68.4 68.4 68.4 68.4 68.4 68.4     abd soft    A/p doing well  OK to be discharged from my standpoint. Would recommend iron supplementation for now.    ABX per Dr. Yasmin Niño  Pain is much better controlled

## 2019-09-20 NOTE — PROGRESS NOTES
Infectious Diseases Progress Note    Antibiotic Summary:  Zosyn   -- present    Robotic ileocolic resection and drainage of abscess on 2019    Subjective:     Her pain was better today; eating more; frequent loose BMs today    Objective:     Vitals:   Visit Vitals  /79   Pulse 80   Temp 97.9 °F (36.6 °C)   Resp 16   Ht 5' 5\" (1.651 m)   Wt 68.4 kg (150 lb 11.2 oz)   LMP 2019   SpO2 99%   Breastfeeding? No   BMI 25.08 kg/m²        Tmax:  Temp (24hrs), Av.2 °F (36.8 °C), Min:97.8 °F (36.6 °C), Max:98.7 °F (37.1 °C)      Exam:  General appearance: alert, no distress  Lungs: clear to auscultation bilaterally  Heart: regular rate and rhythm  Abdomen: soft, not distended, mildly tender; good BS  Skin: no rash    IV Lines: peripheral    Labs:    Recent Labs     19  1955 19  0625 19  0612 19  0215   WBC  --  3.1* 5.2 8.4   HGB 7.2* 6.3* 7.1* 9.5*   PLT  --  227 246 338   BUN  --  9 6 8   CREA  --  0.55 0.47* 0.72     Intraop culture of RLQ abscess on 2019:   Gram stain = rare WBC; NOS   Culture = NGSF    Assessment:     1. Active Crohns with evolving fistulae and abdominopelvic abscess -- S/P robotic ileocolic resection and drainage of abscess on : stable postop; improving and intraop cultures have been negative    Plan:     1.  Kesha Patricio MD

## 2019-09-21 VITALS
HEART RATE: 79 BPM | TEMPERATURE: 98.3 F | BODY MASS INDEX: 24.76 KG/M2 | SYSTOLIC BLOOD PRESSURE: 96 MMHG | RESPIRATION RATE: 16 BRPM | DIASTOLIC BLOOD PRESSURE: 62 MMHG | HEIGHT: 65 IN | WEIGHT: 148.59 LBS | OXYGEN SATURATION: 96 %

## 2019-09-21 PROBLEM — K65.1 ABSCESS OF ABDOMINAL CAVITY (HCC): Status: RESOLVED | Noted: 2019-09-08 | Resolved: 2019-09-21

## 2019-09-21 LAB
ANION GAP SERPL CALC-SCNC: 5 MMOL/L (ref 5–15)
BASOPHILS # BLD: 0 K/UL (ref 0–0.1)
BASOPHILS NFR BLD: 0 % (ref 0–1)
BUN SERPL-MCNC: 6 MG/DL (ref 6–20)
BUN/CREAT SERPL: 13 (ref 12–20)
CALCIUM SERPL-MCNC: 8.6 MG/DL (ref 8.5–10.1)
CHLORIDE SERPL-SCNC: 101 MMOL/L (ref 97–108)
CO2 SERPL-SCNC: 29 MMOL/L (ref 21–32)
CREAT SERPL-MCNC: 0.48 MG/DL (ref 0.55–1.02)
DIFFERENTIAL METHOD BLD: ABNORMAL
EOSINOPHIL # BLD: 0.2 K/UL (ref 0–0.4)
EOSINOPHIL NFR BLD: 3 % (ref 0–7)
ERYTHROCYTE [DISTWIDTH] IN BLOOD BY AUTOMATED COUNT: 13.2 % (ref 11.5–14.5)
GLUCOSE SERPL-MCNC: 89 MG/DL (ref 65–100)
HCT VFR BLD AUTO: 24.2 % (ref 35–47)
HGB BLD-MCNC: 7.6 G/DL (ref 11.5–16)
IMM GRANULOCYTES # BLD AUTO: 0 K/UL
IMM GRANULOCYTES NFR BLD AUTO: 0 %
LYMPHOCYTES # BLD: 0.6 K/UL (ref 0.8–3.5)
LYMPHOCYTES NFR BLD: 10 % (ref 12–49)
MCH RBC QN AUTO: 26.4 PG (ref 26–34)
MCHC RBC AUTO-ENTMCNC: 31.4 G/DL (ref 30–36.5)
MCV RBC AUTO: 84 FL (ref 80–99)
MONOCYTES # BLD: 0.5 K/UL (ref 0–1)
MONOCYTES NFR BLD: 9 % (ref 5–13)
NEUTS BAND NFR BLD MANUAL: 2 % (ref 0–6)
NEUTS SEG # BLD: 4.3 K/UL (ref 1.8–8)
NEUTS SEG NFR BLD: 76 % (ref 32–75)
NRBC # BLD: 0 K/UL (ref 0–0.01)
NRBC BLD-RTO: 0 PER 100 WBC
PLATELET # BLD AUTO: 286 K/UL (ref 150–400)
PLATELET COMMENTS,PCOM: ABNORMAL
PMV BLD AUTO: 9.7 FL (ref 8.9–12.9)
POTASSIUM SERPL-SCNC: 3.5 MMOL/L (ref 3.5–5.1)
RBC # BLD AUTO: 2.88 M/UL (ref 3.8–5.2)
RBC MORPH BLD: ABNORMAL
SODIUM SERPL-SCNC: 135 MMOL/L (ref 136–145)
WBC # BLD AUTO: 5.6 K/UL (ref 3.6–11)

## 2019-09-21 PROCEDURE — 74011250636 HC RX REV CODE- 250/636: Performed by: INTERNAL MEDICINE

## 2019-09-21 PROCEDURE — 85025 COMPLETE CBC W/AUTO DIFF WBC: CPT

## 2019-09-21 PROCEDURE — 90686 IIV4 VACC NO PRSV 0.5 ML IM: CPT | Performed by: INTERNAL MEDICINE

## 2019-09-21 PROCEDURE — 36415 COLL VENOUS BLD VENIPUNCTURE: CPT

## 2019-09-21 PROCEDURE — 74011000258 HC RX REV CODE- 258: Performed by: INTERNAL MEDICINE

## 2019-09-21 PROCEDURE — 74011250637 HC RX REV CODE- 250/637: Performed by: PHYSICIAN ASSISTANT

## 2019-09-21 PROCEDURE — C9113 INJ PANTOPRAZOLE SODIUM, VIA: HCPCS | Performed by: INTERNAL MEDICINE

## 2019-09-21 PROCEDURE — 74011250637 HC RX REV CODE- 250/637: Performed by: COLON & RECTAL SURGERY

## 2019-09-21 PROCEDURE — 80048 BASIC METABOLIC PNL TOTAL CA: CPT

## 2019-09-21 PROCEDURE — 90471 IMMUNIZATION ADMIN: CPT

## 2019-09-21 PROCEDURE — 74011250637 HC RX REV CODE- 250/637: Performed by: INTERNAL MEDICINE

## 2019-09-21 PROCEDURE — 74011000250 HC RX REV CODE- 250: Performed by: INTERNAL MEDICINE

## 2019-09-21 PROCEDURE — 74011250636 HC RX REV CODE- 250/636: Performed by: COLON & RECTAL SURGERY

## 2019-09-21 RX ORDER — LANOLIN ALCOHOL/MO/W.PET/CERES
325 CREAM (GRAM) TOPICAL
Qty: 90 TAB | Refills: 0 | Status: SHIPPED | OUTPATIENT
Start: 2019-09-21 | End: 2019-11-18 | Stop reason: ALTCHOICE

## 2019-09-21 RX ORDER — ONDANSETRON 4 MG/1
4 TABLET, ORALLY DISINTEGRATING ORAL
Qty: 15 TAB | Refills: 0 | Status: SHIPPED | OUTPATIENT
Start: 2019-09-21 | End: 2019-11-18 | Stop reason: ALTCHOICE

## 2019-09-21 RX ORDER — OXYCODONE HYDROCHLORIDE 5 MG/1
5 TABLET ORAL
Qty: 20 TAB | Refills: 0 | Status: SHIPPED | OUTPATIENT
Start: 2019-09-21 | End: 2019-09-24

## 2019-09-21 RX ADMIN — PIPERACILLIN SODIUM,TAZOBACTAM SODIUM 3.38 G: 3; .375 INJECTION, POWDER, FOR SOLUTION INTRAVENOUS at 01:21

## 2019-09-21 RX ADMIN — Medication 10 ML: at 05:26

## 2019-09-21 RX ADMIN — ACETAMINOPHEN 1000 MG: 500 TABLET ORAL at 08:43

## 2019-09-21 RX ADMIN — OXYCODONE HYDROCHLORIDE 10 MG: 5 TABLET ORAL at 05:26

## 2019-09-21 RX ADMIN — HYDROMORPHONE HYDROCHLORIDE 1 MG: 1 INJECTION, SOLUTION INTRAMUSCULAR; INTRAVENOUS; SUBCUTANEOUS at 02:39

## 2019-09-21 RX ADMIN — INFLUENZA VIRUS VACCINE 0.5 ML: 15; 15; 15; 15 SUSPENSION INTRAMUSCULAR at 10:51

## 2019-09-21 RX ADMIN — PIPERACILLIN SODIUM,TAZOBACTAM SODIUM 3.38 G: 3; .375 INJECTION, POWDER, FOR SOLUTION INTRAVENOUS at 08:35

## 2019-09-21 RX ADMIN — SODIUM CHLORIDE 40 MG: 9 INJECTION INTRAMUSCULAR; INTRAVENOUS; SUBCUTANEOUS at 08:41

## 2019-09-21 RX ADMIN — MESALAMINE 1600 MG: 800 TABLET, DELAYED RELEASE ORAL at 08:43

## 2019-09-21 RX ADMIN — SODIUM CHLORIDE 75 ML/HR: 900 INJECTION, SOLUTION INTRAVENOUS at 08:38

## 2019-09-21 RX ADMIN — OXYCODONE HYDROCHLORIDE 10 MG: 5 TABLET ORAL at 01:21

## 2019-09-21 RX ADMIN — ESCITALOPRAM OXALATE 20 MG: 10 TABLET ORAL at 08:43

## 2019-09-21 RX ADMIN — ACETAMINOPHEN 1000 MG: 500 TABLET ORAL at 02:39

## 2019-09-21 RX ADMIN — FERROUS SULFATE TAB 325 MG (65 MG ELEMENTAL FE) 325 MG: 325 (65 FE) TAB at 08:44

## 2019-09-21 NOTE — DISCHARGE SUMMARY
Inpatient hospitalist discharge summary                Brief Overview    PATIENT ID: Vinay Lynn    MRN: [id number]     YOB: 1995    Admitting Provider: Graciela Gaona DO    Discharging Provider: Michael Miranda MD   To contact this individual call [phone] and ask the  to page. If unavailable ask to be transferred the Adult Hospitalist Department. PCP at discharge: Timur Josue 52   [address] [place] [address] 20 [facility], [place], operated [facility] / [place] [address]    Admission date: 9/8/2019  Date of Discharge: 09/21/19    Chief complaint:   Chief Complaint   Patient presents with    Inflammatory Bowel Disease    Abdominal Pain    Fever    Melena     Patient Active Problem List   Diagnosis Code    Crohn disease ([facility] [address] K50.90    Plantar wart of right foot B07.0    Psoriasis L40.9    Anxiety F41.9         Discharge diagnosis, hospital course/plan:  Sepsis with intra-abdominal abscess - resolved   Secondary to Crohn's disease    - CT abd: reviewed  - IR consulted for abscess drainage but it was not done due ot possibility of adhesion  - s/p ileocolic resection 7/79, biopsy shows abscess  - stool cx positive for P. 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 Patient was hemodynamically stable, offering no new complaints.  Denies any shortness of breath at rest, no fevers or chills, no diarrhea or constipation. Patient is agreeable for discharge. Patient understood and verbalized the understanding of the discharge plan. Patient was advised to seek medical help/ care or return to ED, if symptoms recur, worsen or new symptoms develop. Discharge Disposition:  Home or Self Care    Discharge activity:  Ambulate in house    Code status at discharge:  Full Code     Active issues requiring follow up:  Ovarian cyst  Crohn's disease    Outpatient follow up:    Future appointments-  No future appointments. Follow-up Information     Follow up With Specialties Details Why Contact Info    Jina Leigh MD [facility] Schedule an appointment as soon as possible for a visit for post-hospitalization follow up, with in 7 days, for follow Up [facility] [address] [place] [address]      Jay Hinton MD Colon and Rectal Surgery Schedule an appointment as soon as possible for a visit as recommended [address] [facility]  [address]  [facility]      Haseeb Patton MD Gastroenterology Schedule an appointment as soon as possible for a visit for follow Up [place] [address] [phone]            Test results pending upon discharge:  Biopsy reports    Operative procedures performed:  Procedure(s):  ROBOTIC ILEOCOLIC RESECTION WITH REPAIR OF SEROSAL TEAR    Treatments: IV Zosyn    Consults:  IP CONSULT TO COLORECTAL SURGERY  IP CONSULT TO GENERAL SURGERY  IP CONSULT TO GASTROENTEROLOGY  IP CONSULT TO INFECTIOUS DISEASES  IP CONSULT TO HOSPITALIST    Procedures:    Procedure(s):  ROBOTIC ILEOCOLIC RESECTION WITH REPAIR OF SEROSAL TEAR    Diet:  DIET REGULAR  DIET NUTRITIONAL SUPPLEMENTS  DIET ONE TIME MESSAGE    Pertinent test results:  Ct Abd Pelv W Cont    Result Date: 9/12/2019  INDICATION: Crohn's disease, evaluate for abscess.  CT of the abdomen and pelvis is performed with 5 mm collimation. Study is performed with PO and 100 cc of nonionic Isovue 370. Sagittal and coronal reformatted images were also performed. CT dose reduction was achieved with the use of the standardized protocol tailored for this examination and automatic exposure control for dose modulation. Direct comparison is made to prior CT dated September 8, 2019. Findings: Lung bases: The visualized lung bases are clear. Liver: The liver is normal. Adrenals: Adrenal glands are normal. Pancreas: The pancreas is normal. Gallbladder: The gallbladder is normal. Kidneys: The kidneys are normal. Spleen: The spleen is normal. Lymph nodes. There is no yonny hepatitis, mesenteric, retroperitoneal or pelvic lymphadenopathy. Bowel: The small bowel is opacified with oral contrast to the level of the distal transverse colon. There is probable dilute contrast within the descending colon. There is marked thickening of a segment of terminal ileum, measuring approximately 15 cm in length. Thickened segment of ileum is unchanged in distribution compared to prior CT dated September 2019. There is also thickening of the medial aspect of the cecum. There is persistent luminal narrowing of the distal terminal ileum. There has been interval resolution of the dilatation of the more proximal terminal ileum. Within the right lower quadrant of the abdomen, there is persistent irregular soft tissue tethering multiple loops of small bowel; specifically, there is extension from the terminal ileum to the a more proximal segment of thickened ileum and there is gas and fluid but no oral contrast within this fistula/collection. Overall size of the fistula\collection has decreased slightly compared prior CT dated September 8, 2019 but is difficult to measure. There is also soft tissue tethering extending between additional components of the terminal ileum.  There is 4.3 cm x 5 cm focus of fluid adjacent to thickened loops of small bowel, measuring approximately 5.3 cm x 6.2 cm in prior CT dated September 2019, possibly representing a right ovarian cyst. The small amount of pelvic free intraperitoneal fluid, seen on prior CT dated September 2019, has resolved. Appendix: The appendix is normal. Urinary bladder: Urinary bladder is partially filled and grossly normal. Miscellaneous: IUD is present within the uterus. IMPRESSION: Extensive lower quadrant inflammatory process tethering multiple loops of distal small bowel consistent with clinical history of Crohn's disease and multiple forming enteroenteric fistulas. Ct Abd Pelv W Cont    Result Date: 9/8/2019  EXAM:  CT abdomen pelvis with contrast INDICATION: Abdominal pain and fever. Inflammatory bowel disease. COMPARISON: None. TECHNIQUE: Helical CT of the abdomen  and pelvis  following the uneventful intravenous administration of nonionic contrast.  Coronal and sagittal reformats are performed. CT dose reduction was achieved through use of a standardized protocol tailored for this examination and automatic exposure control for dose modulation. Adaptive statistical iterative reconstruction (ASIR) was utilized. FINDINGS: The visualized lung bases demonstrate no mass or consolidation. The heart size is normal. There is no pericardial or pleural effusion. The liver, spleen, pancreas, and adrenal glands are normal. The gall bladder is present  without intra- or extra-hepatic biliary dilatation. The kidneys are symmetric without hydronephrosis. There is marked inflammation involving small bowel loops in the right lower abdomen where there is distal ileum thickening and adjacent inflammation as well as mucosal hyperenhancement. 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 Enhancing but nonenlarged right lower mesenteric lymph nodes are likely reactive. There are no dilated bowel loops. The appendix is unremarkable. There are no enlarged lymph nodes. There is no free air. The urinary bladder is normal.  There is no pelvic mass. An IUD is present. The bony structures are age-appropriate. IMPRESSION: Marked inflammatory changes involving the distal small bowel loops in the right lower quadrant in keeping with history of Crohn disease. 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 No bowel obstruction. Normal appendix. Xr Chest Port    Result Date: 9/8/2019  EXAM:  CR chest portable INDICATION: Fever. Abdominal pain. COMPARISON: None. TECHNIQUE: Portable AP upright chest view at 1956 hours FINDINGS: Cardiac monitoring wires overlie the thorax. The cardiomediastinal contours are within normal limits. The lungs and pleural spaces are clear. There is no pneumothorax. The bones and upper abdomen are unremarkable. IMPRESSION: No acute process. Recent Results (from the past 336 hour(s))   CBC WITH AUTOMATED DIFF    Collection Time: 09/08/19  6:25 PM   Result Value Ref Range    WBC 8.1 3.6 - 11.0 K/uL    RBC 4.72 3.80 - 5.20 M/uL    HGB 12.3 11.5 - 16.0 g/dL    HCT 39.9 35.0 - 47.0 %    MCV 84.5 80.0 - 99.0 FL    MCH 26.1 26.0 - 34.0 PG    MCHC 30.8 30.0 - 36.5 g/dL    RDW 12.8 11.5 - 14.5 %    PLATELET 591 333 - 150 K/uL    MPV 10.0 8.9 - 12.9 FL    NRBC 0.0 0  WBC    ABSOLUTE NRBC 0.00 0.00 - 0.01 K/uL    NEUTROPHILS 88 (H) 32 - 75 %    LYMPHOCYTES 3 (L) 12 - 49 %    MONOCYTES 8 5 - 13 %    EOSINOPHILS 0 0 - 7 %    BASOPHILS 0 0 - 1 %    IMMATURE GRANULOCYTES 1 (H) 0.0 - 0.5 %    ABS. NEUTROPHILS 7.2 1.8 - 8.0 K/UL    ABS. LYMPHOCYTES 0.2 (L) 0.8 - 3.5 K/UL    ABS. MONOCYTES 0.6 0.0 - 1.0 K/UL    ABS. EOSINOPHILS 0.0 0.0 - 0.4 K/UL    ABS. BASOPHILS 0.0 0.0 - 0.1 K/UL    ABS. IMM.  GRANS. 0.1 (H) 0.00 - 0.04 K/UL    DF SMEAR SCANNED      RBC COMMENTS NORMOCYTIC, NORMOCHROMIC     METABOLIC PANEL, COMPREHENSIVE    Collection Time: 09/08/19  6:25 PM   Result Value Ref Range    Sodium 133 (L) 136 - 145 mmol/L    Potassium 3.6 3.5 - 5.1 mmol/L    Chloride 97 97 - 108 mmol/L    CO2 29 21 - 32 mmol/L    Anion gap 7 5 - 15 mmol/L    Glucose 119 (H) 65 - 100 mg/dL    BUN 6 6 - 20 MG/DL    Creatinine 0.75 0.55 - 1.02 MG/DL    BUN/Creatinine ratio 8 (L) 12 - 20      GFR est AA >60 >60 ml/min/1.73m2    GFR est non-AA >60 >60 ml/min/1.73m2    Calcium 9.1 8.5 - 10.1 MG/DL    Bilirubin, total 0.5 0.2 - 1.0 MG/DL    ALT (SGPT) 16 12 - 78 U/L    AST (SGOT) 12 (L) 15 - 37 U/L    Alk. phosphatase 80 45 - 117 U/L    Protein, total 8.7 (H) 6.4 - 8.2 g/dL    Albumin 3.2 (L) 3.5 - 5.0 g/dL    Globulin 5.5 (H) 2.0 - 4.0 g/dL    A-G Ratio 0.6 (L) 1.1 - 2.2     SAMPLES BEING HELD    Collection Time: 09/08/19  6:25 PM   Result Value Ref Range    SAMPLES BEING HELD 1RED,1BLU     COMMENT        Add-on orders for these samples will be processed based on acceptable specimen integrity and analyte stability, which may vary by analyte. CULTURE, BLOOD    Collection Time: 09/08/19  6:25 PM   Result Value Ref Range    Special Requests: NO SPECIAL REQUESTS      Culture result: NO GROWTH 6 DAYS     CULTURE, BLOOD    Collection Time: 09/08/19  6:25 PM   Result Value Ref Range    Special Requests: NO SPECIAL REQUESTS      Culture result: NO GROWTH 6 DAYS     POC LACTIC ACID    Collection Time: 09/08/19  6:30 PM   Result Value Ref Range    Lactic Acid (POC) 0.45 0.40 - 2.00 mmol/L   URINE CULTURE HOLD SAMPLE    Collection Time: 09/08/19  6:38 PM   Result Value Ref Range    Urine culture hold        URINE ON HOLD IN MICROBIOLOGY DEPT FOR 3 DAYS. IF UNPRESERVED URINE IS SUBMITTED, IT CANNOT BE USED FOR ADDITIONAL TESTING AFTER 24 HRS, RECOLLECTION WILL BE REQUIRED.    URINALYSIS W/MICROSCOPIC    Collection Time: 09/08/19  6:38 PM   Result Value Ref Range    Color YELLOW/STRAW      Appearance CLEAR CLEAR      Specific gravity 1.008 1.003 - 1.030      pH (UA) 7.5 5.0 - 8.0 Protein NEGATIVE  NEG mg/dL    Glucose NEGATIVE  NEG mg/dL    Ketone NEGATIVE  NEG mg/dL    Bilirubin NEGATIVE  NEG      Blood NEGATIVE  NEG      Urobilinogen 0.2 0.2 - 1.0 EU/dL    Nitrites NEGATIVE  NEG      Leukocyte Esterase NEGATIVE  NEG      WBC 0-4 0 - 4 /hpf    RBC 0-5 0 - 5 /hpf    Epithelial cells FEW FEW /lpf    Bacteria NEGATIVE  NEG /hpf    Hyaline cast 0-2 0 - 5 /lpf   EKG, 12 LEAD, INITIAL    Collection Time: 09/08/19  6:39 PM   Result Value Ref Range    Ventricular Rate 97 BPM    Atrial Rate 97 BPM    P-R Interval 128 ms    QRS Duration 78 ms    Q-T Interval 302 ms    QTC Calculation (Bezet) 383 ms    Calculated P Axis 46 degrees    Calculated R Axis 42 degrees    Calculated T Axis 23 degrees    Diagnosis       Normal sinus rhythm  No previous ECGs available  Confirmed by Bennett Mcmillan M.D., [id number] on 9/9/2019 9:39:58 AM     HCG URINE, QL. - POC    Collection Time: 09/08/19  6:51 PM   Result Value Ref Range    Pregnancy test,urine (POC) NEGATIVE  NEG     ENTERIC BACT.  PANEL, DNA    Collection Time: 09/09/19 11:14 AM   Result Value Ref Range    Shigella species, DNA NEGATIVE  NEG      Campylobacter species, DNA NEGATIVE  NEG      Vibrio species, DNA NEGATIVE  NEG      Enterotoxigen E Coli, DNA NEGATIVE  NEG      Shiga toxin producing, DNA NEGATIVE  NEG      Salmonella species, DNA NEGATIVE  NEG      P. shigelloides, DNA POSITIVE (A) NEG      Y. enterocolitica, DNA NEGATIVE  NEG     C. DIFFICILE AG & TOXIN A/B    Collection Time: 09/10/19 11:14 AM   Result Value Ref Range    GDH ANTIGEN NEGATIVE  NEG      C. difficile toxin NEGATIVE  NEG      INTERPRETATION NEGATIVE FOR TOXIGENIC C. DIFFICILE NTXCD     OVA & PARASITES, STOOL    Collection Time: 09/10/19 11:14 AM   Result Value Ref Range    Source STOOL      Ova & Parasite exam Final Report Below    OVA & PARASITES, STOOL, REFLEX    Collection Time: 09/10/19 11:14 AM   Result Value Ref Range    Result 1 Comment     CBC WITH AUTOMATED DIFF Collection Time: 09/12/19  2:10 AM   Result Value Ref Range    WBC 6.2 3.6 - 11.0 K/uL    RBC 3.93 3.80 - 5.20 M/uL    HGB 10.2 (L) 11.5 - 16.0 g/dL    HCT 33.4 (L) 35.0 - 47.0 %    MCV 85.0 80.0 - 99.0 FL    MCH 26.0 26.0 - 34.0 PG    MCHC 30.5 30.0 - 36.5 g/dL    RDW 12.7 11.5 - 14.5 %    PLATELET 013 733 - 994 K/uL    MPV 9.9 8.9 - 12.9 FL    NRBC 0.0 0  WBC    ABSOLUTE NRBC 0.00 0.00 - 0.01 K/uL    NEUTROPHILS 74 32 - 75 %    BAND NEUTROPHILS 10 (H) 0 - 6 %    LYMPHOCYTES 6 (L) 12 - 49 %    MONOCYTES 9 5 - 13 %    EOSINOPHILS 1 0 - 7 %    BASOPHILS 0 0 - 1 %    IMMATURE GRANULOCYTES 0 %    ABS. NEUTROPHILS 5.1 1.8 - 8.0 K/UL    ABS. LYMPHOCYTES 0.4 (L) 0.8 - 3.5 K/UL    ABS. MONOCYTES 0.6 0.0 - 1.0 K/UL    ABS. EOSINOPHILS 0.1 0.0 - 0.4 K/UL    ABS. BASOPHILS 0.0 0.0 - 0.1 K/UL    ABS. IMM.  GRANS. 0.0 K/UL    DF MANUAL      PLATELET COMMENTS Large Platelets      RBC COMMENTS NORMOCYTIC, NORMOCHROMIC     METABOLIC PANEL, BASIC    Collection Time: 09/12/19  2:10 AM   Result Value Ref Range    Sodium 137 136 - 145 mmol/L    Potassium 3.9 3.5 - 5.1 mmol/L    Chloride 102 97 - 108 mmol/L    CO2 28 21 - 32 mmol/L    Anion gap 7 5 - 15 mmol/L    Glucose 104 (H) 65 - 100 mg/dL    BUN 7 6 - 20 MG/DL    Creatinine 0.56 0.55 - 1.02 MG/DL    BUN/Creatinine ratio 13 12 - 20      GFR est AA >60 >60 ml/min/1.73m2    GFR est non-AA >60 >60 ml/min/1.73m2    Calcium 8.5 8.5 - 10.1 MG/DL   CBC WITH AUTOMATED DIFF    Collection Time: 09/16/19  3:14 AM   Result Value Ref Range    WBC 5.3 3.6 - 11.0 K/uL    RBC 4.33 3.80 - 5.20 M/uL    HGB 11.1 (L) 11.5 - 16.0 g/dL    HCT 36.4 35.0 - 47.0 %    MCV 84.1 80.0 - 99.0 FL    MCH 25.6 (L) 26.0 - 34.0 PG    MCHC 30.5 30.0 - 36.5 g/dL    RDW 12.6 11.5 - 14.5 %    PLATELET 432 433 - 819 K/uL    MPV 9.4 8.9 - 12.9 FL    NRBC 0.0 0  WBC    ABSOLUTE NRBC 0.00 0.00 - 0.01 K/uL    NEUTROPHILS 69 32 - 75 %    LYMPHOCYTES 12 12 - 49 %    MONOCYTES 14 (H) 5 - 13 %    EOSINOPHILS 4 0 - 7 %    BASOPHILS 1 0 - 1 %    IMMATURE GRANULOCYTES 0 0.0 - 0.5 %    ABS. NEUTROPHILS 3.7 1.8 - 8.0 K/UL    ABS. LYMPHOCYTES 0.6 (L) 0.8 - 3.5 K/UL    ABS. MONOCYTES 0.7 0.0 - 1.0 K/UL    ABS. EOSINOPHILS 0.2 0.0 - 0.4 K/UL    ABS. BASOPHILS 0.1 0.0 - 0.1 K/UL    ABS. IMM.  GRANS. 0.0 0.00 - 0.04 K/UL    DF SMEAR SCANNED      RBC COMMENTS HYPOCHROMIA  1+       METABOLIC PANEL, BASIC    Collection Time: 09/16/19  3:14 AM   Result Value Ref Range    Sodium 140 136 - 145 mmol/L    Potassium 4.4 3.5 - 5.1 mmol/L    Chloride 106 97 - 108 mmol/L    CO2 24 21 - 32 mmol/L    Anion gap 10 5 - 15 mmol/L    Glucose 80 65 - 100 mg/dL    BUN 7 6 - 20 MG/DL    Creatinine 0.62 0.55 - 1.02 MG/DL    BUN/Creatinine ratio 11 (L) 12 - 20      GFR est AA >60 >60 ml/min/1.73m2    GFR est non-AA >60 >60 ml/min/1.73m2    Calcium 8.5 8.5 - 10.1 MG/DL   TYPE & SCREEN    Collection Time: 09/16/19  5:46 PM   Result Value Ref Range    Crossmatch Expiration 09/19/2019     ABO/Rh(D) A POSITIVE     Antibody screen NEG     Unit number [id number]     Blood component type  LR     Unit division 00     Status of unit TRANSFUSED     Crossmatch result Compatible    CULTURE, WOUND W GRAM STAIN    Collection Time: 09/16/19  6:50 PM   Result Value Ref Range    Special Requests: ILEOCLIC ABSCESS, LOOK FPR ANAEROBES     GRAM STAIN RARE WBCS SEEN      GRAM STAIN NO ORGANISMS SEEN      Culture result: NO GROWTH 4 DAYS     CBC W/O DIFF    Collection Time: 09/17/19  2:15 AM   Result Value Ref Range    WBC 8.4 3.6 - 11.0 K/uL    RBC 3.66 (L) 3.80 - 5.20 M/uL    HGB 9.5 (L) 11.5 - 16.0 g/dL    HCT 30.3 (L) 35.0 - 47.0 %    MCV 82.8 80.0 - 99.0 FL    MCH 26.0 26.0 - 34.0 PG    MCHC 31.4 30.0 - 36.5 g/dL    RDW 12.5 11.5 - 14.5 %    PLATELET 729 872 - 569 K/uL    MPV 9.4 8.9 - 12.9 FL    NRBC 0.0 0  WBC    ABSOLUTE NRBC 0.00 0.00 - 8.86 K/uL   METABOLIC PANEL, BASIC    Collection Time: 09/17/19  2:15 AM   Result Value Ref Range    Sodium 139 136 - 145 mmol/L Potassium 3.8 3.5 - 5.1 mmol/L    Chloride 104 97 - 108 mmol/L    CO2 27 21 - 32 mmol/L    Anion gap 8 5 - 15 mmol/L    Glucose 157 (H) 65 - 100 mg/dL    BUN 8 6 - 20 MG/DL    Creatinine 0.72 0.55 - 1.02 MG/DL    BUN/Creatinine ratio 11 (L) 12 - 20      GFR est AA >60 >60 ml/min/1.73m2    GFR est non-AA >60 >60 ml/min/1.73m2    Calcium 8.7 8.5 - 10.1 MG/DL   MAGNESIUM    Collection Time: 09/17/19  2:15 AM   Result Value Ref Range    Magnesium 2.4 1.6 - 2.4 mg/dL   PHOSPHORUS    Collection Time: 09/17/19  2:15 AM   Result Value Ref Range    Phosphorus 3.2 2.6 - 4.7 MG/DL   CBC WITH AUTOMATED DIFF    Collection Time: 09/18/19  6:12 AM   Result Value Ref Range    WBC 5.2 3.6 - 11.0 K/uL    RBC 2.82 (L) 3.80 - 5.20 M/uL    HGB 7.1 (L) 11.5 - 16.0 g/dL    HCT 24.0 (L) 35.0 - 47.0 %    MCV 85.1 80.0 - 99.0 FL    MCH 25.2 (L) 26.0 - 34.0 PG    MCHC 29.6 (L) 30.0 - 36.5 g/dL    RDW 12.9 11.5 - 14.5 %    PLATELET 465 240 - 420 K/uL    MPV 9.5 8.9 - 12.9 FL    NRBC 0.0 0  WBC    ABSOLUTE NRBC 0.00 0.00 - 0.01 K/uL    NEUTROPHILS 90 (H) 32 - 75 %    LYMPHOCYTES 3 (L) 12 - 49 %    MONOCYTES 5 5 - 13 %    EOSINOPHILS 1 0 - 7 %    BASOPHILS 0 0 - 1 %    MYELOCYTES 1 (H) 0 %    IMMATURE GRANULOCYTES 0 %    ABS. NEUTROPHILS 4.7 1.8 - 8.0 K/UL    ABS. LYMPHOCYTES 0.2 (L) 0.8 - 3.5 K/UL    ABS. MONOCYTES 0.3 0.0 - 1.0 K/UL    ABS. EOSINOPHILS 0.1 0.0 - 0.4 K/UL    ABS. BASOPHILS 0.0 0.0 - 0.1 K/UL    ABS. IMM.  GRANS. 0.0 K/UL    DF MANUAL      PLATELET COMMENTS Large Platelets      RBC COMMENTS NORMOCYTIC, NORMOCHROMIC     METABOLIC PANEL, BASIC    Collection Time: 09/18/19  6:12 AM   Result Value Ref Range    Sodium 141 136 - 145 mmol/L    Potassium 4.0 3.5 - 5.1 mmol/L    Chloride 109 (H) 97 - 108 mmol/L    CO2 27 21 - 32 mmol/L    Anion gap 5 5 - 15 mmol/L    Glucose 83 65 - 100 mg/dL    BUN 6 6 - 20 MG/DL    Creatinine 0.47 (L) 0.55 - 1.02 MG/DL    BUN/Creatinine ratio 13 12 - 20      GFR est AA >60 >60 ml/min/1.73m2 GFR est non-AA >60 >60 ml/min/1.73m2    Calcium 7.9 (L) 8.5 - 10.1 MG/DL   CBC WITH AUTOMATED DIFF    Collection Time: 09/19/19  6:25 AM   Result Value Ref Range    WBC 3.1 (L) 3.6 - 11.0 K/uL    RBC 2.43 (L) 3.80 - 5.20 M/uL    HGB 6.3 (L) 11.5 - 16.0 g/dL    HCT 21.0 (L) 35.0 - 47.0 %    MCV 86.4 80.0 - 99.0 FL    MCH 25.9 (L) 26.0 - 34.0 PG    MCHC 30.0 30.0 - 36.5 g/dL    RDW 12.8 11.5 - 14.5 %    PLATELET 780 593 - 275 K/uL    MPV 9.8 8.9 - 12.9 FL    NRBC 0.0 0  WBC    ABSOLUTE NRBC 0.00 0.00 - 0.01 K/uL    NEUTROPHILS 76 (H) 32 - 75 %    BAND NEUTROPHILS 1 0 - 6 %    LYMPHOCYTES 16 12 - 49 %    MONOCYTES 3 (L) 5 - 13 %    EOSINOPHILS 3 0 - 7 %    BASOPHILS 0 0 - 1 %    METAMYELOCYTES 1 (H) 0 %    IMMATURE GRANULOCYTES 0 %    ABS. NEUTROPHILS 2.4 1.8 - 8.0 K/UL    ABS. LYMPHOCYTES 0.5 (L) 0.8 - 3.5 K/UL    ABS. MONOCYTES 0.1 0.0 - 1.0 K/UL    ABS. EOSINOPHILS 0.1 0.0 - 0.4 K/UL    ABS. BASOPHILS 0.0 0.0 - 0.1 K/UL    ABS. IMM.  GRANS. 0.0 K/UL    DF MANUAL      RBC COMMENTS HYPOCHROMIA  2+       METABOLIC PANEL, BASIC    Collection Time: 09/19/19  6:25 AM   Result Value Ref Range    Sodium 142 136 - 145 mmol/L    Potassium 4.0 3.5 - 5.1 mmol/L    Chloride 107 97 - 108 mmol/L    CO2 30 21 - 32 mmol/L    Anion gap 5 5 - 15 mmol/L    Glucose 79 65 - 100 mg/dL    BUN 9 6 - 20 MG/DL    Creatinine 0.55 0.55 - 1.02 MG/DL    BUN/Creatinine ratio 16 12 - 20      GFR est AA >60 >60 ml/min/1.73m2    GFR est non-AA >60 >60 ml/min/1.73m2    Calcium 8.3 (L) 8.5 - 10.1 MG/DL   HGB & HCT    Collection Time: 09/19/19  7:55 PM   Result Value Ref Range    HGB 7.2 (L) 11.5 - 16.0 g/dL    HCT 23.4 (L) 35.0 - 47.0 %   CBC WITH AUTOMATED DIFF    Collection Time: 09/20/19  3:45 AM   Result Value Ref Range    WBC 4.9 3.6 - 11.0 K/uL    RBC 2.80 (L) 3.80 - 5.20 M/uL    HGB 7.3 (L) 11.5 - 16.0 g/dL    HCT 23.8 (L) 35.0 - 47.0 %    MCV 85.0 80.0 - 99.0 FL    MCH 26.1 26.0 - 34.0 PG    MCHC 30.7 30.0 - 36.5 g/dL    RDW 13.0 11.5 - 14.5 %    PLATELET 639 865 - 808 K/uL    MPV 10.2 8.9 - 12.9 FL    NRBC 0.0 0  WBC    ABSOLUTE NRBC 0.00 0.00 - 0.01 K/uL    NEUTROPHILS 81 (H) 32 - 75 %    BAND NEUTROPHILS 3 0 - 6 %    LYMPHOCYTES 9 (L) 12 - 49 %    MONOCYTES 5 5 - 13 %    EOSINOPHILS 1 0 - 7 %    BASOPHILS 1 0 - 1 %    IMMATURE GRANULOCYTES 0 %    ABS. NEUTROPHILS 4.1 1.8 - 8.0 K/UL    ABS. LYMPHOCYTES 0.4 (L) 0.8 - 3.5 K/UL    ABS. MONOCYTES 0.2 0.0 - 1.0 K/UL    ABS. EOSINOPHILS 0.1 0.0 - 0.4 K/UL    ABS. BASOPHILS 0.1 0.0 - 0.1 K/UL    ABS. IMM. GRANS. 0.0 K/UL    DF MANUAL      PLATELET COMMENTS Large Platelets      RBC COMMENTS ANISOCYTOSIS  1+       METABOLIC PANEL, BASIC    Collection Time: 09/20/19  3:45 AM   Result Value Ref Range    Sodium 138 136 - 145 mmol/L    Potassium 4.0 3.5 - 5.1 mmol/L    Chloride 104 97 - 108 mmol/L    CO2 29 21 - 32 mmol/L    Anion gap 5 5 - 15 mmol/L    Glucose 81 65 - 100 mg/dL    BUN 6 6 - 20 MG/DL    Creatinine 0.50 (L) 0.55 - 1.02 MG/DL    BUN/Creatinine ratio 12 12 - 20      GFR est AA >60 >60 ml/min/1.73m2    GFR est non-AA >60 >60 ml/min/1.73m2    Calcium 8.5 8.5 - 10.1 MG/DL   CBC WITH AUTOMATED DIFF    Collection Time: 09/21/19  3:00 AM   Result Value Ref Range    WBC 5.6 3.6 - 11.0 K/uL    RBC 2.88 (L) 3.80 - 5.20 M/uL    HGB 7.6 (L) 11.5 - 16.0 g/dL    HCT 24.2 (L) 35.0 - 47.0 %    MCV 84.0 80.0 - 99.0 FL    MCH 26.4 26.0 - 34.0 PG    MCHC 31.4 30.0 - 36.5 g/dL    RDW 13.2 11.5 - 14.5 %    PLATELET 142 054 - 120 K/uL    MPV 9.7 8.9 - 12.9 FL    NRBC 0.0 0  WBC    ABSOLUTE NRBC 0.00 0.00 - 0.01 K/uL    NEUTROPHILS 76 (H) 32 - 75 %    BAND NEUTROPHILS 2 0 - 6 %    LYMPHOCYTES 10 (L) 12 - 49 %    MONOCYTES 9 5 - 13 %    EOSINOPHILS 3 0 - 7 %    BASOPHILS 0 0 - 1 %    IMMATURE GRANULOCYTES 0 %    ABS. NEUTROPHILS 4.3 1.8 - 8.0 K/UL    ABS. LYMPHOCYTES 0.6 (L) 0.8 - 3.5 K/UL    ABS. MONOCYTES 0.5 0.0 - 1.0 K/UL    ABS. EOSINOPHILS 0.2 0.0 - 0.4 K/UL    ABS.  BASOPHILS 0.0 0.0 - 0.1 K/UL ABS. IMM. GRANS. 0.0 K/UL    DF MANUAL      PLATELET COMMENTS Large Platelets      RBC COMMENTS ANISOCYTOSIS  1+       METABOLIC PANEL, BASIC    Collection Time: 09/21/19  3:00 AM   Result Value Ref Range    Sodium 135 (L) 136 - 145 mmol/L    Potassium 3.5 3.5 - 5.1 mmol/L    Chloride 101 97 - 108 mmol/L    CO2 29 21 - 32 mmol/L    Anion gap 5 5 - 15 mmol/L    Glucose 89 65 - 100 mg/dL    BUN 6 6 - 20 MG/DL    Creatinine 0.48 (L) 0.55 - 1.02 MG/DL    BUN/Creatinine ratio 13 12 - 20      GFR est AA >60 >60 ml/min/1.73m2    GFR est non-AA >60 >60 ml/min/1.73m2    Calcium 8.6 8.5 - 10.1 MG/DL           Physical Exam on Discharge:    Discharge condition: fair    Vital signs:   Patient Vitals for the past 12 hrs:   Temp Pulse Resp BP SpO2   09/21/19 0813 98.3 °F (36.8 °C) 79 16 96/62 96 %   09/21/19 0002 98.4 °F (36.9 °C) 94 16 109/71 99 %       General appearance: alert, cooperative, no distress, appears stated age  Lungs: clear to auscultation bilaterally    Current Discharge Medication List      START taking these medications    Details   oxyCODONE IR (ROXICODONE) 5 mg immediate release tablet Take 1 Tab by mouth every four (4) hours as needed for Pain for up to 3 days. Max Daily Amount: 30 mg.  Qty: 20 Tab, Refills: 0    Associated Diagnoses: Intra-abdominal abscess ([address] Abscess of abdominal cavity (HCC)      ondansetron (ZOFRAN ODT) 4 mg disintegrating tablet Take 1 Tab by mouth every eight (8) hours as needed for Nausea. Qty: 15 Tab, Refills: 0         CONTINUE these medications which have CHANGED    Details   ferrous sulfate 325 mg (65 mg iron) tablet Take 1 Tab by mouth three (3) times daily (with meals). Qty: 90 Tab, Refills: 0    Associated Diagnoses: Plantar wart of right foot         CONTINUE these medications which have NOT CHANGED    Details   escitalopram oxalate (LEXAPRO) 20 mg tablet Take 1 Tab by mouth daily.   Qty: 30 Tab, Refills: 3    Associated Diagnoses: Anxiety      levonorgestrel (MIRENA) 20 mcg/24 hr (5 years) IUD 1 Device by IntraUTERine route once. adalimumab (HUMIRA PEN) 40 mg/0.8 mL injection pen by SubCUTAneous route once. Every other week      triamcinolone acetonide (KENALOG) 0.5 % ointment Apply  to affected area two (2) times a day. use thin layer  Qty: 30 g, Refills: 0    Associated Diagnoses: Psoriasis      omeprazole (PRILOSEC) 40 mg capsule Take 40 mg by mouth daily. Associated Diagnoses: Plantar wart of right foot      APRISO 0.375 gram capsule Take 1.5 g by mouth daily.     Associated Diagnoses: Plantar wart of right foot               Total time spent on discharge planning, counseling and co-ordination of care:   33 minutes    Marsha Belcher MD  09/21/19  10:16 AM

## 2019-09-21 NOTE — PROGRESS NOTES
Infectious Diseases Progress Note    Antibiotic Summary:  Zosyn   -- present    Robotic ileocolic resection and drainage of abscess on 2019    Subjective:     She was more active today. Ambulated more. Eating more. No new symptoms. ROS:  Constitutional: no fever or chills  HEENT: no HA or sore throat  Skin: no rash  Resp: no cough  CV: no CP  GI: abdominal pain is better; loose stools  Neuro: no symptoms    Objective:     Vitals:   Visit Vitals  /71   Pulse 86   Temp 97.7 °F (36.5 °C)   Resp 16   Ht 5' 5\" (1.651 m)   Wt 68.4 kg (150 lb 11.2 oz)   LMP 2019   SpO2 96%   Breastfeeding? No   BMI 25.08 kg/m²        Tmax:  Temp (24hrs), Av.1 °F (36.7 °C), Min:97.7 °F (36.5 °C), Max:98.2 °F (36.8 °C)      Exam:  General appearance: alert, no distress  HEENT: normal oropharynx  Lungs: clear to auscultation bilaterally  Heart: regular rate and rhythm  Abdomen: soft, not distended, mildly tender; good BS  Skin: no rash  Neuro: A&O    IV Lines: peripheral    Labs:    Recent Labs     19  0345 19  1955 19  0625 19  0612   WBC 4.9  --  3.1* 5.2   HGB 7.3* 7.2* 6.3* 7.1*     --  227 246   BUN 6  --  9 6   CREA 0.50*  --  0.55 0.47*     Intraop culture of RLQ abscess on 2019:   Gram stain = rare WBC; NOS   Culture = NGSF    Assessment:     1. Active Crohns with evolving fistulae and abdominopelvic abscess -- S/P robotic ileocolic resection and drainage of abscess on : stable postop; improving and intraop cultures have been negative, no fever, normal WBC    Plan:     1. Continue Zosyn     2. Can D/C Zosyn in AM    3.  OK to go home  off antibiotics      Call if problems arise    Johanna Morris MD

## 2019-09-21 NOTE — PROGRESS NOTES
Problem: Patient Education:  Go to Education Activity  Goal: Patient/Family Education  Outcome: Progressing Towards Goal     Problem: Surgical Pathway Post-Op Day 2 through Discharge  Goal: Off Pathway (Use only if patient is Off Pathway)  Outcome: Progressing Towards Goal  Goal: Activity/Safety  Outcome: Progressing Towards Goal  Goal: Medications  Outcome: Progressing Towards Goal  Goal: *No signs and symptoms of infection or wound complications  Outcome: Progressing Towards Goal  Goal: *Optimal pain control at patient's stated goal  Outcome: Progressing Towards Goal  Goal: *Lungs clear or at baseline  Outcome: Progressing Towards Goal

## 2019-09-21 NOTE — ROUTINE PROCESS
Bedside and Verbal shift change report given to [facility] (oncoming nurse) by Ulysses Martin (offgoing nurse). Report included the following information SBAR, Kardex and Recent Results.

## 2019-09-21 NOTE — DISCHARGE INSTRUCTIONS
Discharge Instructions       PATIENT ID: Harpal Cash  MRN: [id number]   YOB: 1995    DATE OF ADMISSION: 9/8/2019  6:14 PM    DATE OF DISCHARGE: 9/21/2019    PRIMARY CARE PROVIDER: Shazia Sheikh MD     ATTENDING PHYSICIAN: Chance Rashid MD  DISCHARGING PROVIDER: Yas Doherty MD    To contact this individual call [phone] and ask the  to page. If unavailable ask to be transferred the Adult Hospitalist Department.     DISCHARGE DIAGNOSES   Sepsis with intra-abdominal abscess - resolved   Secondary to Crohn's disease    - CT abd: reviewed  - IR consulted for abscess drainage but it was not done due ot possibility of adhesion  - s/p ileocolic resection 2/57, biopsy shows abscess  - stool cx positive for P. 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  S/p Laparoscopic Robotic ileocolic resection and repair for serosal tear 9/16/19     Large right ovarian cyst  DW patient, she will need to FU with Ob/GYN, and will need a pelvic US likely as OP     Acute blood loss anemia sec to clay-operative blood loss  s/p 1 unit PRBC 9/19, repeat HH stable  Cont Iron Supplementation      CONSULTATIONS: IP CONSULT TO COLORECTAL SURGERY  IP CONSULT TO GENERAL SURGERY  IP CONSULT TO GASTROENTEROLOGY  IP CONSULT TO INFECTIOUS DISEASES  IP CONSULT TO HOSPITALIST    PROCEDURES/SURGERIES: Procedure(s):  ROBOTIC ILEOCOLIC RESECTION WITH REPAIR OF SEROSAL TEAR    PENDING TEST RESULTS:   At the time of discharge the following test results are still pending: n/a    FOLLOW UP APPOINTMENTS:   Follow-up Information     Follow up With Specialties Details Why Contact Info    Will Acharya MD Family Practice Schedule an appointment as soon as possible for a visit for post-hospitalization follow up, with in 7 days, for follow Up [id number] [address] [address] [place] [address]  [phone]      Alex Lopez MD Colon and Rectal Surgery Schedule an appointment as soon as possible for a visit as recommended Stefanie Aden [address] [facility]      Carmen Krause MD Gastroenterology Schedule an appointment as soon as possible for a visit for follow Up [address]  [phone]             ADDITIONAL CARE RECOMMENDATIONS:   · It is important that you take the medication exactly as they are prescribed. · Keep your medication in the bottles provided by the pharmacist and keep a list of the medication names, dosages, and times to be taken in your wallet. · Do not take other medications without consulting your doctor. · No drinking alcohol or driving car or operating machinery if you are on narcotic pain medications. Donot take sedating mediations if you are sleepy or confused. · Fall Precautions  · Keep Well Hydrated  · Report to your medical provider if you feel you have  developed allergies to medications  · Follow up with your PCP or Consultant for medication adjustments and refills  · Monitor for signs of fevers,chills,bleeding,chest pain and seek medical attention if you do so. PLEASE FOLLOW UP WITH YOUR OB/GYN REGARDING OVARIAN CYST. DIET: DIET REGULAR    Oral Nutritional Supplements: Ensure Complete or Ensure Plus Twice daily     ACTIVITY: Ambulate in house    WOUND CARE: as per surgical recs    EQUIPMENT needed: n/a      DISCHARGE MEDICATIONS:   See Medication Reconciliation Form    · It is important that you take the medication exactly as they are prescribed. · Keep your medication in the bottles provided by the pharmacist and keep a list of the medication names, dosages, and times to be taken in your wallet. · Do not take other medications without consulting your doctor.        NOTIFY [facility] ANY OF THE FOLLOWING:   Fever over 101 degrees for 24 hours. Chest pain, shortness of breath, fever, chills, nausea, vomiting, diarrhea, change in mentation, falling, weakness, bleeding. Severe pain or pain not relieved by medications. Or, any other signs or symptoms that you may have questions about. DISPOSITION:  x  Home With:   OT  PT  HH  RN       SNF/Inpatient Rehab/LTAC    Independent/assisted living    Hospice    Other:       Information obtained by :   I understand that if any problems occur once I am at home I am to contact my physician. I understand and acknowledge receipt of the instructions indicated above.                                                                                                                                              [de-identified] or R.N.'s Signature                                                                  Date/Time                                                                                                                                              Patient or Representative Signature                                                          Date/Time          Signed:   Quinn Thompson MD  9/21/2019  10:13 AM

## 2019-09-23 ENCOUNTER — PATIENT OUTREACH (OUTPATIENT)
Dept: PRIMARY CARE CLINIC | Age: 24
End: 2019-09-23

## 2019-09-23 NOTE — Clinical Note
CAMILA. She was in the hospital. I tried to reach her today but was unable. I will try again tomorrow.  Thanks [address]

## 2019-09-23 NOTE — PROGRESS NOTES
Noted on daily discharge report. EMR reviewed. NN attempted to reach for hospital follow up . NN unable to reach and unable to leave VM as VM box is full. NN will attempt to reach for follow up again tomorrow.

## 2019-09-24 ENCOUNTER — PATIENT OUTREACH (OUTPATIENT)
Dept: PRIMARY CARE CLINIC | Age: 24
End: 2019-09-24

## 2019-09-24 NOTE — PROGRESS NOTES
NN's second attempt to reach patient for follow up post hospital discharge. NN left VM and provided NN's contact info for return call.

## 2019-10-18 ENCOUNTER — PATIENT OUTREACH (OUTPATIENT)
Dept: PRIMARY CARE CLINIC | Age: 24
End: 2019-10-18

## 2019-10-18 NOTE — PROGRESS NOTES
NN was not able to reach patient post hospital d/c for follow up . Patient did not return calls. Did not follow up with PCP. Has not had any hospital readmissions or ER visits since d/c on 9/21/19. NN will now close this NN episode due to inability to reach patient.

## 2019-11-18 ENCOUNTER — OFFICE VISIT (OUTPATIENT)
Dept: PRIMARY CARE CLINIC | Age: 24
End: 2019-11-18

## 2019-11-18 VITALS
HEART RATE: 88 BPM | HEIGHT: 65 IN | SYSTOLIC BLOOD PRESSURE: 118 MMHG | RESPIRATION RATE: 16 BRPM | TEMPERATURE: 98.5 F | DIASTOLIC BLOOD PRESSURE: 65 MMHG | WEIGHT: 155 LBS | OXYGEN SATURATION: 100 % | BODY MASS INDEX: 25.83 KG/M2

## 2019-11-18 DIAGNOSIS — F41.9 ANXIETY: Primary | ICD-10-CM

## 2019-11-18 DIAGNOSIS — K50.014 CROHN'S DISEASE OF SMALL INTESTINE WITH ABSCESS (HCC): ICD-10-CM

## 2019-11-18 RX ORDER — BUSPIRONE HYDROCHLORIDE 7.5 MG/1
7.5 TABLET ORAL 3 TIMES DAILY
Qty: 90 TAB | Refills: 3 | Status: SHIPPED | OUTPATIENT
Start: 2019-11-18 | End: 2020-01-21

## 2019-11-18 RX ORDER — ESCITALOPRAM OXALATE 20 MG/1
TABLET ORAL
Qty: 30 TAB | Refills: 3 | Status: SHIPPED | OUTPATIENT
Start: 2019-11-18 | End: 2020-06-08

## 2019-11-18 NOTE — PROGRESS NOTES
Chief Complaint   Patient presents with    Anxiety     3 month follow up      1. Have you been to the ER, urgent care clinic since your last visit? Hospitalized since your last visit? No    2. Have you seen or consulted any other health care providers outside of the [address] [place] [address] since your last visit? Include any pap smears or colon screening.  No

## 2019-11-18 NOTE — PROGRESS NOTES
Subjective:     Chief Complaint   Patient presents with    Anxiety     3 month follow up     Medication Refill        She  is a 26y.o. year old female with hx of Crohn's d/s followed by ileocolic resection, anxiety is here for follow up on anxiety. Patient reports that for the most part Lexapro has been working well but recently due to her health condition her anxiety level is worsened. Reports that she is working as a  and sometimes she has to take off early due to anxiety episodes. She graduated from college, taking a break now and planning to go to law school soon. Denies any depression, SI, HI. Denies any Abd pain, blood in stool. Has been following up with GI regularly. Pertinent items are noted in HPI.   Objective:     Vitals:    11/18/19 1024   BP: 118/65   Pulse: 88   Resp: 16   Temp: 98.5 °F (36.9 °C)   TempSrc: Oral   SpO2: 100%   Weight: 155 lb (70.3 kg)   Height: 5' 5\" (1.651 m)       Physical Examination: General appearance - alert, well appearing, and in no distress, oriented to person, place, and time and overweight  Mental status - alert, oriented to person, place, and time, normal mood, behavior, speech, dress, motor activity, and thought processes  Chest - clear to auscultation, no wheezes, rales or rhonchi, symmetric air entry  Heart - normal rate, regular rhythm, normal S1, S2, no murmurs, rubs, clicks or gallops    No Known Allergies   Social History     Socioeconomic History    Marital status: SINGLE     Spouse name: Not on file    Number of children: Not on file    Years of education: Not on file    Highest education level: Not on file   Tobacco Use    Smoking status: Never Smoker    Smokeless tobacco: Never Used   Substance and Sexual Activity    Alcohol use: No    Drug use: No    Sexual activity: Never   Other Topics Concern      Family History   Problem Relation Age of Onset    Thyroid Disease Mother         hypothyroidism      Past Surgical History: Procedure Laterality Date    HX COLONOSCOPY      HX PREMALIG/BENIGN SKIN LESION EXCISION        Past Medical History:   Diagnosis Date    Anemia     Crohn's disease ([facility]       Current Outpatient Medications   Medication Sig Dispense Refill    escitalopram oxalate (LEXAPRO) 20 mg tablet TAKE 1 TABLET BY MOUTH DAILY 30 Tab 1    levonorgestrel (MIRENA) 20 mcg/24 hr (5 years) IUD 1 Device by IntraUTERine route once.  triamcinolone acetonide (KENALOG) 0.5 % ointment Apply  to affected area two (2) times a day. use thin layer 30 g 0        Assessment/ Plan:   Diagnoses and all orders for this visit:    1. Anxiety  -     Continue escitalopram oxalate (LEXAPRO) 20 mg tablet; TAKE 1 TABLET BY MOUTH DAILY  -    Start  busPIRone (BUSPAR) 7.5 mg tablet; Take 1 Tab by mouth three (3) times daily. 2. Cro[facility]'s disease of small intestine with abscess ([facility]       - Stable currently. Continue follow up with GI. Medication risks/benefits/costs/interactions/alternatives discussed with patient. Advised patient to call back or return to office if symptoms worsen/change/persist. If patient cannot reach us or should anything more severe/urgent arise he/she should proceed directly to the nearest emergency department. Discussed expected course/resolution/complications of diagnosis in detail with patient. Patient given a written after visit summary which includes her diagnoses, current medications and vitals. Patient expressed understanding with the diagnosis and plan. Follow-up and Dispositions    · Return in about 2 months (around 1/18/2020) for anxiety.

## 2020-01-21 ENCOUNTER — OFFICE VISIT (OUTPATIENT)
Dept: PRIMARY CARE CLINIC | Age: 25
End: 2020-01-21

## 2020-01-21 VITALS
OXYGEN SATURATION: 100 % | SYSTOLIC BLOOD PRESSURE: 113 MMHG | WEIGHT: 159 LBS | RESPIRATION RATE: 17 BRPM | DIASTOLIC BLOOD PRESSURE: 67 MMHG | HEART RATE: 72 BPM | TEMPERATURE: 97.6 F | HEIGHT: 65 IN | BODY MASS INDEX: 26.49 KG/M2

## 2020-01-21 DIAGNOSIS — F41.9 ANXIETY: Primary | ICD-10-CM

## 2020-01-21 DIAGNOSIS — L40.9 PSORIASIS: ICD-10-CM

## 2020-01-21 RX ORDER — BUSPIRONE HYDROCHLORIDE 10 MG/1
10 TABLET ORAL 3 TIMES DAILY
Qty: 90 TAB | Refills: 2 | Status: SHIPPED | OUTPATIENT
Start: 2020-01-21 | End: 2020-06-10 | Stop reason: SDUPTHER

## 2020-01-21 RX ORDER — TRIAMCINOLONE ACETONIDE 5 MG/G
OINTMENT TOPICAL 2 TIMES DAILY
Qty: 30 G | Refills: 3 | Status: SHIPPED | OUTPATIENT
Start: 2020-01-21 | End: 2022-04-17

## 2020-01-21 NOTE — PROGRESS NOTES
Subjective:     Chief Complaint   Patient presents with    Anxiety     2 month f/u        She  is a 27y.o. year old female with hx of psoriasis, Crohn's d/s followed by ileocolic resection, anxiety is here for follow up on anxiety. Lexapro has been working well . Adding Buspar is helping a lot but she thinks increasing the dose will be helpful. Over all she feels better. Need triamcinolone refill for psoriasis.      She graduated from college, taking a break now and planning to go to law school soon.      Denies any depression, SI, HI. Denies any Abd pain, blood in stool. Has been following up with GI regularly. Pertinent items are noted in HPI. Objective:     Vitals:    01/21/20 1046   BP: 113/67   Pulse: 72   Resp: 17   Temp: 97.6 °F (36.4 °C)   TempSrc: Oral   SpO2: 100%   Weight: 159 lb (72.1 kg)   Height: 5' 5\" (1.651 m)       Physical Examination: General appearance - alert, well appearing, and in no distress, oriented to person, place, and time and overweight  Mental status - alert, oriented to person, place, and time, normal mood, behavior, speech, dress, motor activity, and thought processes  Chest - clear to auscultation, no wheezes, rales or rhonchi, symmetric air entry  Heart - normal rate, regular rhythm, normal S1, S2, no murmurs, rubs, clicks or gallops. Skin: slight redness noted in back of the ear.     No Known Allergies   Social History     Socioeconomic History    Marital status: SINGLE     Spouse name: Not on file    Number of children: Not on file    Years of education: Not on file    Highest education level: Not on file   Tobacco Use    Smoking status: Never Smoker    Smokeless tobacco: Never Used   Substance and Sexual Activity    Alcohol use: No    Drug use: No    Sexual activity: Never   Other Topics Concern      Family History   Problem Relation Age of Onset    Thyroid Disease Mother         hypothyroidism      Past Surgical History:   Procedure Laterality Date    HX COLONOSCOPY      HX PREMALIG/BENIGN SKIN LESION EXCISION        Past Medical History:   Diagnosis Date    Anemia     Crohn's disease (HCC)       Current Outpatient Medications   Medication Sig Dispense Refill    escitalopram oxalate (LEXAPRO) 20 mg tablet TAKE 1 TABLET BY MOUTH DAILY 30 Tab 3    busPIRone (BUSPAR) 7.5 mg tablet Take 1 Tab by mouth three (3) times daily. 90 Tab 3    triamcinolone acetonide (KENALOG) 0.5 % ointment Apply  to affected area two (2) times a day. use thin layer 30 g 0    levonorgestrel (MIRENA) 20 mcg/24 hr (5 years) IUD 1 Device by IntraUTERine route once. Assessment/ Plan:   Diagnoses and all orders for this visit:    1. Anxiety  -    Continue Lexapro and increase  busPIRone (BUSPAR) 10 mg tablet; Take 1 Tab by mouth three (3) times daily. 2. Psoriasis  -     triamcinolone acetonide (KENALOG) 0.5 % ointment; Apply  to affected area two (2) times a day. As needed. Use thin layer           Medication risks/benefits/costs/interactions/alternatives discussed with patient. Advised patient to call back or return to office if symptoms worsen/change/persist. If patient cannot reach us or should anything more severe/urgent arise he/she should proceed directly to the nearest emergency department. Discussed expected course/resolution/complications of diagnosis in detail with patient. Patient given a written after visit summary which includes her diagnoses, current medications and vitals. Patient expressed understanding with the diagnosis and plan. Follow-up and Dispositions    · Return in about 3 months (around 4/21/2020), or if symptoms worsen or fail to improve, for anxiety/depression. Kelli Zuleta

## 2020-05-04 DIAGNOSIS — F41.9 ANXIETY: ICD-10-CM

## 2020-05-07 RX ORDER — BUSPIRONE HYDROCHLORIDE 10 MG/1
TABLET ORAL
Qty: 90 TAB | Refills: 0 | OUTPATIENT
Start: 2020-05-07

## 2020-09-28 ENCOUNTER — TRANSCRIBE ORDER (OUTPATIENT)
Dept: REGISTRATION | Age: 25
End: 2020-09-28

## 2020-09-28 ENCOUNTER — HOSPITAL ENCOUNTER (OUTPATIENT)
Dept: PREADMISSION TESTING | Age: 25
Discharge: HOME | End: 2020-09-28
Payer: COMMERCIAL

## 2020-09-28 DIAGNOSIS — Z01.812 PRE-PROCEDURE LAB EXAM: ICD-10-CM

## 2020-09-28 DIAGNOSIS — Z01.812 PRE-PROCEDURE LAB EXAM: Primary | ICD-10-CM

## 2020-09-28 PROCEDURE — 87635 SARS-COV-2 COVID-19 AMP PRB: CPT

## 2020-09-29 LAB — SARS-COV-2, COV2NT: NOT DETECTED

## 2020-10-02 ENCOUNTER — HOSPITAL ENCOUNTER (OUTPATIENT)
Age: 25
Setting detail: OUTPATIENT SURGERY
Discharge: HOME | End: 2020-10-02
Attending: INTERNAL MEDICINE | Admitting: INTERNAL MEDICINE
Payer: COMMERCIAL

## 2020-10-02 ENCOUNTER — ANESTHESIA (OUTPATIENT)
Dept: ENDOSCOPY | Age: 25
End: 2020-10-02
Payer: COMMERCIAL

## 2020-10-02 ENCOUNTER — ANESTHESIA EVENT (OUTPATIENT)
Dept: ENDOSCOPY | Age: 25
End: 2020-10-02
Payer: COMMERCIAL

## 2020-10-02 VITALS
BODY MASS INDEX: 25.83 KG/M2 | WEIGHT: 155 LBS | OXYGEN SATURATION: 100 % | HEIGHT: 65 IN | SYSTOLIC BLOOD PRESSURE: 106 MMHG | RESPIRATION RATE: 14 BRPM | TEMPERATURE: 98.2 F | DIASTOLIC BLOOD PRESSURE: 72 MMHG | HEART RATE: 67 BPM

## 2020-10-02 LAB — HCG UR QL: NEGATIVE

## 2020-10-02 PROCEDURE — 74011250636 HC RX REV CODE- 250/636: Performed by: NURSE ANESTHETIST, CERTIFIED REGISTERED

## 2020-10-02 PROCEDURE — 76060000031 HC ANESTHESIA FIRST 0.5 HR: Performed by: INTERNAL MEDICINE

## 2020-10-02 PROCEDURE — 77030021593 HC FCPS BIOP ENDOSC BSC -A: Performed by: INTERNAL MEDICINE

## 2020-10-02 PROCEDURE — 74011000250 HC RX REV CODE- 250: Performed by: NURSE ANESTHETIST, CERTIFIED REGISTERED

## 2020-10-02 PROCEDURE — 81025 URINE PREGNANCY TEST: CPT

## 2020-10-02 PROCEDURE — 76040000019: Performed by: INTERNAL MEDICINE

## 2020-10-02 PROCEDURE — 88305 TISSUE EXAM BY PATHOLOGIST: CPT

## 2020-10-02 PROCEDURE — 74011250636 HC RX REV CODE- 250/636: Performed by: INTERNAL MEDICINE

## 2020-10-02 RX ORDER — SODIUM CHLORIDE 9 MG/ML
50 INJECTION, SOLUTION INTRAVENOUS CONTINUOUS
Status: DISCONTINUED | OUTPATIENT
Start: 2020-10-02 | End: 2020-10-02 | Stop reason: HOSPADM

## 2020-10-02 RX ORDER — FENTANYL CITRATE 50 UG/ML
25-200 INJECTION, SOLUTION INTRAMUSCULAR; INTRAVENOUS
Status: DISCONTINUED | OUTPATIENT
Start: 2020-10-02 | End: 2020-10-02 | Stop reason: HOSPADM

## 2020-10-02 RX ORDER — SODIUM CHLORIDE 0.9 % (FLUSH) 0.9 %
5-40 SYRINGE (ML) INJECTION AS NEEDED
Status: DISCONTINUED | OUTPATIENT
Start: 2020-10-02 | End: 2020-10-02 | Stop reason: HOSPADM

## 2020-10-02 RX ORDER — EPINEPHRINE 0.1 MG/ML
1 INJECTION INTRACARDIAC; INTRAVENOUS
Status: DISCONTINUED | OUTPATIENT
Start: 2020-10-02 | End: 2020-10-02 | Stop reason: HOSPADM

## 2020-10-02 RX ORDER — SODIUM CHLORIDE 0.9 % (FLUSH) 0.9 %
5-40 SYRINGE (ML) INJECTION EVERY 8 HOURS
Status: DISCONTINUED | OUTPATIENT
Start: 2020-10-02 | End: 2020-10-02 | Stop reason: HOSPADM

## 2020-10-02 RX ORDER — LIDOCAINE HYDROCHLORIDE 20 MG/ML
INJECTION, SOLUTION EPIDURAL; INFILTRATION; INTRACAUDAL; PERINEURAL AS NEEDED
Status: DISCONTINUED | OUTPATIENT
Start: 2020-10-02 | End: 2020-10-02 | Stop reason: HOSPADM

## 2020-10-02 RX ORDER — MIDAZOLAM HYDROCHLORIDE 1 MG/ML
.25-5 INJECTION, SOLUTION INTRAMUSCULAR; INTRAVENOUS
Status: DISCONTINUED | OUTPATIENT
Start: 2020-10-02 | End: 2020-10-02 | Stop reason: HOSPADM

## 2020-10-02 RX ORDER — ATROPINE SULFATE 0.1 MG/ML
0.5 INJECTION INTRAVENOUS
Status: DISCONTINUED | OUTPATIENT
Start: 2020-10-02 | End: 2020-10-02 | Stop reason: HOSPADM

## 2020-10-02 RX ORDER — NALOXONE HYDROCHLORIDE 0.4 MG/ML
0.4 INJECTION, SOLUTION INTRAMUSCULAR; INTRAVENOUS; SUBCUTANEOUS
Status: DISCONTINUED | OUTPATIENT
Start: 2020-10-02 | End: 2020-10-02 | Stop reason: HOSPADM

## 2020-10-02 RX ORDER — PROPOFOL 10 MG/ML
INJECTION, EMULSION INTRAVENOUS AS NEEDED
Status: DISCONTINUED | OUTPATIENT
Start: 2020-10-02 | End: 2020-10-02 | Stop reason: HOSPADM

## 2020-10-02 RX ORDER — FLUMAZENIL 0.1 MG/ML
0.2 INJECTION INTRAVENOUS
Status: DISCONTINUED | OUTPATIENT
Start: 2020-10-02 | End: 2020-10-02 | Stop reason: HOSPADM

## 2020-10-02 RX ORDER — MESALAMINE 0.38 G/1
1.5 CAPSULE, EXTENDED RELEASE ORAL DAILY
COMMUNITY
End: 2022-04-17

## 2020-10-02 RX ORDER — DEXTROMETHORPHAN/PSEUDOEPHED 2.5-7.5/.8
1.2 DROPS ORAL
Status: DISCONTINUED | OUTPATIENT
Start: 2020-10-02 | End: 2020-10-02 | Stop reason: HOSPADM

## 2020-10-02 RX ADMIN — PROPOFOL 40 MG: 10 INJECTION, EMULSION INTRAVENOUS at 13:59

## 2020-10-02 RX ADMIN — PROPOFOL 40 MG: 10 INJECTION, EMULSION INTRAVENOUS at 13:56

## 2020-10-02 RX ADMIN — PROPOFOL 50 MG: 10 INJECTION, EMULSION INTRAVENOUS at 13:47

## 2020-10-02 RX ADMIN — PROPOFOL 40 MG: 10 INJECTION, EMULSION INTRAVENOUS at 13:53

## 2020-10-02 RX ADMIN — LIDOCAINE HYDROCHLORIDE 50 MG: 20 INJECTION, SOLUTION EPIDURAL; INFILTRATION; INTRACAUDAL; PERINEURAL at 13:46

## 2020-10-02 RX ADMIN — PROPOFOL 50 MG: 10 INJECTION, EMULSION INTRAVENOUS at 13:48

## 2020-10-02 RX ADMIN — SODIUM CHLORIDE: 900 INJECTION, SOLUTION INTRAVENOUS at 13:37

## 2020-10-02 RX ADMIN — PROPOFOL 40 MG: 10 INJECTION, EMULSION INTRAVENOUS at 13:50

## 2020-10-02 RX ADMIN — PROPOFOL 100 MG: 10 INJECTION, EMULSION INTRAVENOUS at 13:46

## 2020-10-02 NOTE — H&P
[address]      History and Physical       NAME:  Izzy Gamez   :   1995   MRN:   [id number]             History of Present Illness:  Patient is a 27 y.o. who is seen for anemia and Crohn's disease. PMH:  Past Medical History:   Diagnosis Date    Anemia     Crohn's disease ([address]        PSH:  Past Surgical History:   Procedure Laterality Date    HX COLONOSCOPY      HX PREMALIG/BENIGN SKIN LESION EXCISION         Allergies:  No Known Allergies    Home Medications:  Prior to Admission Medications   Prescriptions Last Dose Informant Patient Reported? Taking?   busPIRone (BUSPAR) 10 mg tablet   No No   Sig: TAKE 1 TABLET BY MOUTH THREE TIMES DAILY   escitalopram oxalate (LEXAPRO) 20 mg tablet   No No   Sig: TAKE 1 TABLET BY MOUTH DAILY   levonorgestrel (MIRENA) 20 mcg/24 hr (5 years) IUD   Yes No   Si Device by IntraUTERine route once. triamcinolone acetonide (KENALOG) 0.5 % ointment   No No   Sig: Apply  to affected area two (2) times a day. As needed. Use thin layer      Facility-Administered Medications: None       Hospital Medications:  No current facility-administered medications for this encounter.         Social History:  Social History     Tobacco Use    Smoking status: Never Smoker    Smokeless tobacco: Never Used   Substance Use Topics    Alcohol use: No       Family History:  Family History   Problem Relation Age of Onset    Thyroid Disease Mother         hypothyroidism             Review of Systems:      Constitutional: negative fever, negative chills, negative weight loss  Eyes:   negative visual changes  ENT:   negative sore throat, tongue or lip swelling  Respiratory:  negative cough, negative dyspnea  Cards:  negative for chest pain, palpitations, lower extremity edema  GI:   See HPI  :  negative for frequency, dysuria  Integument:  negative for rash and pruritus  Heme:  negative for easy bruising and gum/nose bleeding  Musculoskel: negative for myalgias,  back pain and muscle weakness  Neuro: negative for headaches, dizziness, vertigo  Psych:  negative for feelings of anxiety, depression       Objective:   No data found. No intake/output data recorded. No intake/output data recorded. EXAM:     NEURO-a&o   HEENT-wnl   LUNGS-clear    COR-regular rate and rhythym     ABD-soft , no tenderness, no rebound, good bs     EXT-no edema     Data Review     No results for input(s): WBC, HGB, HCT, PLT, HGBEXT, HCTEXT, PLTEXT in the last 72 hours. No results for input(s): NA, K, CL, CO2, BUN, CREA, GLU, PHOS, CA in the last 72 hours. No results for input(s): AP, TBIL, TP, ALB, GLOB, GGT, AML, LPSE in the last 72 hours. No lab exists for component: SGOT, GPT, AMYP, HLPSE  No results for input(s): INR, PTP, APTT, INREXT in the last 72 hours. Assessment:     · Crohn's disease     Patient Active Problem List   Diagnosis Code    [facility] K50.90    Plantar wart of right foot B07.0    Psoriasis L40.9    Anxiety F41.9     Plan:   · The patient was counseled at length about the risks of efrain Covid-19 in the clay-operative and post-operative states including the recovery window of their procedure. The patient was made aware that efrain Covid-19 after a surgical procedure may worsen their prognosis for recovering from the virus and lend to a higher morbidity and or mortality risk. The patient was given the options of postponing their procedure. All of the risks, benefits, and alternatives were discussed. The patient does wish to proceed with the procedure. · Endoscopic procedure with MAC     Signed By: Tessie Bhatia.  Corinne Ogles, MD     10/2/2020  1:20 PM

## 2020-10-02 NOTE — DISCHARGE INSTRUCTIONS
[address]  [address] [facility], [address] [place] [address]    COLON DISCHARGE INSTRUCTIONS    Kane Clark  [id number]  1995    Discomfort:  Redness at IV site- apply warm compress to area; if redness or soreness persist- contact your physician  There may be a slight amount of blood passed from the rectum  Gaseous discomfort- walking, belching will help relieve any discomfort  You may not operate a vehicle for 12 hours  You may not engage in an occupation involving machinery or appliances for rest of today  You may not drink alcoholic beverages for at least 12 hours  Avoid making any critical decisions for at least 24 hour  DIET:  You may resume your regular diet - however -  remember your colon is empty and a heavy meal will produce gas. Avoid these foods:  vegetables, fried / greasy foods, carbonated drinks     ACTIVITY:  You may  resume your normal daily activities it is recommended that you spend the remainder of the day resting -  avoid any strenuous activity. CALL M.D. ANY SIGN OF:   Increasing pain, nausea, vomiting  Abdominal distension (swelling)  New increased bleeding (oral or rectal)  Fever (chills)  Pain in chest area  Bloody discharge from nose or mouth  Shortness of breath      Follow-up Instructions:   Call Dr. Giuliano Roberson for any questions or problems at [phone]          ENDOSCOPY FINDINGS:   Your colonoscopy showed inflammation/ulceration at your surgical anastomosis. Biopsies were taken. We will contact you about the pathology results and the next step in the plan. Telephone # [phone]      Signed By: Roosevelt Doran.  Adan Arguelles MD     10/2/2020  2:05 PM       DISCHARGE SUMMARY from Nurse    The following personal items collected during your admission are returned to you:   Dental Appliance: Dental Appliances: None  Vision: Visual Aid: None  Hearing Aid:    Jewelry:    Clothing:    Other Valuables:    Valuables sent to safe:      Learning About Coronavirus (COVID-19)  Coronavirus (COVID-19): Overview  What is coronavirus ([id number])? The coronavirus disease (COVID-19) is caused by a virus. It is an illness that was [facility]. It has since spread worldwide. The virus can cause fever, cough, and trouble breathing. In severe cases, it can cause pneumonia and make it hard to breathe without help. It can cause death. Coronaviruses are a large group of viruses. They cause the common cold. They also cause more serious illnesses like Middle East respiratory syndrome (MERS) and severe acute respiratory syndrome (SARS). COVID-19 is caused by a novel coronavirus. That means it's a new type that has not been seen in people before. This virus spreads person-to-person through droplets from coughing and sneezing. It can also spread when you are close to someone who is infected. And it can spread when you touch something that has the virus on it, such as a doorknob or a tabletop. What can you do to protect yourself from coronavirus (COVID-19)? The best way to protect yourself from getting sick is to:  · Avoid areas where there is an outbreak. · Avoid contact with people who may be infected. · Wash your hands often with soap or alcohol-based hand sanitizers. · Avoid crowds and try to stay at least 6 feet away from other people. · Wash your hands often, especially after you cough or sneeze. Use soap and water, and scrub for at least 20 seconds. If soap and water aren't available, use an alcohol-based hand . · Avoid touching your mouth, nose, and eyes. What can you do to avoid spreading the virus to others? To help avoid spreading the virus to others:  · Cover your mouth with a tissue when you cough or sneeze. Then throw the tissue in the trash. · Use a disinfectant to clean things that you touch often. · Stay home if you are sick or have been exposed to the virus. Don't go to school, work, or public areas.  And don't use public transportation. · If you are sick:  ? Leave your home only if you need to get medical care. But call the doctor's office first so they know you're coming. And wear a face mask, if you have one.  ? If you have a face mask, wear it whenever you're around other people. It can help stop the spread of the virus when you cough or sneeze. ? Clean and disinfect your home every day. Use household  and disinfectant wipes or sprays. Take special care to clean things that you grab with your hands. These include doorknobs, remote controls, phones, and handles on your refrigerator and microwave. And don't forget countertops, tabletops, bathrooms, and computer keyboards. When to call for help  Call 911 anytime you think you may need emergency care. For example, call if:  · You have severe trouble breathing. (You can't talk at all.)  · You have constant chest pain or pressure. · You are severely dizzy or lightheaded. · You are confused or can't think clearly. · Your face and lips have a blue color. · You pass out (lose consciousness) or are very hard to wake up. Call your doctor now if you develop symptoms such as:  · Shortness of breath. · Fever. · Cough. If you need to get care, call ahead to the doctor's office for instructions before you go. Make sure you wear a face mask, if you have one, to prevent exposing other people to the virus. Where can you get the latest information? The following health organizations are tracking and studying this virus. Their websites contain the most up-to-date information. Kika Bell also learn what to do if you think you may have been exposed to the virus. · U.S. Centers for Disease Control and Prevention (CDC): The CDC provides updated news about the disease and travel advice. The website also tells you how to prevent the spread of infection. www.cdc.gov  · World Health Organization [facility]): WHO offers information about the virus outbreaks. WHO also has travel advice. www.who.int  Current as of: April 1, 2020               Content Version: 12.4  © 9974-4305 [employer], Incorporated. Care instructions adapted under license by your healthcare professional. If you have questions about a medical condition or this instruction, always ask your healthcare professional. [address] any warranty or liability for your use of this information.

## 2020-10-02 NOTE — ANESTHESIA POSTPROCEDURE EVALUATION
Procedure(s):  COLONOSCOPY   :-  COLON BIOPSY. MAC    Anesthesia Post Evaluation        Patient location during evaluation: PACU  Note status: Adequate. Level of consciousness: responsive to verbal stimuli and sleepy but conscious  Pain management: satisfactory to patient  Airway patency: patent  Anesthetic complications: no  Cardiovascular status: acceptable  Respiratory status: acceptable  Hydration status: acceptable  Comments: +Post-Anesthesia Evaluation and Assessment    Patient: Chantel Peterson MRN: [id number]  SSN: [id number]   YOB: 1995  Age: 27 y.o. Sex: female          Cardiovascular Function/Vital Signs    /72   Pulse 67   Temp 36.8 °C (98.2 °F)   Resp 14   Ht 5' 5\" (1.651 m)   Wt 70.3 kg (155 lb)   SpO2 100%   Breastfeeding No   BMI 25.79 kg/m²     Patient is status post Procedure(s):  COLONOSCOPY   :-  COLON BIOPSY. Nausea/Vomiting: Controlled. Postoperative hydration reviewed and adequate. Pain:  Pain Scale 1: Numeric (0 - 10) (10/02/20 1425)  Pain Intensity 1: 0 (10/02/20 1425)   Managed. Neurological Status: At baseline. Mental Status and Level of Consciousness: Arousable. Pulmonary Status:   O2 Device: Room air (10/02/20 1425)   Adequate oxygenation and airway patent. Complications related to anesthesia: None    Post-anesthesia assessment completed. No concerns. I have evaluated the patient and the patient is stable and ready to be discharged from PACU . Signed By: Carmen Guy MD    10/2/2020        INITIAL Post-op Vital signs:   Vitals Value Taken Time   BP 0/0 10/2/2020  2:33 PM   Temp 36.8 °C (98.2 °F) 10/2/2020  2:06 PM   Pulse 62 10/2/2020  2:33 PM   Resp 16 10/2/2020  2:33 PM   SpO2 100 % 10/2/2020  2:33 PM   Vitals shown include unvalidated device data.

## 2020-10-02 NOTE — PERIOP NOTES

## 2020-10-02 NOTE — ROUTINE PROCESS
Trudy Chao  1995  [id number]    Situation:  Verbal report received from: Bandar Sorensen  Procedure: Procedure(s):  COLONOSCOPY   :-  COLON BIOPSY    Background:    Preoperative diagnosis: CRHON'S  Postoperative diagnosis: crohns    :  Dr. Luciana Young  Assistant(s): Endoscopy Technician-1: Deanna Lott  Endoscopy RN-1: Marya Santiago    Specimens:   ID Type Source Tests Collected by Time Destination   1 : ileo-colonic anastomosis bx Preservative Ileum, Terminal  Tsering Barrientos MD 10/2/2020 1354 Pathology     H. Pylori      Assessment:  Intra-procedure medications       Anesthesia gave intra-procedure sedation and medications, see anesthesia flow sheet yes    Intravenous fluids: NS@ KVO     Vital signs stable yes    Abdominal assessment: round and soft yes    Recommendation:  Discharge patient per MD order .   Return to floor   Family or Friend    Permission to share finding with family or friend no

## 2020-10-02 NOTE — ANESTHESIA PREPROCEDURE EVALUATION
Relevant Problems   No relevant active problems       Anesthetic History   No history of anesthetic complications            Review of Systems / Medical History  Patient summary reviewed, nursing notes reviewed and pertinent labs reviewed    Pulmonary  Within defined limits                 Neuro/Psych   Within defined limits           Cardiovascular  Within defined limits                Exercise tolerance: >4 METS     GI/Hepatic/Renal  Within defined limits              Endo/Other  Within defined limits      Anemia     Other Findings   Comments: crohns disease           Physical Exam    Airway  Mallampati: II  TM Distance: > 6 cm  Neck ROM: normal range of motion   Mouth opening: Normal     Cardiovascular  Regular rate and rhythm,  S1 and S2 normal,  no murmur, click, rub, or gallop             Dental  No notable dental hx       Pulmonary  Breath sounds clear to auscultation               Abdominal  GI exam deferred       Other Findings            Anesthetic Plan    ASA: 2  Anesthesia type: MAC          Induction: Intravenous  Anesthetic plan and risks discussed with: Patient

## 2020-10-02 NOTE — PROCEDURES
[address]  [place], [address] [facility]      Colonoscopy Operative Report    Genet Jc  [id number]  1995      Procedure Type:   Colonoscopy with biopsy     Indications: history of Crohn's disease with prior ileo-colonic resection        Pre-operative Diagnosis: see indication above    Post-operative Diagnosis:  See findings below    :  Terese Verduzco. Gilma Henriquez MD      Referring Provider: Isaac Little MD      Sedation:  MAC anesthesia Propofol      Procedure Details:  After informed consent was obtained with all risks and benefits of procedure explained and preoperative exam completed, the patient was taken to the endoscopy suite and placed in the left lateral decubitus position. Upon sequential sedation as per above, a digital rectal exam was performed demonstrating internal hemorrhoids. The Olympus pediatric videocolonoscope  was inserted in the rectum and carefully advanced to the surgical anastomosis . The quality of preparation was adequate. The colonoscope was slowly withdrawn with careful evaluation between folds. Retroflexion in the rectum was completed . Findings: There was patchy erythema and ulceration at the ileo-colonic anastomosis. Cold biopsies taken. The lloyd-terminal ileum was otherwise normal appearing to length of pediatric colonoscope. The post-operative colon was normal appearing upon withdrawal.      Specimen Removed: 1. Ileo-colonic anastomotic biopsy    Complications: None. EBL:  None. Impression:    As above    Recommendations:  1. Follow up surgical pathology  2. Will await approval from insurance company to restart Humira  3. Avoid NSAID's and tobacco products    Signed By: Teerse Verduzco.  Gilma Henriquez MD     10/2/2020  2:09 PM

## 2020-10-19 ENCOUNTER — OFFICE VISIT (OUTPATIENT)
Dept: PRIMARY CARE CLINIC | Age: 25
End: 2020-10-19
Payer: COMMERCIAL

## 2020-10-19 VITALS
DIASTOLIC BLOOD PRESSURE: 78 MMHG | OXYGEN SATURATION: 100 % | SYSTOLIC BLOOD PRESSURE: 123 MMHG | WEIGHT: 167.8 LBS | TEMPERATURE: 98.6 F | HEIGHT: 65 IN | BODY MASS INDEX: 27.96 KG/M2 | RESPIRATION RATE: 14 BRPM | HEART RATE: 59 BPM

## 2020-10-19 DIAGNOSIS — F41.9 ANXIETY: Primary | ICD-10-CM

## 2020-10-19 DIAGNOSIS — Z01.84 LACK OF IMMUNITY TO HEPATITIS B VIRUS DEMONSTRATED BY SEROLOGIC TEST: ICD-10-CM

## 2020-10-19 DIAGNOSIS — Z78.9 HEPATITIS B VACCINATION STATUS UNKNOWN: ICD-10-CM

## 2020-10-19 DIAGNOSIS — K50.014 CROHN'S DISEASE OF SMALL INTESTINE WITH ABSCESS (HCC): ICD-10-CM

## 2020-10-19 LAB
HBV SURFACE AB SER QL: NONREACTIVE
HBV SURFACE AB SER-ACNC: 6.03 MIU/ML

## 2020-10-19 PROCEDURE — 99214 OFFICE O/P EST MOD 30 MIN: CPT | Performed by: FAMILY MEDICINE

## 2020-10-19 RX ORDER — BUSPIRONE HYDROCHLORIDE 10 MG/1
TABLET ORAL
Qty: 90 TAB | Refills: 1 | Status: SHIPPED | OUTPATIENT
Start: 2020-10-19 | End: 2021-02-02 | Stop reason: SDUPTHER

## 2020-10-19 RX ORDER — ESCITALOPRAM OXALATE 20 MG/1
TABLET ORAL
Qty: 90 TAB | Refills: 1 | Status: SHIPPED | OUTPATIENT
Start: 2020-10-19 | End: 2021-02-02 | Stop reason: SDUPTHER

## 2020-10-19 NOTE — PATIENT INSTRUCTIONS
Office Policies    Phone calls/patient messages:            Please allow up to 24 hours for someone in the office to contact you about your call or message. Be mindful your provider may be out of the office or your message may require further review. We encourage you to use [employer] for your messages as this is a faster, more efficient way to communicate with our office                         Medication Refills:            Prescription medications require 48-72 business hours to process. We encourage you to use [employer] for your refills. For controlled medications: Please allow 72 business hours to process. Certain medications may require you to  a written prescription at our office. NO narcotic/controlled medications will be prescribed after 4pm Monday through Friday or on weekends              Form/Paperwork Completion:            Please note a $25 fee may incur for all paperwork for completed by our providers. We ask that you allow 7-10 business days. Pre-payment is due prior to picking up/faxing the completed form. You may also download your forms to [employer] to have your doctor print off.

## 2020-10-19 NOTE — PROGRESS NOTES
Subjective:     Chief Complaint   Patient presents with    Follow-up     need Hep B vaccine.  Anxiety     med refill     Depression     med refill. She  is a 27y.o. year old female who presents today for follow up on anxiety and depression as well as to get Hep B vaccine. Patient reports that anxiety and depression has been stable. Sometimes has anxiety mostly situational but over all she has been doing well with lexapro and Buspar. Patient will be starting Humira for her Crohn's d/s. Her GI wanted her to get Hep B vaccine if she is not immune. Mild pain in her LLQ with no fever, chills. Pertinent items are noted in HPI.   Objective:     Vitals:    10/19/20 1238   BP: 123/78   Pulse: (!) 59   Resp: 14   Temp: 98.6 °F (37 °C)   TempSrc: Oral   SpO2: 100%   Weight: 167 lb 12.8 oz (76.1 kg)   Height: 5' 5\" (1.651 m)       Physical Examination: General appearance - alert, well appearing, and in no distress, oriented to person, place, and time and overweight  Mental status - alert, oriented to person, place, and time, normal mood, behavior, speech, dress, motor activity, and thought processes  Chest - clear to auscultation, no wheezes, rales or rhonchi, symmetric air entry  Heart - normal rate, regular rhythm, normal S1, S2, no murmurs, rubs, clicks or gallops    No Known Allergies   Social History     Socioeconomic History    Marital status: SINGLE     Spouse name: Not on file    Number of children: Not on file    Years of education: Not on file    Highest education level: Not on file   Tobacco Use    Smoking status: Never Smoker    Smokeless tobacco: Never Used   Substance and Sexual Activity    Alcohol use: No    Drug use: No    Sexual activity: Never   Other Topics Concern      Family History   Problem Relation Age of Onset    Thyroid Disease Mother         hypothyroidism      Past Surgical History:   Procedure Laterality Date    COLONOSCOPY Left 10/2/2020    COLONOSCOPY   :- performed by Danuta Harris MD at [facility] ENDOSCOPY    HX COLONOSCOPY      HX PREMALIG/BENIGN SKIN LESION EXCISION        Past Medical History:   Diagnosis Date    Anemia     [facility]       Current Outpatient Medications   Medication Sig Dispense Refill    mesalamine ER (Apriso) 0.375 gram 24 hour capsule Take 1.5 g by mouth daily. Indications: Crohn's disease      escitalopram oxalate (LEXAPRO) 20 mg tablet TAKE 1 TABLET BY MOUTH DAILY 90 Tab 0    busPIRone (BUSPAR) 10 mg tablet TAKE 1 TABLET BY MOUTH THREE TIMES DAILY 30 Tab 0    triamcinolone acetonide (KENALOG) 0.5 % ointment Apply  to affected area two (2) times a day. As needed. Use thin layer 30 g 3    levonorgestrel (MIRENA) 20 mcg/24 hr (5 years) IUD 1 Device by IntraUTERine route once. Assessment/ Plan:   Diagnoses and all orders for this visit:    1. Anxiety  -     busPIRone (BUSPAR) 10 mg tablet; TAKE 1 TABLET BY MOUTH THREE TIMES DAILY  -     escitalopram oxalate (LEXAPRO) 20 mg tablet; TAKE 1 TABLET BY MOUTH DAILY    2. Hepatitis B vaccination status unknown  -     HEP B SURFACE AB; Future        Non reactive. Hep B vaccine prescription sent to pharmacy. 3. Lack of immunity to hepatitis B virus demonstrated by serologic test  -     hepatitis B virus vaccine, PF, (ENGERIX) 10 mcg/0.5 mL syrg syringe; 0.5 mL by IntraMUSCular route once for 1 dose. Hep b vaccine prescription sent to pharmacy. Need another titre in 6 weeks. If you still show lack of immunity then you will need 2 nd Hep B vaccine. 4. Crohn's disease of small intestine with abscess ([facility]      Closely following up with GI. Medication risks/benefits/costs/interactions/alternatives discussed with patient. Advised patient to call back or return to office if symptoms worsen/change/persist. If patient cannot reach us or should anything more severe/urgent arise he/she should proceed directly to the nearest emergency department.   Discussed expected course/resolution/complications of diagnosis in detail with patient. Patient given a written after visit summary which includes her diagnoses, current medications and vitals. Patient expressed understanding with the diagnosis and plan. Follow-up and Dispositions    · Return in about 6 months (around 4/19/2021), or if symptoms worsen or fail to improve.

## 2020-10-19 NOTE — PROGRESS NOTES
Reviewed record in preparation for visit and have obtained necessary documentation. Identified pt with two pt identifiers(name and ). Chief Complaint   Patient presents with   KASEY Goldy Flores 1 Maintenance Due   Topic Date Due    HPV Age 9Y-34Y (2 - 3-dose series) 2018    DTaP/Tdap/Td series (1 - Tdap) 2018    PAP AKA CERVICAL CYTOLOGY  2018    Flu Vaccine (1) 2020       Ms. Mendoza Speaker has a reminder for a \"due or due soon\" health maintenance. I have asked that she discuss health maintenance topic(s) due with Her  primary care provider. Coordination of Care Questionnaire:  :     1) Have you been to an emergency room, urgent care clinic since your last visit? no   Hospitalized since your last visit? no             2) Have you seen or consulted any other health care providers outside of [address] [place] [address] since your last visit? no  (Include any pap smears or colon screenings in this section.)    3) Do you have an Advance Directive on file? no    4) Are you interested in receiving information on Advance Directives? NO    Patient is accompanied by self I have received verbal consent from Khadijah Pteersen to discuss any/all medical information while they are present in the room. [id number]

## 2020-10-20 ENCOUNTER — TELEPHONE (OUTPATIENT)
Dept: PRIMARY CARE CLINIC | Age: 25
End: 2020-10-20

## 2020-10-20 NOTE — PROGRESS NOTES
Hi,  Result showed that you need hep B vaccine. Hep b vaccine prescription sent to pharmacy. Need another titre in 6 weeks. If you still show lack of immunity then you will need 2 nd Hep B vaccine. Please let me know if there is any question via my chart.     Dr. Johnathan Land

## 2020-10-20 NOTE — TELEPHONE ENCOUNTER
Pharmacy Tech called, for script clarification. Script was sent in for Hep B 10 mcg for peds instead of 20 mcg for adult.     They would like to know which is correct

## 2021-02-02 DIAGNOSIS — F41.9 ANXIETY: ICD-10-CM

## 2021-02-02 RX ORDER — ESCITALOPRAM OXALATE 20 MG/1
TABLET ORAL
Qty: 90 TAB | Refills: 0 | Status: SHIPPED | OUTPATIENT
Start: 2021-02-02 | End: 2021-06-01

## 2021-02-02 RX ORDER — BUSPIRONE HYDROCHLORIDE 10 MG/1
TABLET ORAL
Qty: 90 TAB | Refills: 0 | Status: SHIPPED | OUTPATIENT
Start: 2021-02-02 | End: 2021-06-01

## 2021-06-01 DIAGNOSIS — F41.9 ANXIETY: ICD-10-CM

## 2021-06-01 RX ORDER — BUSPIRONE HYDROCHLORIDE 10 MG/1
TABLET ORAL
Qty: 90 TABLET | Refills: 0 | Status: SHIPPED | OUTPATIENT
Start: 2021-06-01 | End: 2021-10-08 | Stop reason: SDUPTHER

## 2021-06-01 RX ORDER — ESCITALOPRAM OXALATE 20 MG/1
TABLET ORAL
Qty: 90 TABLET | Refills: 0 | Status: SHIPPED | OUTPATIENT
Start: 2021-06-01 | End: 2021-10-08 | Stop reason: SDUPTHER

## 2021-10-08 ENCOUNTER — TRANSCRIBE ORDER (OUTPATIENT)
Dept: INTERNAL MEDICINE CLINIC | Age: 26
End: 2021-10-08

## 2021-10-08 DIAGNOSIS — F41.9 ANXIETY: ICD-10-CM

## 2021-10-13 RX ORDER — ESCITALOPRAM OXALATE 20 MG/1
TABLET ORAL
Qty: 30 TABLET | Refills: 0 | Status: SHIPPED | OUTPATIENT
Start: 2021-10-13

## 2021-10-13 RX ORDER — BUSPIRONE HYDROCHLORIDE 10 MG/1
TABLET ORAL
Qty: 30 TABLET | Refills: 0 | Status: SHIPPED | OUTPATIENT
Start: 2021-10-13 | End: 2022-03-01

## 2022-03-18 PROBLEM — F41.9 ANXIETY: Status: ACTIVE | Noted: 2019-06-12

## 2022-03-19 PROBLEM — L40.9 PSORIASIS: Status: ACTIVE | Noted: 2019-06-12

## 2022-04-06 NOTE — TELEPHONE ENCOUNTER
----- Message from Birdie Alvarez sent at 10/8/2021  2:15 PM EDT -----  Regarding: Dr. Morelos Leader (if not patient): n/a      Relationship of caller (if not patient): n/a      Best contact number(s): [phone]      Name of medication and dosage if known: Natacha Patterson      Is patient out of this medication (yes/no): Yes      Pharmacy name: [id number] listed in chart? (yes/no):  Yes  Pharmacy phone number:      Details to clarify the request:      Birdie Alvarez
Left message to call office to schedule an office visit for medication refill. Patient last seen 10/19/2020.
cardiac precautions/left hip precautions/oxygen therapy device and L/min

## 2022-04-17 ENCOUNTER — HOSPITAL ENCOUNTER (EMERGENCY)
Age: 27
Discharge: HOME | End: 2022-04-17
Attending: EMERGENCY MEDICINE
Payer: COMMERCIAL

## 2022-04-17 VITALS
RESPIRATION RATE: 16 BRPM | SYSTOLIC BLOOD PRESSURE: 132 MMHG | HEART RATE: 85 BPM | TEMPERATURE: 99 F | OXYGEN SATURATION: 99 % | DIASTOLIC BLOOD PRESSURE: 88 MMHG

## 2022-04-17 DIAGNOSIS — S61.112A LACERATION OF LEFT THUMB WITHOUT FOREIGN BODY WITH DAMAGE TO NAIL, INITIAL ENCOUNTER: Primary | ICD-10-CM

## 2022-04-17 PROCEDURE — 74011000250 HC RX REV CODE- 250: Performed by: EMERGENCY MEDICINE

## 2022-04-17 PROCEDURE — 99282 EMERGENCY DEPT VISIT SF MDM: CPT

## 2022-04-17 PROCEDURE — 75810000293 HC SIMP/SUPERF WND  RPR

## 2022-04-17 RX ORDER — MIRTAZAPINE 15 MG/1
15 TABLET, FILM COATED ORAL
COMMUNITY

## 2022-04-17 RX ORDER — LIDOCAINE HYDROCHLORIDE 10 MG/ML
10 INJECTION INFILTRATION; PERINEURAL
Status: COMPLETED | OUTPATIENT
Start: 2022-04-17 | End: 2022-04-17

## 2022-04-17 RX ADMIN — LIDOCAINE HYDROCHLORIDE 10 ML: 10 INJECTION, SOLUTION INFILTRATION; PERINEURAL at 19:33

## 2022-04-17 NOTE — ED PROVIDER NOTES
28-year-old female history of anemia, Crohn's, depression presents to the emergency department with chief complaint of laceration to her left thumb. She was using a knife when she slipped and lacerated her left thumb. No other injuries. The history is provided by the patient and medical records. Laceration   The incident occurred less than 1 hour ago. The laceration is located on the left hand. The laceration is 2 cm in size. The injury mechanism is a clean knife. Foreign body present: no. The pain is mild. The pain has been constant since onset. Pertinent negatives include no weakness. The patient's last tetanus shot was 5 to 10 years ago.        Past Medical History:   Diagnosis Date    Anemia     Crohn's disease ([facility] [address]     Depression        Past Surgical History:   Procedure Laterality Date    COLONOSCOPY Left 10/2/2020    COLONOSCOPY   :- performed by Gisela Jackson MD at [facility] END[facility]COPY    HX COLONOSCOPY      HX PREMALIG/BENIGN SKIN LESION EXCISION           Family History:   Problem Relation Age of Onset    Thyroid Disease Mother         hypothyroidism       Social History     Socioeconomic History    Marital status: SINGLE     Spouse name: Not on file    Number of children: Not on file    Years of education: Not on file    Highest education level: Not on file   Occupational History    Not on file   Tobacco Use    Smoking status: Never Smoker    Smokeless tobacco: Never Used   Vaping Use    Vaping Use: Never used   Substance and Sexual Activity    Alcohol use: No    Drug use: No    Sexual activity: Never   Other Topics Concern     Service Not Asked    Blood Transfusions Not Asked    Caffeine Concern Not Asked    Occupational Exposure Not Asked    Hobby Hazards Not Asked    Sleep Concern Not Asked    Stress Concern Not Asked    Weight Concern Not Asked    Special Diet Not Asked    Back Care Not Asked    Exercise Not Asked    Bike Helmet Not Asked   [address] Not Asked    Self-Exams Not Asked   Social History Narrative    Not on file     Social Determinants of Health     Financial Resource Strain:     Difficulty of Paying Living Expenses: Not on file   Food Insecurity:     Worried About Running Out of Food in the Last Year: Not on file    Yann of Food in the Last Year: Not on file   Transportation Needs:     Lack of Transportation (Medical): Not on file    Lack of Transportation (Non-Medical): Not on file   Physical Activity:     Days of Exercise per Week: Not on file    Minutes of Exercise per Session: Not on file   Stress:     Feeling of Stress : Not on file   Social Connections:     Frequency of Communication with Friends and Family: Not on file    Frequency of Social Gatherings with Friends and Family: Not on file    Attends Anglican Services: Not on file    Active Member of [address] [place] [address] or Organizations: Not on file    Attends Club or Organization Meetings: Not on file    Marital Status: Not on file   Intimate Partner Violence:     Fear of Current or Ex-Partner: Not on file    Emotionally Abused: Not on file    Physically Abused: Not on file    Sexually Abused: Not on file   Housing Stability:     Unable to Pay for Housing in the Last Year: Not on file    Number of [place] in the Last Year: Not on file    Unstable Housing in the Last Year: Not on file         ALLERGIES: Patient has no known allergies. Review of Systems   Constitutional: Negative for fatigue and fever. HENT: Negative for sneezing and sore throat. Respiratory: Negative for cough and shortness of breath. Cardiovascular: Negative for chest pain and leg swelling. Gastrointestinal: Negative for abdominal pain, diarrhea, nausea and vomiting. Genitourinary: Negative for difficulty urinating and dysuria. Musculoskeletal: Negative for arthralgias and myalgias. Skin: Positive for wound. Negative for color change and rash.    Neurological: Negative for weakness and headaches. Psychiatric/Behavioral: Negative for agitation and behavioral problems. Vitals:    04/17/22 1923   BP: 132/88   Pulse: 85   Resp: 16   Temp: 99 °F (37.2 °C)   SpO2: 99%            Physical Exam  Vitals and nursing note reviewed. Constitutional:       General: She is not in acute distress. Appearance: Normal appearance. She is well-developed. She is not ill-appearing, toxic-appearing or diaphoretic. HENT:      Head: Normocephalic and atraumatic. Nose: Nose normal.      Mouth/Throat:      Mouth: Mucous membranes are moist.      Pharynx: Oropharynx is clear. Eyes:      Extraocular Movements: Extraocular movements intact. Conjunctiva/sclera: Conjunctivae normal.      Pupils: Pupils are equal, round, and reactive to light. Cardiovascular:      Rate and Rhythm: Normal rate and regular rhythm. Pulses: Normal pulses. Heart sounds: Normal heart sounds. Pulmonary:      Effort: Pulmonary effort is normal. No respiratory distress. Breath sounds: Normal breath sounds. No wheezing. Chest:      Chest wall: No tenderness. Abdominal:      General: Abdomen is flat. There is no distension. Palpations: Abdomen is soft. Tenderness: There is no abdominal tenderness. There is no guarding or rebound. Musculoskeletal:         General: Signs of injury (Laceration to the distal thumb which includes the medial portion of the nail. The overlying avulsion flap is somewhat dusky but does have cap refill.) present. No swelling, tenderness or deformity. Normal range of motion. Cervical back: Normal range of motion and neck supple. No rigidity. No muscular tenderness. Right lower leg: No edema. Left lower leg: No edema. Skin:     General: Skin is warm and dry. Capillary Refill: Capillary refill takes less than 2 seconds. Neurological:      General: No focal deficit present. Mental Status: She is alert and oriented to person, place, and time. Psychiatric:         Mood and Affect: Mood normal.         Behavior: Behavior normal.          MDM  Number of Diagnoses or Management Options  Laceration of left thumb without foreign body with damage to nail, initial encounter  Diagnosis management comments: 68-year-old female presents as above with laceration to her left thumb. This was repaired in the emergency department. Follow-up with primary care, return if needed. Wound Repair    Date/Time: 4/17/2022 8:17 PM  Performed by: attendingPreparation: skin prepped with Shur-Clens  Pre-procedure re-eval: Immediately prior to the procedure, the patient was reevaluated and found suitable for the planned procedure and any planned medications. Location details: left thumb  Wound length:2.5 cm or less  Anesthesia: digital block    Anesthesia:  Local Anesthetic: lidocaine 1% without epinephrine  Foreign bodies: no foreign bodies  Irrigation solution: saline  Debridement: none  Skin closure: Prolene  Number of sutures: 4  Technique: simple  Approximation: close  Patient tolerance: patient tolerated the procedure well with no immediate complications  My total time at bedside, performing this procedure was 1-15 minutes.

## 2022-04-17 NOTE — ED TRIAGE NOTES
Patient arrives with c/o left hand thumb laceration about 20 minutes ago with kitchen knife. Denies taking anything for pain.

## 2022-04-18 NOTE — DISCHARGE INSTRUCTIONS
Thank you for allowing us to provide you with medical care today. We realize that you have many choices for your emergency care needs. We thank you for choosing [facility]. Please choose us in the future for any continued health care needs. We hope we addressed all of your medical concerns. We strive to provide excellent quality care in the Emergency Department. Anything less than excellent does not meet our expectations. The exam and treatment you received in the Emergency Department were for an emergent problem and are not intended as complete care. It is important that you follow up with a doctor, nurse practitioner, or physician's assistant for ongoing care. If your symptoms worsen or you do not improve as expected and you are unable to reach your usual health care provider, you should return to the Emergency Department. We are available 24 hours a day. Take this sheet with you when you go to your follow-up visit. If you have any problem arranging the follow-up visit, contact the Emergency Department immediately. Make an appointment your family doctor for follow up of this visit. Return to the ER if you are unable to be seen in a timely manner.

## 2022-04-25 ENCOUNTER — HOSPITAL ENCOUNTER (EMERGENCY)
Age: 27
Discharge: HOME | End: 2022-04-25
Attending: EMERGENCY MEDICINE
Payer: COMMERCIAL

## 2022-04-25 VITALS
HEART RATE: 78 BPM | WEIGHT: 189.6 LBS | DIASTOLIC BLOOD PRESSURE: 73 MMHG | BODY MASS INDEX: 31.55 KG/M2 | RESPIRATION RATE: 16 BRPM | SYSTOLIC BLOOD PRESSURE: 129 MMHG | TEMPERATURE: 98 F | OXYGEN SATURATION: 99 %

## 2022-04-25 DIAGNOSIS — Z48.02 ENCOUNTER FOR REMOVAL OF SUTURES: Primary | ICD-10-CM

## 2022-04-25 PROCEDURE — 75810000275 HC EMERGENCY DEPT VISIT NO LEVEL OF CARE

## 2022-04-25 NOTE — ED PROVIDER NOTES
21-year-old female with recent ED evaluation on 4/17 for left thumb laceration, with 4 sutures placed presents for suture removal.  She states that her wound has been healing well with no redness, purulent drainage, fever, or any other medical concerns. She has been keeping the wound clean by washing her hands frequently.            Past Medical History:   Diagnosis Date    Anemia     Crohn's disease ([address]     Depression        Past Surgical History:   Procedure Laterality Date    COLONOSCOPY Left 10/2/2020    COLONOSCOPY   :- performed by [facility] Vallejo [facility] at [facility] ENDOSCOPY    HX COLONOSCOPY      HX PREMALIG/BENIGN SKIN LESION EXCISION           Family History:   Problem Relation Age of Onset    Thyroid Disease Mother         hypothyroidism       Social History     Socioeconomic History    Marital status: SINGLE     Spouse name: Not on file    Number of children: Not on file    Years of education: Not on file    Highest education level: Not on file   Occupational History    Not on file   Tobacco Use    Smoking status: Never Smoker    Smokeless tobacco: Never Used   Vaping Use    Vaping Use: Never used   Substance and Sexual Activity    Alcohol use: No    Drug use: No    Sexual activity: Never   Other Topics Concern     Service Not Asked    Blood Transfusions Not Asked    Caffeine Concern Not Asked    Occupational Exposure Not Asked    Hobby Hazards Not Asked    Sleep Concern Not Asked    Stress Concern Not Asked    Weight Concern Not Asked    Special Diet Not Asked    Back Care Not Asked    Exercise Not Asked    Bike Helmet Not Asked    Seat Belt Not Asked    Self-Exams Not Asked   Social History Narrative    Not on file     Social Determinants of Health     Financial Resource Strain:     Difficulty of Paying Living Expenses: Not on file   Food Insecurity:     Worried About Running Out of Food in the Last Year: Not on file    Yann of Food in the Last Year: Not on file   Transportation Needs:     Lack of Transportation (Medical): Not on file    Lack of Transportation (Non-Medical): Not on file   Physical Activity:     Days of Exercise per Week: Not on file    Minutes of Exercise per Session: Not on file   Stress:     Feeling of Stress : Not on file   Social Connections:     Frequency of Communication with Friends and Family: Not on file    Frequency of Social Gatherings with Friends and Family: Not on file    Attends Sikh Services: Not on file    Active Member of [address] [place] [address] [employer] or Organizations: Not on file    Attends Club or Organization Meetings: Not on file    Marital Status: Not on file   Intimate Partner Violence:     Fear of Current or Ex-Partner: Not on file    Emotionally Abused: Not on file    Physically Abused: Not on file    Sexually Abused: Not on file   Housing Stability:     Unable to Pay for Housing in the Last Year: Not on file    Number of [place] in the Last Year: Not on file    Unstable Housing in the Last Year: Not on file         ALLERGIES: Patient has no known allergies. Review of Systems   Constitutional: Negative for activity change, appetite change, chills and fever. HENT: Negative for congestion, rhinorrhea, sinus pressure, sneezing and sore throat. Eyes: Negative for photophobia and visual disturbance. Respiratory: Negative for cough and shortness of breath. Cardiovascular: Negative for chest pain. Gastrointestinal: Negative for abdominal pain, blood in stool, constipation, diarrhea, nausea and vomiting. Genitourinary: Negative for difficulty urinating, dysuria, flank pain, frequency, hematuria, menstrual problem, urgency, vaginal bleeding and vaginal discharge. Musculoskeletal: Negative for arthralgias, back pain, myalgias and neck pain. Skin: Positive for wound. Negative for rash. Neurological: Negative for syncope, weakness, numbness and headaches.    Psychiatric/Behavioral: Negative for self-injury and suicidal ideas. All other systems reviewed and are negative. Vitals:    04/25/22 1732   BP: 129/73   Pulse: 78   Resp: 16   Temp: 98 °F (36.7 °C)   SpO2: 99%   Weight: 86 kg (189 lb 9.5 oz)            Physical Exam  Vitals and nursing note reviewed. Constitutional:       General: She is not in acute distress. Appearance: Normal appearance. She is well-developed. She is not diaphoretic. HENT:      Head: Normocephalic and atraumatic. Nose: Nose normal.   Eyes:      Extraocular Movements: Extraocular movements intact. Conjunctiva/sclera: Conjunctivae normal.      Pupils: Pupils are equal, round, and reactive to light. Cardiovascular:      Rate and Rhythm: Normal rate and regular rhythm. Heart sounds: Normal heart sounds. Pulmonary:      Effort: Pulmonary effort is normal.      Breath sounds: Normal breath sounds. Abdominal:      General: There is no distension. Palpations: Abdomen is soft. Tenderness: There is no abdominal tenderness. Musculoskeletal:         General: No tenderness. Hands:       Cervical back: Neck supple. Skin:     General: Skin is warm and dry. Neurological:      General: No focal deficit present. Mental Status: She is alert and oriented to person, place, and time. Cranial Nerves: No cranial nerve deficit. Sensory: No sensory deficit. Motor: No weakness. Coordination: Coordination normal.          MDM   4 Sutures removed without issue. Dressed with Band-Aid recommended continued keeping her wound clean. Recommended PCP follow-up as needed and for wound recheck and return precautions were given for worsening or concerns with specific emphasis placed on concern for developing infection.     Procedures

## 2022-04-25 NOTE — ED NOTES
The patient left the Emergency Department ambulatory, alert and oriented and in no acute distress. The patient was encouraged to call or return to the ED for worsening issues or problems.      The patient verbalized understanding of discharge instructions and all questions were answered. The patient has no further concerns at this time.

## 2022-04-25 NOTE — ED TRIAGE NOTES
Triage Note: Patient arrives to ER for suture removal on left thumb. Sutures were placed 04/17/2022.

## 2022-05-17 DIAGNOSIS — F41.9 ANXIETY: ICD-10-CM

## 2022-05-17 NOTE — TELEPHONE ENCOUNTER
PCP: Leyla Merrill MD    Last appt: 10/19/2020  No future appointments. Requested Prescriptions     Pending Prescriptions Disp Refills    busPIRone (BUSPAR) 10 mg tablet 45 Tablet 0     Sig: TAKE 1 TABLET BY MOUTH THREE TIMES A DAY. Must need appointment.        Prior labs and Blood pressures:  BP Readings from Last 3 Encounters:   04/25/22 129/73   04/17/22 132/88   10/19/20 123/78     Lab Results   Component Value Date/Time    Sodium 135 (L) 09/21/2019 03:00 AM    Potassium 3.5 09/21/2019 03:00 AM    Chloride 101 09/21/2019 03:00 AM    CO2 29 09/21/2019 03:00 AM    Anion gap 5 09/21/2019 03:00 AM    Glucose 89 09/21/2019 03:00 AM    BUN 6 09/21/2019 03:00 AM    Creatinine 0.48 (L) 09/21/2019 03:00 AM    BUN/Creatinine ratio 13 09/21/2019 03:00 AM    GFR est AA >60 09/21/2019 03:00 AM    GFR est non-AA >60 09/21/2019 03:00 AM    Calcium 8.6 09/21/2019 03:00 AM

## 2022-05-23 RX ORDER — BUSPIRONE HYDROCHLORIDE 10 MG/1
TABLET ORAL
Qty: 45 TABLET | Refills: 0 | OUTPATIENT
Start: 2022-05-23

## 2022-06-15 DIAGNOSIS — F41.9 ANXIETY: ICD-10-CM

## 2022-06-15 NOTE — TELEPHONE ENCOUNTER
Pt has not been seen since 2020 she will need to schedule a sooner appt with a NP to get medication refills

## 2022-06-15 NOTE — TELEPHONE ENCOUNTER
Patient would like a refill of her Buspirone 10mg along with the Mirtazapine 15mg and Escitalopram Oxalate 20mg to be faxed to [facility] #[id number]. She has an upcoming appt with Dr. Hakeem Montague on 8/29. Patient stated she only has 2 days left of her Buspirone 10mg left.

## 2022-06-20 RX ORDER — BUSPIRONE HYDROCHLORIDE 10 MG/1
TABLET ORAL
Qty: 45 TABLET | Refills: 0 | OUTPATIENT
Start: 2022-06-20

## 2023-05-18 RX ORDER — MIRTAZAPINE 15 MG/1
15 TABLET, FILM COATED ORAL NIGHTLY
Status: ON HOLD | COMMUNITY
End: 2023-06-12

## 2023-05-18 RX ORDER — BUSPIRONE HYDROCHLORIDE 10 MG/1
TABLET ORAL
COMMUNITY
Start: 2022-03-01

## 2023-05-18 RX ORDER — ESCITALOPRAM OXALATE 20 MG/1
TABLET ORAL
COMMUNITY
Start: 2021-10-13

## 2024-04-15 ENCOUNTER — HOSPITAL ENCOUNTER (EMERGENCY)
Facility: HOSPITAL | Age: 29
Discharge: HOME | End: 2024-04-15
Attending: EMERGENCY MEDICINE
Payer: COMMERCIAL

## 2024-04-15 VITALS
HEIGHT: 65 IN | BODY MASS INDEX: 29.99 KG/M2 | HEART RATE: 89 BPM | TEMPERATURE: 98.3 F | WEIGHT: 180 LBS | OXYGEN SATURATION: 98 % | SYSTOLIC BLOOD PRESSURE: 129 MMHG | RESPIRATION RATE: 18 BRPM | DIASTOLIC BLOOD PRESSURE: 93 MMHG

## 2024-04-15 DIAGNOSIS — F41.1 ANXIETY STATE: Primary | ICD-10-CM

## 2024-04-15 LAB
EKG ATRIAL RATE: 87 BPM
EKG DIAGNOSIS: NORMAL
EKG P AXIS: 44 DEGREES
EKG P-R INTERVAL: 128 MS
EKG Q-T INTERVAL: 372 MS
EKG QRS DURATION: 86 MS
EKG QTC CALCULATION (BAZETT): 447 MS
EKG R AXIS: 37 DEGREES
EKG T AXIS: 36 DEGREES
EKG VENTRICULAR RATE: 87 BPM

## 2024-04-15 PROCEDURE — 93005 ELECTROCARDIOGRAM TRACING: CPT | Performed by: EMERGENCY MEDICINE

## 2024-04-15 PROCEDURE — 99283 EMERGENCY DEPT VISIT LOW MDM: CPT

## 2024-04-15 RX ORDER — FERROUS SULFATE 325(65) MG
325 TABLET ORAL
COMMUNITY

## 2024-04-15 ASSESSMENT — ENCOUNTER SYMPTOMS
BACK PAIN: 0
DIARRHEA: 0
SHORTNESS OF BREATH: 0
NAUSEA: 0
ABDOMINAL PAIN: 0
VOMITING: 0
COLOR CHANGE: 0
CONSTIPATION: 0

## 2024-04-15 ASSESSMENT — LIFESTYLE VARIABLES
HOW MANY STANDARD DRINKS CONTAINING ALCOHOL DO YOU HAVE ON A TYPICAL DAY: 5 OR 6
HOW OFTEN DO YOU HAVE A DRINK CONTAINING ALCOHOL: 2-3 TIMES A WEEK

## 2024-04-15 ASSESSMENT — PAIN DESCRIPTION - DESCRIPTORS: DESCRIPTORS: PRESSURE

## 2024-04-15 ASSESSMENT — PAIN DESCRIPTION - FREQUENCY: FREQUENCY: CONTINUOUS

## 2024-04-15 ASSESSMENT — PAIN DESCRIPTION - ORIENTATION: ORIENTATION: ANTERIOR

## 2024-04-15 ASSESSMENT — PAIN DESCRIPTION - LOCATION: LOCATION: CHEST

## 2024-04-15 ASSESSMENT — PAIN DESCRIPTION - PAIN TYPE: TYPE: ACUTE PAIN

## 2024-04-15 ASSESSMENT — PAIN - FUNCTIONAL ASSESSMENT
PAIN_FUNCTIONAL_ASSESSMENT: 0-10
PAIN_FUNCTIONAL_ASSESSMENT: PREVENTS OR INTERFERES WITH MANY ACTIVE NOT PASSIVE ACTIVITIES

## 2024-04-15 ASSESSMENT — PAIN DESCRIPTION - ONSET: ONSET: ON-GOING

## 2024-04-15 ASSESSMENT — PAIN SCALES - GENERAL: PAINLEVEL_OUTOF10: 3

## 2024-04-15 NOTE — ED PROVIDER NOTES
Decision Making  Amount and/or Complexity of Data Reviewed  ECG/medicine tests: ordered.    This is a 28-year-old female with past medical history, review of systems, physical exam as above, presenting with complaints of anxiety, chest pain, \"racing thoughts\".  Patient states approximately 1 week of symptoms, notes that she discontinued heavy alcohol use approximately a week ago.  Patient states a period of years where she would consume 5-6 beverages, 3 days a week.  She endorses a previous history of anxiety and depression, previously treated with medication, currently not taking medication, however speaking with a therapist.  She endorses cocaine use, Adderall use, denies suicidal thoughts, plans, homicidal ideations, auditory visual hallucinations.  She denies previous hospitalization for the same.  Upon arrival she is noted to be mildly hypertensive, afebrile without tachycardia, satting well on room air.  She is awake and alert in no acute distress.  Discussed with patient and mother at bedside medical evaluation for chest pain, as she notes a history of Crohn's disease, and anemia.  Discussed that outside of medical evaluation, ongoing psychiatric care and possibly reintroduction of medication may aid in management of her symptoms.  Patient states she would prefer to defer undergoing diagnostic testing here in the emergency department, and lieu of following up with her therapist.  I have advised her she may return for medical evaluation for symptoms at any point in time.        REASSESSMENT     ED Course as of 04/15/24 1539   Mon Apr 15, 2024   1528 EKG documented and interpreted by Ana Gan MD as: Normal sinus rhythm, HR approximately 87, regular rate, regular intervals, normal axis, no ST segment elevation   [LT]      ED Course User Index  [LT] Ana Gan MD           CONSULTS:  None    PROCEDURES:  Unless otherwise noted below, none     Procedures        FINAL IMPRESSION      1.

## 2024-04-15 NOTE — ED TRIAGE NOTES
Patient states that she stopped drinking alcohol seven days ago. Since last night, she has experienced chest pressure and anxiety. Pt relates that she is not able to eat or sleep and has racing thoughts. Patient denies SI.

## 2024-09-27 NOTE — TELEPHONE ENCOUNTER
We faxed the paper patient brought to office visit for us to fax. Do they need something else? not applicable

## (undated) DEVICE — PACK,BASIC,SIRUS,V: Brand: MEDLINE

## (undated) DEVICE — 1010 S-DRAPE TOWEL DRAPE 10/BX: Brand: STERI-DRAPE™

## (undated) DEVICE — INFECTION CONTROL KIT SYS

## (undated) DEVICE — GAUZE SPONGES,12 PLY: Brand: CURITY

## (undated) DEVICE — HANDLE LT SNAP ON ULT DURABLE LENS FOR TRUMPF ALC DISPOSABLE

## (undated) DEVICE — BASIC PACK: Brand: CONVERTORS

## (undated) DEVICE — DRAPE,EXTREMITY,89X128,STERILE: Brand: MEDLINE

## (undated) DEVICE — (D)BNDG COHESIVE 6X5YD TAN LTX -- DUPE USE ITEM 357348

## (undated) DEVICE — ROCKER SWITCH PENCIL BLADE ELECTRODE, HOLSTER: Brand: EDGE

## (undated) DEVICE — VESSEL SEALER: Brand: ENDOWRIST

## (undated) DEVICE — DRAPE,U/ SHT,SPLIT,PLAS,STERIL: Brand: MEDLINE

## (undated) DEVICE — SUT ETHLN 3-0 18IN PS1 BLK --

## (undated) DEVICE — COVER LT HNDL BLU PLAS

## (undated) DEVICE — GARMENT,MEDLINE,DVT,INT,CALF,MED, GEN2: Brand: MEDLINE

## (undated) DEVICE — STERILE-Z MAYO STAND COVERS CLEAR POLYETHYLENE STERILE UNIVERSAL FIT 20 PER CASE: Brand: STERILE-Z

## (undated) DEVICE — (D)SYR 10ML 1/5ML GRAD NSAF -- PKGING CHANGE USE ITEM 338027

## (undated) DEVICE — COLON CLOSING PACK: Brand: MEDLINE INDUSTRIES, INC.

## (undated) DEVICE — TRI-LUMEN FILTERED TUBE SET WITH ACTIVATED CHARCOAL FILTER: Brand: AIRSEAL

## (undated) DEVICE — DERMACEA GAUZE ROLL: Brand: DERMACEA

## (undated) DEVICE — DRESSING FOAM W10XL10CM ABSRB SFT CNFRM SAFETAC LAYR

## (undated) DEVICE — SUT PROL 3-0 18IN PS2 BLU --

## (undated) DEVICE — ELECTRO LUBE IS A SINGLE PATIENT USE DEVICE THAT IS INTENDED TO BE USED ON ELECTROSURGICAL ELECTRODES TO REDUCE STICKING.: Brand: KEY SURGICAL ELECTRO LUBE

## (undated) DEVICE — LEGGINGS, PAIR, 31X48, STERILE: Brand: MEDLINE

## (undated) DEVICE — SUT SLK 2-0SH 30IN BLK --

## (undated) DEVICE — Device

## (undated) DEVICE — TRAY,URINE METER,100% SILICONE,16FR10ML: Brand: MEDLINE

## (undated) DEVICE — DEVON™ KNEE AND BODY STRAP 60" X 3" (1.5 M X 7.6 CM): Brand: DEVON

## (undated) DEVICE — SEAL UNIV 5-8MM DISP BX/10 -- DA VINCI XI - SNGL USE

## (undated) DEVICE — 3000CC GUARDIAN II: Brand: GUARDIAN

## (undated) DEVICE — AIRSEAL 8 MM ACCESS PORT AND LOW PROFILE OBTURATOR WITH BLADELESS OPTICAL TIP, 120 MM LENGTH: Brand: AIRSEAL

## (undated) DEVICE — SUTURE VCRL SZ 3-0 L27IN ABSRB VLT L26MM SH 1/2 CIR J316H

## (undated) DEVICE — SUTURE PDS II SZ 1 L27IN ABSRB VLT CT-1 L36MM 1/2 CIR Z341H

## (undated) DEVICE — STAPLER 60: Brand: SUREFORM

## (undated) DEVICE — STAPLER RELOAD SUREFORM 60 BLU -- DA VINCI XI

## (undated) DEVICE — NEEDLE HYPO 25GA L5/8IN ORNG HUB S STL LATCH BVL UP

## (undated) DEVICE — SURGICAL PROCEDURE PACK BASIN MAJ SET CUST NO CAUT

## (undated) DEVICE — KERLIX BANDAGE ROLL: Brand: KERLIX

## (undated) DEVICE — (D)PREP SKN CHLRAPRP APPL 26ML -- CONVERT TO ITEM 371833

## (undated) DEVICE — SUTURE MCRYL SZ 4-0 L27IN ABSRB UD L19MM PS-2 1/2 CIR PRIM Y426H

## (undated) DEVICE — DRAPE,REIN 53X77,STERILE: Brand: MEDLINE

## (undated) DEVICE — ACCESS PLATFORM FOR MINIMALLY INVASIVE SURGERY: Brand: GELPOINT®  MINI ADVANCED ACCESS PLATFORM

## (undated) DEVICE — APPLICATOR BNDG 1MM ADH PREMIERPRO EXOFIN

## (undated) DEVICE — STERILE POLYISOPRENE POWDER-FREE SURGICAL GLOVES: Brand: PROTEXIS

## (undated) DEVICE — APPLICATOR FLEXIBLE 360D 40CM --

## (undated) DEVICE — CURITY NON-ADHERENT STRIPS: Brand: CURITY

## (undated) DEVICE — SUTURE VCRL SZ 3-0 L27IN ABSRB UD L26MM SH 1/2 CIR J416H

## (undated) DEVICE — FORCEPS BX L240CM JAW DIA2.8MM L CAP W/ NDL MIC MESH TOOTH

## (undated) DEVICE — NEEDLE HYPO 25GA L1.5IN BVL ORIENTED ECLIPSE

## (undated) DEVICE — VISUALIZATION SYSTEM: Brand: CLEARIFY

## (undated) DEVICE — SURGICAL PROCEDURE KIT GEN LAPAROSCOPY LF

## (undated) DEVICE — SUTURE VCRL SZ 3-0 L27IN ABSRB UD L19MM PS-2 3/8 CIR PRIM J427H

## (undated) DEVICE — DRAPE,ROBOTICS,STERILE: Brand: MEDLINE

## (undated) DEVICE — TOWEL SURG W17XL27IN STD BLU COT NONFENESTRATED PREWASHED

## (undated) DEVICE — SOLUTION IV 1000ML 0.9% SOD CHL

## (undated) DEVICE — BLADELESS OBTURATOR: Brand: WECK VISTA

## (undated) DEVICE — REM POLYHESIVE ADULT PATIENT RETURN ELECTRODE: Brand: VALLEYLAB

## (undated) DEVICE — STERILE POLYISOPRENE POWDER-FREE SURGICAL GLOVES WITH EMOLLIENT COATING: Brand: PROTEXIS

## (undated) DEVICE — SEALANT FIBRIN 10 CC FRZN PRE FILLED SYR TISSEEL

## (undated) DEVICE — PADDING CST 4INX4YD --

## (undated) DEVICE — ARM DRAPE

## (undated) DEVICE — ZIMMER® STERILE DISPOSABLE TOURNIQUET CUFF WITH PROTECTIVE SLEEVE AND PLC, DUAL PORT, SINGLE BLADDER, 18 IN. (46 CM)

## (undated) DEVICE — REDUCER CANN ENDOWRIST 12-8MM -- DA VINCI XI - SNGL USE

## (undated) DEVICE — SUTURE VLOC 90 2/0 VL 6 GS-22 VLOCM2105